# Patient Record
Sex: MALE | Race: ASIAN | Employment: FULL TIME | URBAN - METROPOLITAN AREA
[De-identification: names, ages, dates, MRNs, and addresses within clinical notes are randomized per-mention and may not be internally consistent; named-entity substitution may affect disease eponyms.]

---

## 2021-08-22 ENCOUNTER — APPOINTMENT (OUTPATIENT)
Dept: CT IMAGING | Age: 36
DRG: 064 | End: 2021-08-22
Payer: COMMERCIAL

## 2021-08-22 ENCOUNTER — APPOINTMENT (OUTPATIENT)
Dept: GENERAL RADIOLOGY | Age: 36
DRG: 064 | End: 2021-08-22
Payer: COMMERCIAL

## 2021-08-22 ENCOUNTER — HOSPITAL ENCOUNTER (INPATIENT)
Age: 36
LOS: 4 days | Discharge: HOME | DRG: 064 | End: 2021-08-27
Attending: EMERGENCY MEDICINE | Admitting: INTERNAL MEDICINE
Payer: COMMERCIAL

## 2021-08-22 DIAGNOSIS — R93.0 ABNORMAL HEAD CT: ICD-10-CM

## 2021-08-22 DIAGNOSIS — G45.9 TIA (TRANSIENT ISCHEMIC ATTACK): ICD-10-CM

## 2021-08-22 DIAGNOSIS — I63.429 CEREBROVASCULAR ACCIDENT (CVA) DUE TO EMBOLISM OF ANTERIOR CEREBRAL ARTERY, UNSPECIFIED BLOOD VESSEL LATERALITY (HCC): Primary | ICD-10-CM

## 2021-08-22 DIAGNOSIS — I21.4 NSTEMI (NON-ST ELEVATED MYOCARDIAL INFARCTION) (HCC): ICD-10-CM

## 2021-08-22 PROBLEM — R47.01 APHASIA: Status: ACTIVE | Noted: 2021-08-22

## 2021-08-22 PROBLEM — I63.9 CEREBROVASCULAR ACCIDENT (CVA) DUE TO EMBOLISM (HCC): Status: ACTIVE | Noted: 2021-08-22

## 2021-08-22 LAB
ALBUMIN SERPL-MCNC: 4.5 GM/DL (ref 3.4–5)
ALP BLD-CCNC: 95 IU/L (ref 40–129)
ALT SERPL-CCNC: 30 U/L (ref 10–40)
ANION GAP SERPL CALCULATED.3IONS-SCNC: 10 MMOL/L (ref 4–16)
APTT: 33.7 SECONDS (ref 25.1–37.1)
AST SERPL-CCNC: 30 IU/L (ref 15–37)
BASOPHILS ABSOLUTE: 0.1 K/CU MM
BASOPHILS RELATIVE PERCENT: 0.6 % (ref 0–1)
BILIRUB SERPL-MCNC: 0.4 MG/DL (ref 0–1)
BUN BLDV-MCNC: 12 MG/DL (ref 6–23)
CALCIUM SERPL-MCNC: 9 MG/DL (ref 8.3–10.6)
CHLORIDE BLD-SCNC: 99 MMOL/L (ref 99–110)
CHOLESTEROL: 149 MG/DL
CO2: 25 MMOL/L (ref 21–32)
CREAT SERPL-MCNC: 1.3 MG/DL (ref 0.9–1.3)
DIFFERENTIAL TYPE: ABNORMAL
EOSINOPHILS ABSOLUTE: 0.2 K/CU MM
EOSINOPHILS RELATIVE PERCENT: 1.5 % (ref 0–3)
GFR AFRICAN AMERICAN: >60 ML/MIN/1.73M2
GFR NON-AFRICAN AMERICAN: >60 ML/MIN/1.73M2
GLUCOSE BLD-MCNC: 102 MG/DL (ref 70–99)
GLUCOSE BLD-MCNC: 103 MG/DL (ref 70–99)
HCT VFR BLD CALC: 50.7 % (ref 42–52)
HCT VFR BLD CALC: 52.6 % (ref 42–52)
HDLC SERPL-MCNC: 28 MG/DL
HEMOGLOBIN: 15.9 GM/DL (ref 13.5–18)
HEMOGLOBIN: 16.2 GM/DL (ref 13.5–18)
IMMATURE NEUTROPHIL %: 0.9 % (ref 0–0.43)
INR BLD: 1 INDEX
LDL CHOLESTEROL DIRECT: 94 MG/DL
LYMPHOCYTES ABSOLUTE: 1.8 K/CU MM
LYMPHOCYTES RELATIVE PERCENT: 16.3 % (ref 24–44)
MCH RBC QN AUTO: 26 PG (ref 27–31)
MCH RBC QN AUTO: 26.5 PG (ref 27–31)
MCHC RBC AUTO-ENTMCNC: 30.8 % (ref 32–36)
MCHC RBC AUTO-ENTMCNC: 31.4 % (ref 32–36)
MCV RBC AUTO: 84.4 FL (ref 78–100)
MCV RBC AUTO: 84.6 FL (ref 78–100)
MONOCYTES ABSOLUTE: 0.8 K/CU MM
MONOCYTES RELATIVE PERCENT: 7.5 % (ref 0–4)
NUCLEATED RBC %: 0 %
PDW BLD-RTO: 14.1 % (ref 11.7–14.9)
PDW BLD-RTO: 14.3 % (ref 11.7–14.9)
PLATELET # BLD: 258 K/CU MM (ref 140–440)
PLATELET # BLD: 271 K/CU MM (ref 140–440)
PMV BLD AUTO: 10.3 FL (ref 7.5–11.1)
PMV BLD AUTO: 10.3 FL (ref 7.5–11.1)
POTASSIUM SERPL-SCNC: 4.2 MMOL/L (ref 3.5–5.1)
PROTHROMBIN TIME: 12.9 SECONDS (ref 11.7–14.5)
RBC # BLD: 6.01 M/CU MM (ref 4.6–6.2)
RBC # BLD: 6.22 M/CU MM (ref 4.6–6.2)
SARS-COV-2, NAAT: NOT DETECTED
SEGMENTED NEUTROPHILS ABSOLUTE COUNT: 8.1 K/CU MM
SEGMENTED NEUTROPHILS RELATIVE PERCENT: 73.2 % (ref 36–66)
SODIUM BLD-SCNC: 134 MMOL/L (ref 135–145)
SOURCE: NORMAL
TOTAL IMMATURE NEUTOROPHIL: 0.1 K/CU MM
TOTAL NUCLEATED RBC: 0 K/CU MM
TOTAL PROTEIN: 7 GM/DL (ref 6.4–8.2)
TRIGL SERPL-MCNC: 148 MG/DL
TROPONIN T: 2.3 NG/ML
TROPONIN T: 2.32 NG/ML
WBC # BLD: 11 K/CU MM (ref 4–10.5)
WBC # BLD: 11.1 K/CU MM (ref 4–10.5)

## 2021-08-22 PROCEDURE — 99285 EMERGENCY DEPT VISIT HI MDM: CPT

## 2021-08-22 PROCEDURE — 80053 COMPREHEN METABOLIC PANEL: CPT

## 2021-08-22 PROCEDURE — 85025 COMPLETE CBC W/AUTO DIFF WBC: CPT

## 2021-08-22 PROCEDURE — 80061 LIPID PANEL: CPT

## 2021-08-22 PROCEDURE — 87635 SARS-COV-2 COVID-19 AMP PRB: CPT

## 2021-08-22 PROCEDURE — 6370000000 HC RX 637 (ALT 250 FOR IP): Performed by: EMERGENCY MEDICINE

## 2021-08-22 PROCEDURE — 84484 ASSAY OF TROPONIN QUANT: CPT

## 2021-08-22 PROCEDURE — 93005 ELECTROCARDIOGRAM TRACING: CPT | Performed by: PHYSICIAN ASSISTANT

## 2021-08-22 PROCEDURE — 99254 IP/OBS CNSLTJ NEW/EST MOD 60: CPT | Performed by: INTERNAL MEDICINE

## 2021-08-22 PROCEDURE — 96374 THER/PROPH/DIAG INJ IV PUSH: CPT

## 2021-08-22 PROCEDURE — 83721 ASSAY OF BLOOD LIPOPROTEIN: CPT

## 2021-08-22 PROCEDURE — 6360000004 HC RX CONTRAST MEDICATION: Performed by: PHYSICIAN ASSISTANT

## 2021-08-22 PROCEDURE — 71045 X-RAY EXAM CHEST 1 VIEW: CPT

## 2021-08-22 PROCEDURE — 85730 THROMBOPLASTIN TIME PARTIAL: CPT

## 2021-08-22 PROCEDURE — 82962 GLUCOSE BLOOD TEST: CPT

## 2021-08-22 PROCEDURE — 6360000002 HC RX W HCPCS: Performed by: INTERNAL MEDICINE

## 2021-08-22 PROCEDURE — 85027 COMPLETE CBC AUTOMATED: CPT

## 2021-08-22 PROCEDURE — 70498 CT ANGIOGRAPHY NECK: CPT

## 2021-08-22 PROCEDURE — 70496 CT ANGIOGRAPHY HEAD: CPT

## 2021-08-22 PROCEDURE — 85610 PROTHROMBIN TIME: CPT

## 2021-08-22 PROCEDURE — 93005 ELECTROCARDIOGRAM TRACING: CPT | Performed by: EMERGENCY MEDICINE

## 2021-08-22 PROCEDURE — 70450 CT HEAD/BRAIN W/O DYE: CPT

## 2021-08-22 PROCEDURE — 96376 TX/PRO/DX INJ SAME DRUG ADON: CPT

## 2021-08-22 RX ORDER — ASPIRIN 81 MG/1
324 TABLET, CHEWABLE ORAL ONCE
Status: COMPLETED | OUTPATIENT
Start: 2021-08-22 | End: 2021-08-22

## 2021-08-22 RX ORDER — HEPARIN SODIUM 10000 [USP'U]/100ML
5-30 INJECTION, SOLUTION INTRAVENOUS CONTINUOUS
Status: DISCONTINUED | OUTPATIENT
Start: 2021-08-22 | End: 2021-08-24

## 2021-08-22 RX ORDER — HEPARIN SODIUM 1000 [USP'U]/ML
4000 INJECTION, SOLUTION INTRAVENOUS; SUBCUTANEOUS ONCE
Status: COMPLETED | OUTPATIENT
Start: 2021-08-22 | End: 2021-08-22

## 2021-08-22 RX ORDER — VALACYCLOVIR HYDROCHLORIDE 500 MG/1
500 TABLET, FILM COATED ORAL NIGHTLY
Status: DISCONTINUED | OUTPATIENT
Start: 2021-08-22 | End: 2021-08-27 | Stop reason: HOSPADM

## 2021-08-22 RX ORDER — HEPARIN SODIUM 1000 [USP'U]/ML
60 INJECTION, SOLUTION INTRAVENOUS; SUBCUTANEOUS PRN
Status: DISCONTINUED | OUTPATIENT
Start: 2021-08-22 | End: 2021-08-24

## 2021-08-22 RX ORDER — HEPARIN SODIUM 1000 [USP'U]/ML
30 INJECTION, SOLUTION INTRAVENOUS; SUBCUTANEOUS PRN
Status: DISCONTINUED | OUTPATIENT
Start: 2021-08-22 | End: 2021-08-24

## 2021-08-22 RX ADMIN — HEPARIN SODIUM AND DEXTROSE 11.7 UNITS/KG/HR: 10000; 5 INJECTION INTRAVENOUS at 21:45

## 2021-08-22 RX ADMIN — IOPAMIDOL 80 ML: 755 INJECTION, SOLUTION INTRAVENOUS at 17:41

## 2021-08-22 RX ADMIN — ASPIRIN 324 MG: 81 TABLET, CHEWABLE ORAL at 17:56

## 2021-08-22 RX ADMIN — HEPARIN SODIUM 4000 UNITS: 1000 INJECTION INTRAVENOUS; SUBCUTANEOUS at 21:51

## 2021-08-22 NOTE — ED PROVIDER NOTES
HISTORY OF PRESENT ILLNESS  Kennedy Mary is a 30 y.o. male history of tobacco abuse that presents with a chief complaint of left jaw/throat numbness, difficulty swallowing, expressive aphasia. Pt stated it started at 10 am today. He state he had difficulty finding the appropriate words and making his mouth make the words. He also stated that two days ago he had chest pressure he took an acid reducing medication for with improvement and another episode earlier today. He states he thought he was developing a cold. PHYSICAL EXAM  BP (!) 146/94   Pulse 110   Temp 99.1 °F (37.3 °C) (Oral)   Resp 16   Ht 5' 10\" (1.778 m)   Wt 189 lb (85.7 kg)   SpO2 98%   BMI 27.12 kg/m²     On exam, the patient appears in no acute distress. Speech is clear. Breathing is unlabored. Heart tachycardic and regular. Cranial nerves II through XII intact. Motor strength 5/5 in upper and lower extremities. Patient able to finger-to-nose and heel-to-shin. NIH 0. Stroke alert was called by my HERMAN. CT head demonstrated no acute signs of stroke but chronic calcifications. Patient was assessed by stroke neurologist Dr. Skip Lutz. CTA head/neck is pending. As long as that doesn't show acute abnormalities, he recommended admission for MRI and chest pain work-up. EKG: Sinus tachycardia with flipped T's in the lateral leads. Ventricular rate 103 bpm.  Abnormal EKG    1854: Consult place to interventional. Repeat EKG normal sinus rhythm with inverted T waves in lead lateral leads with new inverted T waves in V2, V3.  1857: Discussed patient with Dr. Brittany Alcantara. He'll see the patient. Patient was evaluated by Dr. Brittany Alcantara. He placed patient on heparin drip and ordered MRI. He states he'll take patient to cath lab tomorrow. Concerned for NSTEMI and possible ACS. Third EKG Normal sinus rhythm with no significant changes from the previous EKG. Total critical care time provided today was 15 minutes.  This excludes seperately billable procedures and family discussion time. Critical care time provided for obtaining history, conducting a physical exam, performing and monitoring interventions, ordering, collecting and interpreting tests, and establishing medical decision-making. There was a potential for life/limb threatening pathology requiring close evaluation and intervention with concern for patient decompensation. All diagnostic, treatment, and disposition decisions were made by myself in conjunction with the advanced practice provider. For all further details of the patient's emergency department visit, please see the advanced practice provider's documentation. Comment: Please note this report has been produced using speech recognition software and may contain errors related to that system including errors in grammar, punctuation, and spelling, as well as words and phrases that may be inappropriate. If there are any questions or concerns please feel free to contact the dictating provider for clarification.         Cordelia Baldwin DO  08/22/21 2119

## 2021-08-22 NOTE — LETTER
Laurie Alvares was seen and treated in our hospital from  8/22/2021 to 8/27/2021 . He will be off work until after his office Visit with Cardiology on 8/31/2021. If you have any questions or concerns, please don't hesitate to call.          Electronically signed by RICARDO Garcia CNP on 8/27/2021 at 10:29 AM no

## 2021-08-22 NOTE — CONSULTS
CARDIOLOGY CONSULT NOTE   Reason for consultation:  NSTEMI         Dear  Dr. Coolidge Nageotte  Thanks for the consult. Chief Complaints :  Chief Complaint   Patient presents with    Numbness     Jaw since 10 am        History of present illness:Shane is a 30 y. o.year old who presents with complaining of inability to express himself jaw tightness lasting few hours today he was having difficulty expressing himself and could not come out with words initially stroke call was called but CT head did not show any acute stroke although was concerning for possible chronic changes and cysticercosis. He says 2 days back he had severe chest pain pressure in the middle of the chest he thought it was heartburn took some honey and water which helped and symptoms resolved he is currently not having any chest pain. He reports jaw tightness or difficulty articulating words. Previous history includes retinal detachment is not on any medication no family history of early coronary artery disease does have history of tobacco use does not drink any alcohol denies any drug use. EKG shows T wave inversion across lateral leads troponin is positive for 2.3 concerning for NSTEMI    Past medical history:    has no past medical history on file. Past surgical history:   has no past surgical history on file. Social History:   reports that he has been smoking. He started smoking today. He has been smoking about 0.50 packs per day. He does not have any smokeless tobacco history on file. He reports that he does not use drugs.   Family history:   no family history of CAD, STROKE of DM at early age    No Known Allergies    heparin (porcine) injection 4,000 Units, Once  heparin (porcine) injection 5,140 Units, PRN  heparin (porcine) injection 2,570 Units, PRN  heparin 25,000 units in dextrose 5% 250 mL (premix) infusion, Continuous      Current Facility-Administered Medications   Medication Dose Route Frequency Provider Last Rate Last Admin    heparin (porcine) injection 4,000 Units  4,000 Units Intravenous Once Karan Michael MD        heparin (porcine) injection 5,140 Units  60 Units/kg Intravenous PRN Karan Michael MD        heparin (porcine) injection 2,570 Units  30 Units/kg Intravenous PRN Karan Michael MD        heparin 25,000 units in dextrose 5% 250 mL (premix) infusion  5-30 Units/kg/hr Intravenous Continuous Karan Michael MD         No current outpatient medications on file. Review of Systems:   · Constitutional: No Fever or Weight Loss   · Eyes: No Decreased Vision  · ENT: No Headaches, Hearing Loss or Vertigo  · Cardiovascular: As per HPI  · Respiratory: As per HPI  · Gastrointestinal: No abdominal pain, appetite loss, blood in stools, constipation, diarrhea or heartburn  · Genitourinary: No dysuria, trouble voiding, or hematuria  · Musculoskeletal:  No gait disturbance, weakness or joint complaints  · Integumentary: No rash or pruritis  · Neurological: No TIA or stroke symptoms  · Psychiatric: No anxiety or depression  · Endocrine: No malaise, fatigue or temperature intolerance  · Hematologic/Lymphatic: No bleeding problems, blood clots or swollen lymph nodes  · Allergic/Immunologic: No nasal congestion or hives  All systems negative except as marked. Physical Examination:    Vitals:    08/22/21 1700 08/22/21 1730 08/22/21 1800 08/22/21 1830   BP: (!) 133/90 129/86 122/88 123/84   Pulse: 108 108 88 85   Resp: 17 17 18 25   Temp:       TempSrc:       SpO2: 99% 100% 100% 100%   Weight:       Height:           General Appearance:  No distress, conversant    Constitutional:  Well developed, Well nourished, No acute distress, Non-toxic appearance.    HENT:  Normocephalic, Atraumatic, Bilateral external ears normal, Oropharynx moist, No oral exudates, Nose normal. Neck- Normal range of motion, No tenderness, Supple, No stridor,no apical-carotid delay  Lymphatics : no palpable lymph nodes  Eyes:  PERRL, EOMI, Conjunctiva normal, No discharge. Respiratory:  Normal breath sounds, No respiratory distress, No wheezing, No chest tenderness. ,no use of accessory muscles, crackles Absent   Cardiovascular: (PMI) apex non displaced,no lifts no thrills, ankle swelling Absent  , 1+, s1 and s2 audible,Murmur. Absent , JVD not noted    Abdomen /GI:  Bowel sounds normal, Soft, No tenderness, No masses, No gross visceromegaly   :  No costovertebral angle tenderness   Musculoskeletal:  No edema, no tenderness, no deformities. Back- no tenderness  Integument:  Well hydrated, no rash   Lymphatic:  No lymphadenopathy noted   Neurologic:  Alert & oriented x 3, CN 2-12 normal, normal motor function, normal sensory function, no focal deficits noted           Medical decision making and Data review:    Lab Review   Recent Labs     08/22/21 1756   WBC 11.1*   HGB 15.9   HCT 50.7         Recent Labs     08/22/21 1756   *   K 4.2   CL 99   CO2 25   BUN 12   CREATININE 1.3     Recent Labs     08/22/21 1756   AST 30   ALT 30   BILITOT 0.4   ALKPHOS 95     Recent Labs     08/22/21 1756   TROPONINT 2.300*       No results for input(s): PROBNP in the last 72 hours. Lab Results   Component Value Date    INR 1.00 08/22/2021    PROTIME 12.9 08/22/2021       EKG: (reviewed by myself)    ECHO:(reviewed by myself)    Chest Xray:(reviewed by myself)  CT HEAD WO CONTRAST    Result Date: 8/22/2021  EXAMINATION: CT OF THE HEAD WITHOUT CONTRAST  8/22/2021 5:19 pm TECHNIQUE: CT of the head was performed without the administration of intravenous contrast. Dose modulation, iterative reconstruction, and/or weight based adjustment of the mA/kV was utilized to reduce the radiation dose to as low as reasonably achievable. COMPARISON: None. HISTORY: ORDERING SYSTEM PROVIDED HISTORY: dysphasia TECHNOLOGIST PROVIDED HISTORY: Has a \"code stroke\" or \"stroke alert\" been called? ->Yes Reason for exam:->dysphasia Decision Support Exception - unselect if not a suspected or confirmed emergency medical condition->Emergency Medical Condition (MA) Reason for Exam: dysphasia Acuity: Acute Type of Exam: Initial FINDINGS: BRAIN/VENTRICLES: There is no acute intracranial hemorrhage, mass effect or midline shift. No abnormal extra-axial fluid collection. The gray-white differentiation is maintained without evidence of an acute infarct. There is no evidence of hydrocephalus. There are subtle areas of coarse intraparenchymal calcification within the left superior frontal gyrus subcortical white matter and right inferior frontal gyrus subcortical white matter ORBITS: The lens within the right globe is not visualized and there is hyperdensity filling the vitreous of the right globe. The visualized portion of the orbits demonstrate no acute abnormality. SINUSES: Mild mucosal thickening of the ethmoidal air cells. The visualized paranasal sinuses and mastoid air cells demonstrate no acute abnormality. SOFT TISSUES/SKULL:  No acute abnormality of the visualized skull or soft tissues. No acute intracranial abnormality. No acute territorial infarction, intracranial hemorrhage or mass lesion. There are subtle areas of coarse intraparenchymal calcification within the left superior frontal gyrus subcortical white matter and right inferior frontal gyrus subcortical white matter. Findings are nonspecific but may be suggestive chronic calcified stage is neurocysticercosis in correct clinical setting. The lens within the right globe is not visualized and there is hyperdensity filling the vitreous of the right globe. The finding is likely related to prior surgical intervention for retinal detachment. Clinical correlation is recommended. Critical results were called by Dr. Denisse Perry MD to Dr. Dana An on 8/22/2021 at 17:31. XR CHEST PORTABLE    Result Date: 8/22/2021  EXAMINATION: ONE XRAY VIEW OF THE CHEST 8/22/2021 6:02 pm COMPARISON: None.  HISTORY: ORDERING SYSTEM PROVIDED HISTORY: dysphasia TECHNOLOGIST PROVIDED HISTORY: Reason for exam:->dysphasia Reason for Exam: stroke protocol, dysphasia, numbness Acuity: Acute Type of Exam: Initial FINDINGS: The lungs are without acute focal process. There is no effusion or pneumothorax. The cardiomediastinal silhouette is without acute process. The osseous structures are without acute process. No acute process. CTA NECK W CONTRAST    Result Date: 8/22/2021  EXAMINATION: CTA OF THE NECK; CTA OF THE HEAD WITH CONTRAST 8/22/2021 5:43 pm; 8/22/2021 5:40 pm: TECHNIQUE: CTA of the neck was performed with the administration of intravenous contrast. Multiplanar reformatted images are provided for review. MIP images are provided for review. Stenosis of the internal carotid arteries measured using NASCET criteria. Dose modulation, iterative reconstruction, and/or weight based adjustment of the mA/kV was utilized to reduce the radiation dose to as low as reasonably achievable.; CTA of the head/brain was performed with the administration of intravenous contrast. Multiplanar reformatted images are provided for review. MIP images are provided for review. Dose modulation, iterative reconstruction, and/or weight based adjustment of the mA/kV was utilized to reduce the radiation dose to as low as reasonably achievable. COMPARISON: None. HISTORY: ORDERING SYSTEM PROVIDED HISTORY: dysphasia TECHNOLOGIST PROVIDED HISTORY: Has a \"code stroke\" or \"stroke alert\" been called? ->Yes Reason for exam:->dysphasia Decision Support Exception - unselect if not a suspected or confirmed emergency medical condition->Emergency Medical Condition (MA) Reason for Exam: dysphasia Acuity: Acute Type of Exam: Initial FINDINGS: CTA NECK: AORTIC ARCH/ARCH VESSELS: No dissection or arterial injury. No significant stenosis of the brachiocephalic or subclavian arteries. CAROTID ARTERIES: No dissection, arterial injury, or hemodynamically significant stenosis by NASCET criteria. VERTEBRAL ARTERIES: No dissection, arterial injury, or significant stenosis. SOFT TISSUES: The lung apices are clear. No cervical or superior mediastinal lymphadenopathy. The larynx and pharynx are unremarkable. No acute abnormality of the salivary and thyroid glands. BONES: No acute osseous abnormality. CTA HEAD: ANTERIOR CIRCULATION: No significant stenosis of the intracranial internal carotid, anterior cerebral, or middle cerebral arteries. No aneurysm. Right SHUN A1 segment is hypoplastic. POSTERIOR CIRCULATION: No significant stenosis of the vertebral, basilar, or posterior cerebral arteries. No aneurysm. OTHER: No dural venous sinus thrombosis on this non-dedicated study. BRAIN: No mass effect or midline shift. No extra-axial fluid collection. The gray-white differentiation is maintained. Unremarkable CTA of the head and neck. CTA HEAD W CONTRAST    Result Date: 8/22/2021  EXAMINATION: CTA OF THE NECK; CTA OF THE HEAD WITH CONTRAST 8/22/2021 5:43 pm; 8/22/2021 5:40 pm: TECHNIQUE: CTA of the neck was performed with the administration of intravenous contrast. Multiplanar reformatted images are provided for review. MIP images are provided for review. Stenosis of the internal carotid arteries measured using NASCET criteria. Dose modulation, iterative reconstruction, and/or weight based adjustment of the mA/kV was utilized to reduce the radiation dose to as low as reasonably achievable.; CTA of the head/brain was performed with the administration of intravenous contrast. Multiplanar reformatted images are provided for review. MIP images are provided for review. Dose modulation, iterative reconstruction, and/or weight based adjustment of the mA/kV was utilized to reduce the radiation dose to as low as reasonably achievable. COMPARISON: None. HISTORY: ORDERING SYSTEM PROVIDED HISTORY: dysphasia TECHNOLOGIST PROVIDED HISTORY: Has a \"code stroke\" or \"stroke alert\" been called? ->Yes Reason for exam:->dysphasia Decision Support Exception - unselect if not a suspected or confirmed emergency medical condition->Emergency Medical Condition (MA) Reason for Exam: dysphasia Acuity: Acute Type of Exam: Initial FINDINGS: CTA NECK: AORTIC ARCH/ARCH VESSELS: No dissection or arterial injury. No significant stenosis of the brachiocephalic or subclavian arteries. CAROTID ARTERIES: No dissection, arterial injury, or hemodynamically significant stenosis by NASCET criteria. VERTEBRAL ARTERIES: No dissection, arterial injury, or significant stenosis. SOFT TISSUES: The lung apices are clear. No cervical or superior mediastinal lymphadenopathy. The larynx and pharynx are unremarkable. No acute abnormality of the salivary and thyroid glands. BONES: No acute osseous abnormality. CTA HEAD: ANTERIOR CIRCULATION: No significant stenosis of the intracranial internal carotid, anterior cerebral, or middle cerebral arteries. No aneurysm. Right SHUN A1 segment is hypoplastic. POSTERIOR CIRCULATION: No significant stenosis of the vertebral, basilar, or posterior cerebral arteries. No aneurysm. OTHER: No dural venous sinus thrombosis on this non-dedicated study. BRAIN: No mass effect or midline shift. No extra-axial fluid collection. The gray-white differentiation is maintained. Unremarkable CTA of the head and neck. All labs, medications and tests reviewed by myself including data  from outside source , patient and available family . Continue all other medications of all above medical condition listed as is. Impression:  Active Problems:    NSTEMI (non-ST elevated myocardial infarction) [facility])    Cerebrovascular accident (CVA) due to embolism [facility])    Aphasia  Resolved Problems:    * No resolved hospital problems. *      Assessment: 30 y. o.year old with PMH of  has no past medical history on file.       Plan and Recommendations:    NSTEMI: Start heparin use nitro if he has any chest pain will plan cardiac catheterization keep n.p.o. after midnight start aspirin. CVA/TIA: Unable to assess whether he had expressive aphasia or had difficulty articulating words because of jaw pain he says he did not have pain? Multiple embolic events get echo get MRI brain CT head and CTA neck reviewed no evidence of acute thrombus  Smoking cessation  Check lipid panel  6. Dyslipidemia: Start statins          Thank you  much for consult and giving us the opportunity in contributing in the care of this patient. Please feel free to call me for any questions.        Lewis Blake MD, 8/22/2021 7:35 PM

## 2021-08-23 ENCOUNTER — APPOINTMENT (OUTPATIENT)
Dept: MRI IMAGING | Age: 36
DRG: 064 | End: 2021-08-23
Payer: COMMERCIAL

## 2021-08-23 LAB
ACTIVATED CLOTTING TIME, LOW RANGE: 167 SEC
ACTIVATED CLOTTING TIME, LOW RANGE: 240 SEC
APTT: 34.1 SECONDS (ref 25.1–37.1)
APTT: 39.1 SECONDS (ref 25.1–37.1)
CHP ED QC CHECK: YES
EKG ATRIAL RATE: 103 BPM
EKG ATRIAL RATE: 74 BPM
EKG ATRIAL RATE: 79 BPM
EKG ATRIAL RATE: 96 BPM
EKG DIAGNOSIS: NORMAL
EKG P AXIS: 44 DEGREES
EKG P AXIS: 49 DEGREES
EKG P AXIS: 52 DEGREES
EKG P AXIS: 71 DEGREES
EKG P-R INTERVAL: 142 MS
EKG P-R INTERVAL: 146 MS
EKG P-R INTERVAL: 150 MS
EKG P-R INTERVAL: 152 MS
EKG Q-T INTERVAL: 320 MS
EKG Q-T INTERVAL: 348 MS
EKG Q-T INTERVAL: 368 MS
EKG Q-T INTERVAL: 394 MS
EKG QRS DURATION: 82 MS
EKG QRS DURATION: 84 MS
EKG QRS DURATION: 84 MS
EKG QRS DURATION: 86 MS
EKG QTC CALCULATION (BAZETT): 419 MS
EKG QTC CALCULATION (BAZETT): 421 MS
EKG QTC CALCULATION (BAZETT): 437 MS
EKG QTC CALCULATION (BAZETT): 439 MS
EKG R AXIS: 20 DEGREES
EKG R AXIS: 21 DEGREES
EKG R AXIS: 27 DEGREES
EKG R AXIS: 47 DEGREES
EKG T AXIS: 144 DEGREES
EKG T AXIS: 156 DEGREES
EKG T AXIS: 158 DEGREES
EKG T AXIS: 161 DEGREES
EKG VENTRICULAR RATE: 103 BPM
EKG VENTRICULAR RATE: 74 BPM
EKG VENTRICULAR RATE: 79 BPM
EKG VENTRICULAR RATE: 96 BPM
GLUCOSE BLD-MCNC: 83 MG/DL
GLUCOSE BLD-MCNC: 83 MG/DL (ref 70–99)
LV EF: 20 %
LVEF MODALITY: NORMAL

## 2021-08-23 PROCEDURE — C1887 CATHETER, GUIDING: HCPCS

## 2021-08-23 PROCEDURE — 4A023N7 MEASUREMENT OF CARDIAC SAMPLING AND PRESSURE, LEFT HEART, PERCUTANEOUS APPROACH: ICD-10-PCS | Performed by: INTERNAL MEDICINE

## 2021-08-23 PROCEDURE — C1769 GUIDE WIRE: HCPCS

## 2021-08-23 PROCEDURE — 6360000004 HC RX CONTRAST MEDICATION

## 2021-08-23 PROCEDURE — 2709999900 HC NON-CHARGEABLE SUPPLY

## 2021-08-23 PROCEDURE — 2500000003 HC RX 250 WO HCPCS

## 2021-08-23 PROCEDURE — 2580000003 HC RX 258

## 2021-08-23 PROCEDURE — 2000000000 HC ICU R&B

## 2021-08-23 PROCEDURE — 93308 TTE F-UP OR LMTD: CPT

## 2021-08-23 PROCEDURE — 85347 COAGULATION TIME ACTIVATED: CPT

## 2021-08-23 PROCEDURE — C1894 INTRO/SHEATH, NON-LASER: HCPCS

## 2021-08-23 PROCEDURE — 85730 THROMBOPLASTIN TIME PARTIAL: CPT

## 2021-08-23 PROCEDURE — 6370000000 HC RX 637 (ALT 250 FOR IP): Performed by: INTERNAL MEDICINE

## 2021-08-23 PROCEDURE — 82962 GLUCOSE BLOOD TEST: CPT

## 2021-08-23 PROCEDURE — 93458 L HRT ARTERY/VENTRICLE ANGIO: CPT | Performed by: INTERNAL MEDICINE

## 2021-08-23 PROCEDURE — 93005 ELECTROCARDIOGRAM TRACING: CPT | Performed by: INTERNAL MEDICINE

## 2021-08-23 PROCEDURE — 93306 TTE W/DOPPLER COMPLETE: CPT

## 2021-08-23 PROCEDURE — 6360000002 HC RX W HCPCS

## 2021-08-23 PROCEDURE — 85610 PROTHROMBIN TIME: CPT

## 2021-08-23 PROCEDURE — 70551 MRI BRAIN STEM W/O DYE: CPT

## 2021-08-23 PROCEDURE — 99233 SBSQ HOSP IP/OBS HIGH 50: CPT | Performed by: INTERNAL MEDICINE

## 2021-08-23 PROCEDURE — 93458 L HRT ARTERY/VENTRICLE ANGIO: CPT

## 2021-08-23 PROCEDURE — 36415 COLL VENOUS BLD VENIPUNCTURE: CPT

## 2021-08-23 PROCEDURE — 93010 ELECTROCARDIOGRAM REPORT: CPT | Performed by: INTERNAL MEDICINE

## 2021-08-23 PROCEDURE — 6360000002 HC RX W HCPCS: Performed by: INTERNAL MEDICINE

## 2021-08-23 RX ORDER — POLYETHYLENE GLYCOL 3350 17 G/17G
17 POWDER, FOR SOLUTION ORAL DAILY PRN
Status: DISCONTINUED | OUTPATIENT
Start: 2021-08-23 | End: 2021-08-27 | Stop reason: HOSPADM

## 2021-08-23 RX ORDER — WARFARIN SODIUM 2.5 MG/1
2.5 TABLET ORAL DAILY
Status: DISCONTINUED | OUTPATIENT
Start: 2021-08-23 | End: 2021-08-24

## 2021-08-23 RX ORDER — ONDANSETRON 2 MG/ML
4 INJECTION INTRAMUSCULAR; INTRAVENOUS EVERY 6 HOURS PRN
Status: DISCONTINUED | OUTPATIENT
Start: 2021-08-23 | End: 2021-08-27 | Stop reason: HOSPADM

## 2021-08-23 RX ORDER — ATORVASTATIN CALCIUM 20 MG/1
80 TABLET, FILM COATED ORAL NIGHTLY
Status: DISCONTINUED | OUTPATIENT
Start: 2021-08-23 | End: 2021-08-27 | Stop reason: HOSPADM

## 2021-08-23 RX ORDER — ASPIRIN 81 MG/1
81 TABLET, CHEWABLE ORAL DAILY
Status: DISCONTINUED | OUTPATIENT
Start: 2021-08-23 | End: 2021-08-27 | Stop reason: HOSPADM

## 2021-08-23 RX ORDER — WARFARIN SODIUM 5 MG/1
5 TABLET ORAL DAILY
Status: DISCONTINUED | OUTPATIENT
Start: 2021-08-23 | End: 2021-08-23

## 2021-08-23 RX ORDER — ACETAMINOPHEN 650 MG/1
650 SUPPOSITORY RECTAL EVERY 6 HOURS PRN
Status: DISCONTINUED | OUTPATIENT
Start: 2021-08-23 | End: 2021-08-27 | Stop reason: HOSPADM

## 2021-08-23 RX ORDER — SODIUM CHLORIDE 0.9 % (FLUSH) 0.9 %
5-40 SYRINGE (ML) INJECTION PRN
Status: DISCONTINUED | OUTPATIENT
Start: 2021-08-23 | End: 2021-08-27 | Stop reason: HOSPADM

## 2021-08-23 RX ORDER — ATROPINE SULFATE 0.1 MG/ML
INJECTION INTRAVENOUS
Status: DISPENSED
Start: 2021-08-23 | End: 2021-08-24

## 2021-08-23 RX ORDER — SODIUM CHLORIDE 0.9 % (FLUSH) 0.9 %
5-40 SYRINGE (ML) INJECTION EVERY 12 HOURS SCHEDULED
Status: DISCONTINUED | OUTPATIENT
Start: 2021-08-23 | End: 2021-08-27 | Stop reason: HOSPADM

## 2021-08-23 RX ORDER — SODIUM CHLORIDE 9 MG/ML
INJECTION, SOLUTION INTRAVENOUS CONTINUOUS
Status: DISCONTINUED | OUTPATIENT
Start: 2021-08-23 | End: 2021-08-26

## 2021-08-23 RX ORDER — SODIUM CHLORIDE 9 MG/ML
25 INJECTION, SOLUTION INTRAVENOUS PRN
Status: DISCONTINUED | OUTPATIENT
Start: 2021-08-23 | End: 2021-08-27 | Stop reason: HOSPADM

## 2021-08-23 RX ORDER — ONDANSETRON 4 MG/1
4 TABLET, ORALLY DISINTEGRATING ORAL EVERY 8 HOURS PRN
Status: DISCONTINUED | OUTPATIENT
Start: 2021-08-23 | End: 2021-08-27 | Stop reason: HOSPADM

## 2021-08-23 RX ORDER — ACETAMINOPHEN 325 MG/1
650 TABLET ORAL EVERY 6 HOURS PRN
Status: DISCONTINUED | OUTPATIENT
Start: 2021-08-23 | End: 2021-08-27 | Stop reason: HOSPADM

## 2021-08-23 RX ORDER — METOPROLOL SUCCINATE 25 MG/1
25 TABLET, EXTENDED RELEASE ORAL DAILY
Status: DISCONTINUED | OUTPATIENT
Start: 2021-08-23 | End: 2021-08-27 | Stop reason: HOSPADM

## 2021-08-23 RX ORDER — LISINOPRIL 5 MG/1
5 TABLET ORAL DAILY
Status: DISCONTINUED | OUTPATIENT
Start: 2021-08-23 | End: 2021-08-27 | Stop reason: HOSPADM

## 2021-08-23 RX ADMIN — HEPARIN SODIUM 5140 UNITS: 1000 INJECTION INTRAVENOUS; SUBCUTANEOUS at 13:38

## 2021-08-23 RX ADMIN — METOPROLOL SUCCINATE 25 MG: 25 TABLET, EXTENDED RELEASE ORAL at 12:15

## 2021-08-23 RX ADMIN — VALACYCLOVIR HYDROCHLORIDE 500 MG: 500 TABLET, FILM COATED ORAL at 00:00

## 2021-08-23 RX ADMIN — VALACYCLOVIR HYDROCHLORIDE 500 MG: 500 TABLET, FILM COATED ORAL at 21:26

## 2021-08-23 RX ADMIN — ATORVASTATIN CALCIUM 80 MG: 20 TABLET, FILM COATED ORAL at 21:26

## 2021-08-23 RX ADMIN — LISINOPRIL 5 MG: 5 TABLET ORAL at 12:15

## 2021-08-23 NOTE — ED NOTES
Patient returns from MRI     Yari Oconnell RN  08/23/21 1021    Heparin infusion re-established.        Yari Oconnell RN  08/23/21 1028

## 2021-08-23 NOTE — CONSULTS
Savanna Varela MD.  Section of General Neurology - Adult  Consult Note        Reason for Consult:    Requesting Physician:  No referring provider defined for this encounter. Thank you for your kind referral.    CHIEF COMPLAINT:  Speech difficulty chest pain         HISTORY OF PRESENT ILLNESS:              The patient is a 30 y.o. male with a history of  male admitted for TIA workup, with couple of hours of aphasia prior to arrival. Initially occurred at noon for about 5 minutes. Patient states that he had broken speech initially which only lasted a brief period, then later it reoccurred which prompted him to come to the emergency room. The second occurrence happened before he came to the hospital, couple hours before, stated that he was having difficulty speaking, he had good thought process and wanted to say certain things but was not coming out right. He called his friend and stated he needed to come to the emergency room. During my examination patient was back to baseline, had no acute complaints, denies any focal motor deficits. Patient states he has never had strokelike symptoms in the past.  Patient states that he is on valacyclovir for prophylaxis given that he had retinal detachment couple years ago. Other than that he does not take any medications.       Of note patient had chest pain 2 days ago, midsternal, lasted half an hour. Stated that resolved on its own, after drinking some water.     Pt states he's strictly vegetarian, and has not been around pork or pigs. Was notified of his diagnoses of possible history of sexual psychosis, however he states he has never been diagnosed with such.     Pt otherwise has no complaints of SOB, dizziness, N/V/C/D, abdominal pain, dysuria, joint pains, rash/boils, cough or fevers. Pt s CT brain was positive for left frontal calcification. Mri brain was positive for left frontal acute infarct. Cardiac cath showed EF of 20 %.   Pt was started on asa 81 gm q d and coumadin. Pt states he gets around 1 headache a month of pressure type sensation around his head of mild degree and is not really a problem for him. Pt denies any Hx of seizures. Pt denies any exposure to feces of any parasitic infection or pork-states he is a vegetarian although state he is not sure if he has been around anyone with exposure to parasitic infection. Pt states he had right eye retinal detachment and has been on valtrex prophylaxis and treatment for possibile Herpes right eye retinal detachment. Past Medical History:    No past medical history on file. Past Surgical History:    No past surgical history on file.   Current Medications:   Current Facility-Administered Medications: sodium chloride flush 0.9 % injection 5-40 mL, 5-40 mL, Intravenous, 2 times per day  sodium chloride flush 0.9 % injection 5-40 mL, 5-40 mL, Intravenous, PRN  0.9 % sodium chloride infusion, 25 mL, Intravenous, PRN  ondansetron (ZOFRAN-ODT) disintegrating tablet 4 mg, 4 mg, Oral, Q8H PRN **OR** ondansetron (ZOFRAN) injection 4 mg, 4 mg, Intravenous, Q6H PRN  acetaminophen (TYLENOL) tablet 650 mg, 650 mg, Oral, Q6H PRN **OR** acetaminophen (TYLENOL) suppository 650 mg, 650 mg, Rectal, Q6H PRN  polyethylene glycol (GLYCOLAX) packet 17 g, 17 g, Oral, Daily PRN  aspirin chewable tablet 81 mg, 81 mg, Oral, Daily  atorvastatin (LIPITOR) tablet 80 mg, 80 mg, Oral, Nightly  0.9 % sodium chloride infusion, , Intravenous, Continuous  perflutren lipid microspheres (DEFINITY) injection, , ,   metoprolol succinate (TOPROL XL) extended release tablet 25 mg, 25 mg, Oral, Daily  lisinopril (PRINIVIL;ZESTRIL) tablet 5 mg, 5 mg, Oral, Daily  warfarin (COUMADIN) tablet 2.5 mg, 2.5 mg, Oral, Daily  heparin (porcine) injection 5,140 Units, 60 Units/kg, Intravenous, PRN  heparin (porcine) injection 2,570 Units, 30 Units/kg, Intravenous, PRN  heparin 25,000 units in dextrose 5% 250 mL (premix) infusion, 5-30 Units/kg/hr, Intravenous, Continuous  valACYclovir (VALTREX) tablet 500 mg, 500 mg, Oral, Nightly  Allergies:  Patient has no known allergies. Social History:  TOBACCO:   reports that he has been smoking. He started smoking yesterday. He has been smoking about 0.50 packs per day. He does not have any smokeless tobacco history on file. ETOH:   has no history on file for alcohol use. DRUGS:   reports no history of drug use. Family History:   No family history on file. REVIEW OF SYSTEMS:  CONSTITUTIONAL:  negative  HEENT:  negative  RESPIRATORY:  negative  CARDIOVASCULAR:  negative  GASTROINTESTINAL:  negative  GENITOURINARY:  negative  MUSCULOSKELETAL:  negative  BEHAVIOR/PSYCH:  Negative    ROS neg    Family hx neg    PHYSICAL EXAM  ------------------------  Vitals:  BP 94/62   Pulse 86   Temp 98.4 °F (36.9 °C) (Oral)   Resp 17   Ht 5' 10\" (1.778 m)   Wt 189 lb (85.7 kg)   SpO2 98%   BMI 27.12 kg/m²      General:  Awake, alert, oriented X 3. Well developed, well nourished, well groomed. No apparent distress. HEENT:  Normocephalic, atraumatic. Pupils equal, round, reactive to light. No scleral icterus. No conjunctival injection. Normal lips, teeth, and gums. No nasal discharge. Neck:  Supple  Heart:  RRR, no murmurs, gallops, rubs  Lungs:  CTA bilaterally, bilat symmetrical expansion, no wheeze, rales, or rhonchi  Abdomen: Bowel sounds present, soft, nontender, no masses, no organomegaly, no peritoneal signs  Extremities:  No clubbing, cyanosis, or edema  Skin:  Warm and dry, no open lesions or rash  Breast: deferred  Rectal: deferred  Genitalia:  deferred    NEUROLOGICAL EXAM  ---------------------------------    Mental Status Exam:             Alert and oriented times three,follows commands,speech and language intact    Cranial [id number]-PU-LQY Intact.         Cranial nerve II           Visual acuity:  normal                 Cranial nerve III           Pupils:  equal, round, reactive to light      Cranial nerves III, IV, VI           Extraocular Movements: intact      Cranial nerve V           Facial sensation:  intact      Cranial nerve VII           Facial strength: intact      Cranial nerve VIII           Hearing:  intact      Cranial nerve IX           Palate:  intact      Cranial nerve XI         Shoulder shrug:  intact      Cranial nerve XII          Tongue movement:  normal    Motor:    Drift:  absent  Motor exam is symmetrical 5 out of 5 all extremities bilaterally  Tone:  normal  Abnormal Movements:  Absent    DTRs-2+ biceps,triceps,brachioradialis,knee jerks and ankle jerks bilaterally symmetrical.  Toes-downgoing bilaterally            Sensory:normal sensation              CBC with Differential:    Lab Results   Component Value Date    WBC 11.0 08/22/2021    RBC 6.22 08/22/2021    HGB 16.2 08/22/2021    HCT 52.6 08/22/2021     08/22/2021    MCV 84.6 08/22/2021    MCH 26.0 08/22/2021    MCHC 30.8 08/22/2021    RDW 14.3 08/22/2021    SEGSPCT 73.2 08/22/2021    LYMPHOPCT 16.3 08/22/2021    MONOPCT 7.5 08/22/2021    BASOPCT 0.6 08/22/2021    MONOSABS 0.8 08/22/2021    LYMPHSABS 1.8 08/22/2021    EOSABS 0.2 08/22/2021    BASOSABS 0.1 08/22/2021    DIFFTYPE AUTOMATED DIFFERENTIAL 08/22/2021     CMP:    Lab Results   Component Value Date     08/22/2021    K 4.2 08/22/2021    CL 99 08/22/2021    CO2 25 08/22/2021    BUN 12 08/22/2021    CREATININE 1.3 08/22/2021    GFRAA >60 08/22/2021    LABGLOM >60 08/22/2021    GLUCOSE 83 08/23/2021    PROT 7.0 08/22/2021    LABALBU 4.5 08/22/2021    CALCIUM 9.0 08/22/2021    BILITOT 0.4 08/22/2021    ALKPHOS 95 08/22/2021    AST 30 08/22/2021    ALT 30 08/22/2021     BMP:    Lab Results   Component Value Date     08/22/2021    K 4.2 08/22/2021    CL 99 08/22/2021    CO2 25 08/22/2021    BUN 12 08/22/2021    LABALBU 4.5 08/22/2021    CREATININE 1.3 08/22/2021    CALCIUM 9.0 08/22/2021    GFRAA >60 08/22/2021    LABGLOM >60 08/22/2021    GLUCOSE 83 08/23/2021 PT/INR:    Lab Results   Component Value Date    PROTIME 12.9 08/22/2021    INR 1.00 08/22/2021     PTT:    Lab Results   Component Value Date    APTT 34.1 08/23/2021   [APTT  U/A:  No results found for: NITRITE, COLORU, PHUR, LABCAST, WBCUA, RBCUA, MUCUS, TRICHOMONAS, YEAST, BACTERIA, CLARITYU, SPECGRAV, LEUKOCYTESUR, UROBILINOGEN, BILIRUBINUR, BLOODU, GLUCOSEU, AMORPHOUS  TSH:  No results found for: TSH  VITAMIN B12: No components found for: B12  FOLATE:  No results found for: FOLATE  RPR:  No results found for: RPR  KACEY:  No results found for: ANATITER, KACEY  Urine Toxicology:  No components found for: IAMMENTA, IBARBIT, IBENZO, ICOCAINE, IMARTHC, IOPIATES, IPHENCYC     IMPRESSION:    Left Frontal acute infarct r/o cardio carotid embolic event- cardioembolic event from low EF 20 % is a possibility-cardiology is suspicious of an apical Ventricular thrombotic etiology and started pt on asa and coumadin. CTA head neck was neg for carotid stenosis. Left Frontal calcific lesion-cysticercosis or other non-specific inflammatory infectious possibilities were entertained on discussion with Dr. Marissa Doll Neuro-radiologist on the phone after looking at the CT and Mri brain-discussed option of treating with anti-parasitic meds and discussed possibile complications of inducing inflammatory reaction in a calcific lesion that more likely has been there for some time and possibly chronic that is highly likely asymptomatic at [facility] time and patient chose not to be treated with anti-parasitic meds at this time. Will do serial CT brain every 6-12  Months to assess left frontal calcific lesion. discussed option of using seizure med and as pt has not had any seizures discussed with pt that there is a possibility of having seizures and pt again chose not to be treated with seizure med at this time and seems reasonable.     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 discussed dx prognosis meds side effects and above with pt and answered all questions. Landen Cotton MD MD  BOARD CERTIFIED-NEUROLOGY.

## 2021-08-23 NOTE — PROGRESS NOTES
Medication History  [facility]    Patient Name: Hema Davenport 1985     Medication history has been completed by: Kaylee Hansen CPhT    Source(s) of information: patient     Primary Care Physician: No primary care provider on file. Pharmacy: [facility]    Allergies as of 08/22/2021    (No Known Allergies)        Prior to Admission medications    Not on File     Comments:  Patient states he takes no current medications.     To my knowledge the above medication history is accurate as of 8/23/2021 11:45 AM.   Kaylee Hansen CPhT   8/23/2021 11:45 AM

## 2021-08-23 NOTE — PROGRESS NOTES
Pt transferred from cath lab. Has a right radial and right femoral incision sites. TR band on right radial, 6F sheath in right fem. Rt Fem Site is soft,  no bleeding bruising or hematoma present. Pedal pulses palpable in BL feet. VSS no c/o CP SOB pt on RA. Family at bedside.

## 2021-08-23 NOTE — ED NOTES
Care assumed at this time.   Report from Justin Chapa. Pierre Espinosa [address], RN  08/23/21 [id number]

## 2021-08-23 NOTE — ED PROVIDER NOTES
Patient Identification  Kari Palmer is a 30 y.o. male    Chief Complaint  Numbness (Jaw since 10 am)      HPI  (History provided by patient)  This is a 30 y.o. male who was brought in by self for chief complaint of left jaw/throat numbness, difficulty swallowing, expressive aphasia. Onset was 10 AM today. Patient reports he began suddenly feeling like his left cheek and jaw were numb, noticed that he was dribbling food and liquids out of the left side of his mouth, and began have difficulty swallowing. He also noticed during this time that he was having difficulty speaking correctly, states that he could tell what he wanted to say but could not make his mouth say the words. By the time he arrived in ED he reports only difficulty speaking, jaw and throat numbness and difficulty swallowing have resolved. He also notes that a few days ago he had some chest discomfort that he took an acid reducing medication for with improvement. REVIEW OF SYSTEMS    Constitutional:  Denies fever, chills  HENT:  Denies sore throat or ear pain   Eyes: Denies vision changes, eye pain  Cardiovascular:  Denies syncope  Respiratory:  Denies shortness of breath, cough   GI:  Denies abdominal pain, nausea, vomiting  :  Denies dysuria, discharge  Musculoskeletal:  Denies back pain, joint pain  Skin:  Denies rash, pruritis  Neurologic:  Denies headache, focal weakness. + sensory changes     See HPI and nursing notes for additional information     I have reviewed the following nursing documentation:  Allergies: No Known Allergies    Past medical history:  has no past medical history on file. Past surgical history:  has no past surgical history on file. Home medications:   Prior to Admission medications    Not on File       Social history:  reports that he has been smoking. He started smoking today. He has been smoking about 0.50 packs per day. He does not have any smokeless tobacco history on file.  He reports that he does not use drugs. Family history:  No family history on file. Exam  /84   Pulse 85   Temp 99.1 °F (37.3 °C) (Oral)   Resp 25   Ht 5' 10\" (1.778 m)   Wt 189 lb (85.7 kg)   SpO2 100%   BMI 27.12 kg/m²   Nursing note and vitals reviewed. Constitutional: Well developed, well nourished. No acute distress. HENT:      Head: Normocephalic and atraumatic. Ears: External ears normal.      Nose: Nose normal.     Mouth: Membrane mucosa moist and pink. No posterior oropharynx erythema or tonsillar edema  Eyes: Anicteric sclera. No discharge, left pupil is round and reactive to light, right pupil is sluggish, cornea clouded, chronic per patient. Neck: Supple. Trachea midline. Cardiovascular: RRR, no murmurs, rubs, or gallops, radial pulses 2+ bilaterally. Pulmonary/Chest: Effort normal. No respiratory distress. CTAB. No stridor. No wheezes. No rales. Abdominal: Soft. Nontender to palpation. No distension. No guarding, rebound tenderness, or evidence of ascites. : No CVA tenderness. Musculoskeletal: Moves all extremities. No gross deformity. NEUROLOGICAL: Awake and alert. GCS 15. Cranial nerves 2-12 grossly intact excluding right sided vision which is extremely poor chronically per patient. Strength 5/5 throughout. Light touch sensation intact throughout. Finger to nose WNL. Skin: Warm and dry. No rash. Psychiatric: Normal mood and affect. Behavior is normal.      EKG   Please see Dr. Syed Vides note for EKG read. Radiographs (if obtained):  [] The following radiograph was interpreted by myself in the absence of a radiologist:   [x] Radiologist's Report Reviewed:  XR CHEST PORTABLE   Final Result   No acute process. CTA NECK W CONTRAST   Final Result   Unremarkable CTA of the head and neck. CTA HEAD W CONTRAST   Final Result   Unremarkable CTA of the head and neck. CT HEAD WO CONTRAST   Final Result   No acute intracranial abnormality.  No acute territorial infarction,   intracranial hemorrhage or mass lesion. There are subtle areas of coarse intraparenchymal calcification within the   left superior frontal gyrus subcortical white matter and right inferior   frontal gyrus subcortical white matter. Findings are nonspecific but may be   suggestive chronic calcified stage is neurocysticercosis in correct clinical   setting. The lens within the right globe is not visualized and there is hyperdensity   filling the vitreous of the right globe. The finding is likely related to   prior surgical intervention for retinal detachment. Clinical correlation is   recommended. Critical results were called by Dr. Evaristo Garner MD to Dr. Benjamin Jaimes on   8/22/2021 at 17:31.          MRI BRAIN WO CONTRAST    (Results Pending)          Labs  Results for orders placed or performed during the hospital encounter of 08/22/21   COVID-19, Rapid    Specimen: Nasopharyngeal   Result Value Ref Range    Source THROAT     SARS-CoV-2, NAAT NOT DETECTED NOT DETECTED   CBC Auto Differential   Result Value Ref Range    WBC 11.1 (H) 4.0 - 10.5 K/CU MM    RBC 6.01 4.6 - 6.2 M/CU MM    Hemoglobin 15.9 13.5 - 18.0 GM/DL    Hematocrit 50.7 42 - 52 %    MCV 84.4 78 - 100 FL    MCH 26.5 (L) 27 - 31 PG    MCHC 31.4 (L) 32.0 - 36.0 %    RDW 14.1 11.7 - 14.9 %    Platelets 750 123 - 658 K/CU MM    MPV 10.3 7.5 - 11.1 FL    Differential Type AUTOMATED DIFFERENTIAL     Segs Relative 73.2 (H) 36 - 66 %    Lymphocytes % 16.3 (L) 24 - 44 %    Monocytes % 7.5 (H) 0 - 4 %    Eosinophils % 1.5 0 - 3 %    Basophils % 0.6 0 - 1 %    Segs Absolute 8.1 K/CU MM    Lymphocytes Absolute 1.8 K/CU MM    Monocytes Absolute 0.8 K/CU MM    Eosinophils Absolute 0.2 K/CU MM    Basophils Absolute 0.1 K/CU MM    Nucleated RBC % 0.0 %    Total Nucleated RBC 0.0 K/CU MM    Total Immature Neutrophil 0.10 K/CU MM    Immature Neutrophil % 0.9 (H) 0 - 0.43 %   Comprehensive Metabolic Panel w/ Reflex to MG Result Value Ref Range    Sodium 134 (L) 135 - 145 MMOL/L    Potassium 4.2 3.5 - 5.1 MMOL/L    Chloride 99 99 - 110 mMol/L    CO2 25 21 - 32 MMOL/L    BUN 12 6 - 23 MG/DL    CREATININE 1.3 0.9 - 1.3 MG/DL    Glucose 103 (H) 70 - 99 MG/DL    Calcium 9.0 8.3 - 10.6 MG/DL    Albumin 4.5 3.4 - 5.0 GM/DL    Total Protein 7.0 6.4 - 8.2 GM/DL    Total Bilirubin 0.4 0.0 - 1.0 MG/DL    ALT 30 10 - 40 U/L    AST 30 15 - 37 IU/L    Alkaline Phosphatase 95 40 - 129 IU/L    GFR Non-African American >60 >60 mL/min/1.73m2    GFR African American >60 >60 mL/min/1.73m2    Anion Gap 10 4 - 16   Troponin   Result Value Ref Range    Troponin T 2.300 (HH) <0.01 NG/ML   Protime-INR   Result Value Ref Range    Protime 12.9 11.7 - 14.5 SECONDS    INR 1.00 INDEX   CBC   Result Value Ref Range    WBC 11.0 (H) 4.0 - 10.5 K/CU MM    RBC 6.22 (H) 4.6 - 6.2 M/CU MM    Hemoglobin 16.2 13.5 - 18.0 GM/DL    Hematocrit 52.6 (H) 42 - 52 %    MCV 84.6 78 - 100 FL    MCH 26.0 (L) 27 - 31 PG    MCHC 30.8 (L) 32.0 - 36.0 %    RDW 14.3 11.7 - 14.9 %    Platelets 219 697 - 200 K/CU MM    MPV 10.3 7.5 - 11.1 FL   APTT   Result Value Ref Range    aPTT 33.7 25.1 - 37.1 SECONDS   Troponin   Result Value Ref Range    Troponin T 2.320 (HH) <0.01 NG/ML   Lipid panel   Result Value Ref Range    Triglycerides 148 <150 MG/DL    Cholesterol 149 <200 MG/DL    HDL 28 (L) >40 MG/DL    LDL Direct 94 <100 MG/DL   POCT Glucose   Result Value Ref Range    POC Glucose 102 (H) 70 - 99 MG/DL         MDM  Patient presents for strange neurologic symptoms. While speaking with stroke neurologist his difficulty speaking also resolved and he is now asymptomatic. His work-up here reveals CT head with possible chronic calcified neurocysticercosis as well as hyperdensity filling the vitreous of the right globe likely from his previous retinal detachment. There is no acute findings. CTA head and neck are negative.   Laboratory work-up is concerning for significantly elevated troponin. Patient did have abnormal EKG in ED with lateral T wave inversions, these progressed on second EKG. Patient seen in ED by cardiologist Dr. Radha Lawrence who recommended heparin drip and aspirin. Will cath tomorrow. Plan is to admit. Consult placed to Dr. Eladia Gan with hospitalist service who agreed to admit. Total critical care time today provided was 32 minutes. This excludes seperately billable procedure. Critical care time provided for TIA, NSTEMI, heparin drip, telemetry monitoring, cardiology consultation, hospitalist consultation that required close evaluation and/or intervention with concern for patient decompensation. This patient was also seen and evaluated by Dr. Anderson Storm    Final Impression  1. TIA (transient ischemic attack)    2. NSTEMI (non-ST elevated myocardial infarction) ([facility] [address]    3. Abnormal head CT        Blood pressure 123/84, pulse 85, temperature 99.1 °F (37.3 °C), temperature source Oral, resp. rate 25, height 5' 10\" (1.778 m), weight 189 lb (85.7 kg), SpO2 100 %. Disposition:  Admit to telemetry in stable condition. Patient was given scripts for the following medications. I counseled patient how to take these medications. New Prescriptions    No medications on file       This chart was generated using the [address] [place] [address] [employer] system. I created this record but it may contain dictation errors given the limitations of this technology.         Deon Sahu PA-C  08/22/21 0417

## 2021-08-23 NOTE — H&P
Units/kg Intravenous PRN Julisa Delgado MD        heparin 25,000 units in dextrose 5% 250 mL (premix) infusion  5-30 Units/kg/hr Intravenous Continuous Julisa Delgado MD        valACYclovir (VALTREX) tablet 500 mg  500 mg Oral Nightly Frankie Jaime Valdivia MD         No current outpatient medications on file. Allergies  No Known Allergies    REVIEW OF SYSTEMS   Within above limitations. 14 point review of systems reviewed. Pertinent positive or negative as per HPI or otherwise negative per 14 point systems review. PHYSICAL EXAM     Wt Readings from Last 3 Encounters:   08/22/21 189 lb (85.7 kg)       Blood pressure 123/84, pulse 85, temperature 99.1 °F (37.3 °C), temperature source Oral, resp. rate 25, height 5' 10\" (1.778 m), weight 189 lb (85.7 kg), SpO2 100 %. General - AAO x 3  Psych - Appropriate affect/speech. No agitation, no dysarthria, able to speak in full sentences  Eyes - Eye lids intact. No scleral icterus  ENT - Lips wnl. External ear clear/dry/intact. No thyromegaly on inspection  Neuro - No gross peripheral or central neuro deficits on inspection; 5 out of 5 in strength in flexion and extension of bilateral extremities-upper and lower, no facial asymmetry, no dysarthria  Heart - Sinus. RRR. S1 and S2 present. No added HS/murmurs appreciated. No elevated JVD appreciated  Lung - Adequate air entry b/l, No crackles/wheezes appreciated  GI - Soft. No guarding/rigidity. No hepatosplenomegaly/ascites. BS+   - No CVA/suprapubic tenderness or palpable bladder distension  Skin - Intact. No rash/petechiae/ecchymosis. Warm extremities  MSK - Joints with normal ROM.  No joint swellings    Lines/Drains/Airways/Wounds:  [unfilled]    LABS AND IMAGING   CBC  [unfilled]    Last 3 Hemoglobin  Lab Results   Component Value Date    HGB 16.2 08/22/2021    HGB 15.9 08/22/2021     Last 3 WBC/ANC  Lab Results   Component Value Date    WBC 11.0 08/22/2021    WBC 11.1 08/22/2021     No components found for: GRNLOCTYABS  Last 3 Platelets  No results found for: PLATELET  Chemistry  [unfilled]  [unfilled]  No results found for: LDH  Coagulation Studies  Lab Results   Component Value Date    INR 1.00 08/22/2021     Liver Function Studies  Lab Results   Component Value Date    ALT 30 08/22/2021    AST 30 08/22/2021    ALKPHOS 95 08/22/2021       Recent Imaging        Relevant labs and imaging reviewed    ASSESSMENT AND PLAN   Mariah Shrot is a 30 y.o. male p/w    Aphasia resolved possibly d/t  TIA  Admit under observation on telemetry  NPO until swallow evaluation  MRI of the brain carotid Doppler and CTA noted  Neuro consult  Neuro check every 4 hours  Permissive hypertension during the first 24 to 48 hours to keep blood pressure systolic 300-534 mmHg  Continue on aspirin statin  No TPA due to low NIH score   No focal neurological deficit on examination    CT with possible reading of history of neurocysticercosis, given calcification pattern, possible prior history of such  -No active symptoms, denies any seizure history,  -ID consult  -MRI  -Neuro consult    Chest pain, consider ACS, likely NSTEMI, given positive troponins   Admit to Med/Surg    Telemetry monitoring    Serial troponin level and repeat EKG in AM   Cardiology consult. Antiplatelet/BB/Statin  Possible cath, keep n.p.o., IV fluids   Continue heparin drip    History of retinal detachment  Continue valacyclovir    Case d/w ED provider    DVT ppx: Heparin drip  Code status: Full code    [facility], Internal Medicine  8/22/2021 at 9:11 PM     Due to the immediate potential for life-threatening deterioration due to NSTEMI, I spent of 45 minutes providing critical care. This time is excluding time spent performing procedures.

## 2021-08-23 NOTE — PROGRESS NOTES
PHARMACY ANTICOAGULATION MONITORING SERVICE    Leonel Haque is a 30 y.o. male on warfarin therapy for acute CVA. Pharmacy consulted by Dr. Katelin Casas for monitoring and adjustment of treatment. Indication for anticoagulation: s/p acute CVA  INR goal: 2-3  Warfarin dose prior to admission: N/A - new start    Pertinent Laboratory Values Recent Labs     08/22/21  1756 08/22/21  1756 08/22/21 2053   INR 1.00  --   --    HGB 15.9   < > 16.2   HCT 50.7   < > 52.6*     --  271    < > = values in this interval not displayed.        Assessment/Plan:   Possible DDI:   o Aspirin 81 mg daily  o Heparin bridge until INR is therapeutic   INR is pending   Initiate warfarin 2.5 mg daily with further adjustments based on INR trends   Pharmacy will continue to monitor and adjust warfarin therapy as indicated    Thank you for the consult,  Carmen BIRCH [facility], PharmD  8/23/2021 4:43 PM

## 2021-08-23 NOTE — PROGRESS NOTES
Cardiology Progress Note     Admit Date:  8/22/2021    Consult reason/ Seen today for :   NSTEMI  CVA    Subjective and  Overnight Events :  Echo shows reduced EF of 25-30 % . Troponin rising he has no chest pain overnight on heparin drip. MRI confirms acute frontal stroke      Chief complain on admission : 30 y. o.year old who is admitted for  Chief Complaint   Patient presents with    Numbness     Jaw since 10 am      Assessment / Plan:  ASCVD: NSTEMI: Cardiac cath today to define coronary anatomy will need to be on long-term anticoagulation I think he developed LV thrombus due to cardiomyopathy causing an acute stroke we will need to rule out ischemic versus nonischemic cardiomyopathy with cath today plan discussed with patient and Team in detail  Cardiomyopathy: EF of 20% start Toprol-XL lisinopril  Acute CVA: Possible embolic stroke from LV due to severe cardiomyopathy, although Definity did not show thrombus, start Coumadin if okay later today due to small infarct(low risk for hemorrhagic conversion)  Neurocysticercosis get infectious disease evaluation  HTN: stable, continue To titrate up medication as needed  DVT Prophylaxis if no contraindication    Past medical history:    has no past medical history on file. Past surgical history:   has no past surgical history on file. Social History:   reports that he has been smoking. He started smoking yesterday. He has been smoking about 0.50 packs per day. He does not have any smokeless tobacco history on file. He reports that he does not use drugs. Family history:  family history is not on file.     No Known Allergies    Review of Systems:    All 14 systems were reviewed and are negative  Except for the positive findings  which as documented     /84   Pulse 83   Temp 99.1 °F (37.3 °C) (Oral)   Resp 17   Ht 5' 10\" (1.778 m)   Wt 189 lb (85.7 kg)   SpO2 98%   BMI 27.12 kg/m²   No intake or output data in the 24 hours ending 08/23/21 1039  Physical Exam:  Constitutional:  Well developed, Well nourished, No acute distress, Non-toxic appearance. HENT:  Normocephalic, Atraumatic, Bilateral external ears normal, Oropharynx moist, No oral exudates, Nose normal. Neck- Normal range of motion, No tenderness, Supple, No stridor. Eyes:  PERRL, EOMI, Conjunctiva normal, No discharge. Respiratory:  Normal breath sounds, No respiratory distress, No wheezing, No chest tenderness. Cardiovascular:  Normal heart rate, Normal rhythm, No murmurs, No rubs, No gallops, JVP not elevated  Abdomen/GI:  Bowel sounds normal, Soft, No tenderness, No masses, No pulsatile masses. Musculoskeletal:  Intact distal pulses, No edema, No tenderness, No cyanosis, No clubbing. Good range of motion in all major joints. No tenderness to palpation or major deformities noted. Back- No tenderness. Integument:  Warm, Dry, No erythema, No rash. Lymphatic:  No lymphadenopathy noted. Neurologic:  Alert & oriented x 3, Normal motor function, Normal sensory function, No focal deficits noted.    Psychiatric:  Affect  and  Mood :no change    Medications:    sodium chloride flush  5-40 mL Intravenous 2 times per day    aspirin  81 mg Oral Daily    atorvastatin  80 mg Oral Nightly    perflutren lipid microspheres        metoprolol succinate  25 mg Oral Daily    lisinopril  5 mg Oral Daily    valACYclovir  500 mg Oral Nightly      sodium chloride      sodium chloride Stopped (08/23/21 0410)    heparin (PORCINE) Infusion 14.7 Units/kg/hr (08/23/21 4597)     sodium chloride flush, sodium chloride, ondansetron **OR** ondansetron, acetaminophen **OR** acetaminophen, polyethylene glycol, heparin (porcine), heparin (porcine)    Lab Data:  CBC:   Recent Labs     08/22/21 1756 08/22/21 2053   WBC 11.1* 11.0*   HGB 15.9 16.2   HCT 50.7 52.6*   MCV 84.4 84.6    271     BMP:   Recent Labs     08/22/21  1466 *   K 4.2   CL 99   CO2 25   BUN 12   CREATININE 1.3     PT/INR:   Recent Labs     08/22/21 1756   PROTIME 12.9   INR 1.00     BNP:  No results for input(s): PROBNP in the last 72 hours. TROPONIN:   Recent Labs     08/22/21 1756 08/22/21 2053   TROPONINT 2.300* 2.320*        ECHO :   echocardiogram    Assessment:  30 y. o.year old who is admitted for  Chief Complaint   Patient presents with    Numbness     Jaw since 10 am    , active issues as noted below:  Impression:  Active Problems:    NSTEMI (non-ST elevated myocardial infarction) [facility])    Cerebrovascular accident (CVA) due to embolism ([facility] [address]    Aphasia  Resolved Problems:    * No resolved hospital problems. *            All labs, medications and tests reviewed by myself , continue all other medications of all above medical condition listed as is except for changes mentioned above. Thank you very much for consult , please call with questions.     Julisa Delgado MD, MD 8/23/2021 10:39 AM

## 2021-08-24 ENCOUNTER — APPOINTMENT (OUTPATIENT)
Dept: NUCLEAR MEDICINE | Age: 36
DRG: 064 | End: 2021-08-24
Payer: COMMERCIAL

## 2021-08-24 LAB
APTT: 35 SECONDS (ref 25.1–37.1)
APTT: 37.8 SECONDS (ref 25.1–37.1)
FOLATE: 5.5 NG/ML (ref 3.1–17.5)
HCT VFR BLD CALC: 48.6 % (ref 42–52)
HEMOGLOBIN: 15.3 GM/DL (ref 13.5–18)
HOMOCYSTEINE: 19.7 UMOL/L (ref 0–10)
INR BLD: 1.02 INDEX
INR BLD: 1.03 INDEX
MCH RBC QN AUTO: 26.7 PG (ref 27–31)
MCHC RBC AUTO-ENTMCNC: 31.5 % (ref 32–36)
MCV RBC AUTO: 85 FL (ref 78–100)
PDW BLD-RTO: 14.5 % (ref 11.7–14.9)
PLATELET # BLD: 230 K/CU MM (ref 140–440)
PMV BLD AUTO: 10.2 FL (ref 7.5–11.1)
PROTHROMBIN TIME: 13.2 SECONDS (ref 11.7–14.5)
PROTHROMBIN TIME: 13.3 SECONDS (ref 11.7–14.5)
RBC # BLD: 5.72 M/CU MM (ref 4.6–6.2)
TSH HIGH SENSITIVITY: 1.31 UIU/ML (ref 0.27–4.2)
VITAMIN B-12: 150 PG/ML (ref 211–911)
WBC # BLD: 10.1 K/CU MM (ref 4–10.5)

## 2021-08-24 PROCEDURE — A9505 TL201 THALLIUM: HCPCS | Performed by: INTERNAL MEDICINE

## 2021-08-24 PROCEDURE — 6370000000 HC RX 637 (ALT 250 FOR IP): Performed by: INTERNAL MEDICINE

## 2021-08-24 PROCEDURE — 86592 SYPHILIS TEST NON-TREP QUAL: CPT

## 2021-08-24 PROCEDURE — 99233 SBSQ HOSP IP/OBS HIGH 50: CPT | Performed by: INTERNAL MEDICINE

## 2021-08-24 PROCEDURE — 82746 ASSAY OF FOLIC ACID SERUM: CPT

## 2021-08-24 PROCEDURE — 36415 COLL VENOUS BLD VENIPUNCTURE: CPT

## 2021-08-24 PROCEDURE — C1887 CATHETER, GUIDING: HCPCS

## 2021-08-24 PROCEDURE — 6370000000 HC RX 637 (ALT 250 FOR IP): Performed by: PSYCHIATRY & NEUROLOGY

## 2021-08-24 PROCEDURE — 2060000000 HC ICU INTERMEDIATE R&B

## 2021-08-24 PROCEDURE — 83090 ASSAY OF HOMOCYSTEINE: CPT

## 2021-08-24 PROCEDURE — 3430000000 HC RX DIAGNOSTIC RADIOPHARMACEUTICAL: Performed by: INTERNAL MEDICINE

## 2021-08-24 PROCEDURE — 99255 IP/OBS CONSLTJ NEW/EST HI 80: CPT | Performed by: INTERNAL MEDICINE

## 2021-08-24 PROCEDURE — 84443 ASSAY THYROID STIM HORMONE: CPT

## 2021-08-24 PROCEDURE — 2580000003 HC RX 258: Performed by: INTERNAL MEDICINE

## 2021-08-24 PROCEDURE — 85027 COMPLETE CBC AUTOMATED: CPT

## 2021-08-24 PROCEDURE — 94761 N-INVAS EAR/PLS OXIMETRY MLT: CPT

## 2021-08-24 PROCEDURE — 82607 VITAMIN B-12: CPT

## 2021-08-24 PROCEDURE — 2000000000 HC ICU R&B

## 2021-08-24 PROCEDURE — 85730 THROMBOPLASTIN TIME PARTIAL: CPT

## 2021-08-24 PROCEDURE — 78469 HEART INFARCT IMAGE (3D): CPT

## 2021-08-24 PROCEDURE — 85610 PROTHROMBIN TIME: CPT

## 2021-08-24 RX ORDER — B12/LEVOMEFOLATE CALCIUM/B-6 2-1.13-25
1 TABLET ORAL DAILY
Status: DISCONTINUED | OUTPATIENT
Start: 2021-08-24 | End: 2021-08-27 | Stop reason: HOSPADM

## 2021-08-24 RX ORDER — THALLOUS CHLORIDE TL-201 1 MCI/ML
1 INJECTION, POWDER, LYOPHILIZED, FOR SOLUTION INTRAVENOUS
Status: COMPLETED | OUTPATIENT
Start: 2021-08-24 | End: 2021-08-24

## 2021-08-24 RX ORDER — SPIRONOLACTONE 25 MG/1
25 TABLET ORAL DAILY
Status: DISCONTINUED | OUTPATIENT
Start: 2021-08-24 | End: 2021-08-27 | Stop reason: HOSPADM

## 2021-08-24 RX ORDER — THALLOUS CHLORIDE TL-201 1 MCI/ML
3 INJECTION, POWDER, LYOPHILIZED, FOR SOLUTION INTRAVENOUS
Status: COMPLETED | OUTPATIENT
Start: 2021-08-24 | End: 2021-08-24

## 2021-08-24 RX ORDER — WARFARIN SODIUM 2 MG/1
4 TABLET ORAL
Status: COMPLETED | OUTPATIENT
Start: 2021-08-24 | End: 2021-08-24

## 2021-08-24 RX ORDER — WARFARIN SODIUM 2.5 MG/1
2.5 TABLET ORAL DAILY
Status: DISCONTINUED | OUTPATIENT
Start: 2021-08-25 | End: 2021-08-26

## 2021-08-24 RX ADMIN — ASPIRIN 81 MG: 81 TABLET, CHEWABLE ORAL at 08:19

## 2021-08-24 RX ADMIN — THALLOUS CHLORIDE TL-201 1 MILLICURIE: 1 INJECTION, POWDER, LYOPHILIZED, FOR SOLUTION INTRAVENOUS at 14:45

## 2021-08-24 RX ADMIN — VALACYCLOVIR HYDROCHLORIDE 500 MG: 500 TABLET, FILM COATED ORAL at 08:19

## 2021-08-24 RX ADMIN — ATORVASTATIN CALCIUM 80 MG: 20 TABLET, FILM COATED ORAL at 21:43

## 2021-08-24 RX ADMIN — SODIUM CHLORIDE, PRESERVATIVE FREE 10 ML: 5 INJECTION INTRAVENOUS at 08:29

## 2021-08-24 RX ADMIN — Medication 1 TABLET: at 17:29

## 2021-08-24 RX ADMIN — LISINOPRIL 5 MG: 5 TABLET ORAL at 08:28

## 2021-08-24 RX ADMIN — ACETAMINOPHEN 650 MG: 325 TABLET ORAL at 03:05

## 2021-08-24 RX ADMIN — SODIUM CHLORIDE, PRESERVATIVE FREE 10 ML: 5 INJECTION INTRAVENOUS at 21:43

## 2021-08-24 RX ADMIN — VALACYCLOVIR HYDROCHLORIDE 500 MG: 500 TABLET, FILM COATED ORAL at 21:42

## 2021-08-24 RX ADMIN — SPIRONOLACTONE 25 MG: 25 TABLET ORAL at 10:32

## 2021-08-24 RX ADMIN — WARFARIN SODIUM 2.5 MG: 2.5 TABLET ORAL at 03:04

## 2021-08-24 RX ADMIN — THALLOUS CHLORIDE TL-201 3 MILLICURIE: 1 INJECTION, POWDER, LYOPHILIZED, FOR SOLUTION INTRAVENOUS at 11:50

## 2021-08-24 RX ADMIN — METOPROLOL SUCCINATE 25 MG: 25 TABLET, EXTENDED RELEASE ORAL at 08:28

## 2021-08-24 RX ADMIN — WARFARIN SODIUM 4 MG: 2 TABLET ORAL at 17:29

## 2021-08-24 ASSESSMENT — PAIN SCALES - GENERAL
PAINLEVEL_OUTOF10: 0
PAINLEVEL_OUTOF10: 0
PAINLEVEL_OUTOF10: 3

## 2021-08-24 NOTE — PROGRESS NOTES
Hospitalist Progress Note      Name:  Mic Mcleod /Age/Sex: 1985  (30 y.o. male)   MRN & CSN:  [id number] & [id number] Admission Date/Time: 2021  4:25 PM   Location:  -A PCP: No primary care provider on file. Hospital Day: 3    Assessment and Plan:   Mic Mcleod is a 30 y.o.  male  who presents with chest pain, expressive aphasia    1) NSTEMI  -S/P LHC which showed 100% occluded prox LAD with collaterals from the Rt filling the LAD  -TTE with EF <20% with apical, septal and anterior wall segments akinesis. No LV thrombus seen.  -Cardiology on board; for viability study  -Continue Metoprolol succinate, ASA, high intensity statin and ACEi    2) Acute Punctate Lt frontal lobe CVA  -Likely embolic from above  -MRI Brain: Small acute punctate infarct within the left frontal lobe involving the insula cortex  -CTA head and neck: Unremarkable  -Pharmacy on board for Coumadin dosing  -Neurology on board    3) Acute systolic HF  -Due to ischemic cardiomyopathy  -EF as above  -Continue anti-failure regimen  -Cardiology following    4) Possible Neurocysticercosis  -CT head: Subtle areas of coarse intraparenchymal calcification within the left superior frontal gyrus subcortical white matter and right inferior frontal gyrus subcortical white matter concerning for neurocysticercosis  -No history of seizures reported  -At this point, no indication for treatment or AED due to chronicity and calcification  -Will need ID to follow    Other Chronic Medical conditions; medication resumed unless contraindicated    -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      Diet ADULT DIET;  Regular   DVT Prophylaxis [] Lovenox, []  Heparin, [] SCDs, [] Ambulation   GI Prophylaxis [] PPI,  [] H2 Blocker,  [] Carafate,  [] Diet/Tube Feeds   Code Status Full Code   Disposition Home   MDM      History of Present Illness:     Patient was seen and examined  Denied any worsening chest pain  No headache or seizure disorders  No acute event overnight    Ten point ROS reviewed negative, unless as noted above    Objective: Intake/Output Summary (Last 24 hours) at 8/24/2021 0951  Last data filed at 8/24/2021 0829  Gross per 24 hour   Intake 10 ml   Output 700 ml   Net -690 ml      Vitals:   Vitals:    08/24/21 0900   BP: 110/76   Pulse: 77   Resp: 20   Temp:    SpO2:      Physical Exam:   GEN Awake male, sitting upright in bed in no apparent distress. Appears given age. EYES Pupils are equally round. No scleral erythema, discharge, or conjunctivitis. HENT Mucous membranes are moist. Oral pharynx without exudates, no evidence of thrush. NECK Supple, no apparent thyromegaly or masses. RESP Clear to auscultation, no wheezes, rales or rhonchi. Symmetric chest movement while on room air. CARDIO/VASC S1/S2 auscultated. Regular rate without appreciable murmurs, rubs, or gallops. No JVD or carotid bruits. Peripheral pulses equal bilaterally and palpable. No peripheral edema. GI Abdomen is soft without significant tenderness, masses, or guarding. Bowel sounds are normoactive. Rectal exam deferred.  No costovertebral angle tenderness. Callejas catheter is not present. HEME/LYMPH No palpable cervical lymphadenopathy and no hepatosplenomegaly. No petechiae or ecchymoses. MSK No gross joint deformities. SKIN Normal coloration, warm, dry. NEURO Cranial nerves appear grossly intact, normal speech, no lateralizing weakness. PSYCH Awake, alert, oriented x 4. Affect appropriate.     Medications:   Medications:    sodium chloride flush  5-40 mL Intravenous 2 times per day    aspirin  81 mg Oral Daily    atorvastatin  80 mg Oral Nightly    metoprolol succinate  25 mg Oral Daily    lisinopril  5 mg Oral Daily    warfarin  2.5 mg Oral Daily    valACYclovir  500 mg Oral Nightly      Infusions:    sodium chloride      sodium

## 2021-08-24 NOTE — CARE COORDINATION
CM into see pt  and pt is not in room. Per notes pt does not have a PCP. Pt does have insurance.  [facility]

## 2021-08-24 NOTE — PROGRESS NOTES
Sent down INR at 2300 after drawn from lab tech. Lab not resulted until 130am. Called lab at 1 am to see why not resulted, they stated that labs are not crossing over.

## 2021-08-24 NOTE — CONSULTS
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 Low probability for Neurocysticercosis.  Left side CVA   Elevated troponin   Multi-morbidity: per PMHx  Plan:   Therapeutic: on Valtrex, follow up with ophthalmology.  Diagnostic: MRI of the brain with contrast as an outpatient   F/u test:     Thank you for allowing me to consult in the care of this patient.  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 [facility]) male with a history of tobacco cigarette smoking, who was admitted 2021 for further evaluation and management of aphasia for 5 minutes. Had a second occurrence in the hospital and had difficulty speaking. Currently receiving valacyclovir for prophylaxis of retinal detachment some years ago. As a part of the stroke work-up he had a CT of the head revealed \"subtle areas of coarse intraparenchymal calcification within the left superior frontal gyrus subcortical white matter and right inferior frontal gyrus subcortical white matter. Findings are nonspecific but may be suggestive of chronic calcified stage neurocysticercosis \"a subsequent MRI of the brain on 2021 showed small acute punctate infarct within the left frontal lobe involving the insular cortex. High signal intensity within the right globe could be related to vitreous hemorrhage versus surgical intervention. \" He reports that he moved to the [address] [place] [address] in 2015. Diagnosed with ARN of right eye retina in 2018. Treated in [facility]. 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 He was born in [facility], and is a Vegan. Denies any known hx of parasitic infection, or meat ingestion   ? Infectious diseases service was consulted to evaluate the pt, and recommend further investigative and therapeutic measures.   Review and summary of old records:  ROS: Other systems reviewed Including eyes, ENT, respiratory, cardiovascular, GI, , dermatologic, neurologic, psych, hem/lymphatic, musculoskeletal and endocrine were negative other than what is mentioned above. Patient Active Problem List    Diagnosis Date Noted    TIA (transient ischemic attack)     [facility] 08/22/2021    Cerebrovascular accident (CVA) due to embolism ([facility] 08/22/2021    Aphasia 08/22/2021     No past medical history on file. No past surgical history on file. No family history on file. Infectious disease related family history - not contibutory. SOCIAL HISTORY  Social History     Tobacco Use    Smoking status: Current Some Day Smoker     Packs/day: 0.50     Start date: 8/22/2021   Substance Use Topics    Alcohol use: Not on file       Born:  Jessica Lemus Occupation:   No recent travel of significance.  No recent unusual exposures.  NO pets  ? ALLERGIES  No Known Allergies   MEDICATIONS  Reviewed and are per the chart/EMR. IMMUNIZATION HISTORY    There is no immunization history on file for this patient. ? Antibiotics:   Valtrex?  -------------------------------------------------------------------------------------------------------------------    Vital Signs:  Vitals:    08/24/21 1000   BP: 103/73   Pulse: 70   Resp: 19   Temp:    SpO2: 97%         Exam:    VS: noted; wt 85.7 kg  Gen: alert and oriented X3, no distress  Skin: no stigmata of endocarditis  Wounds: C/D/I  HEMT: AT/NC Oropharynx pink, moist, and without lesions or exudates; dentition in good state of repair  Eyes: right eye cataract PERRLA, EOMI, conjunctiva pink, sclera anicteric. Neck: Supple. Trachea midline. No LAD. Chest: no distress and CTA. Good air movement. Heart: RRR and no MRG. Abd: soft, non-distended, no tenderness, no hepatomegaly. Normoactive bowel sounds.   Ext: no clubbing, cyanosis, or edema  Catheter Site: without erythema or tenderness  LDA:   Neuro: Mental status intact. CN 2-12 intac    ? Diagnostic Studies: reviewed  ? ? I have examined this patient and available medical records on this date and have made the above observations, conclusions and recommendations.   Electronically signed by: Electronically signed by Azalea Pearson MD on 8/24/2021 at 11:38 AM

## 2021-08-24 NOTE — PROGRESS NOTES
PHARMACY ANTICOAGULATION MONITORING SERVICE    Mariya Salazar is a [address] y.o. male on warfarin therapy for acute CVA. Pharmacy consulted by Dr. Rylie Blakely for monitoring and adjustment of treatment. Indication for anticoagulation: s/p acute CVA, likely embolic from NSTEMI  - cardiology notes small infarct with low risk hemorrhagic conversion  INR goal: 2-3  Warfarin dose prior to admission: N/A - new start    Pertinent Laboratory Values   Recent Labs     08/22/21  1756 08/22/21  1756 08/22/21  2053 08/23/21  2220 08/24/21  0756   INR 1.00  --   --    < > 1.02   HGB 15.9   < > 16.2  --  15.3   HCT 50.7   < > 52.6*  --  48.6     --  271  --  230    < > = values in this interval not displayed.        Assessment/Plan:   Possible DDI:   o Aspirin 81 mg daily  o Heparin bridge now stopped per Dr. Olman Kohli INR = 1.02, sub-therapeutic   Plan to give warfarin 4 mg x1 followed by warfarin 2.5 mg daily with further adjustments based on INR trends  o Cautious dose titration with recent CVA   Pharmacy will continue to monitor and adjust warfarin therapy as indicated    Thank you for the consult,  Melodie BIRCH [facility], PharmD  8/24/2021 4:39 PM

## 2021-08-24 NOTE — FLOWSHEET NOTE
Right femoral sheath pulled and pressure held for 15 minutes. PT and DP pulses palpable. Area cleansed and tegaderm applied over site. Noted a pea size firm area over insertion site. Ping Cedeno RN informed of assessment. Patient instructed on not moving the right leg for 6 hours but may bend the left leg. Urinal given. Instructed to let the nurse know if he feels warm drainage coming from his groin.  Emotional support given

## 2021-08-24 NOTE — PROGRESS NOTES
Physical Exam:  Constitutional:  Well developed, Well nourished, No acute distress, Non-toxic appearance. HENT:  Normocephalic, Atraumatic, Bilateral external ears normal, Oropharynx moist, No oral exudates, Nose normal. Neck- Normal range of motion, No tenderness, Supple, No stridor. Eyes:  PERRL, EOMI, Conjunctiva normal, No discharge. Respiratory:  Normal breath sounds, No respiratory distress, No wheezing, No chest tenderness. Cardiovascular:  Normal heart rate, Normal rhythm, No murmurs, No rubs, No gallops, JVP not elevated  Abdomen/GI:  Bowel sounds normal, Soft, No tenderness, No masses, No pulsatile masses. Musculoskeletal:  Intact distal pulses, No edema, No tenderness, No cyanosis, No clubbing. Good range of motion in all major joints. No tenderness to palpation or major deformities noted. Back- No tenderness. Integument:  Warm, Dry, No erythema, No rash. Lymphatic:  No lymphadenopathy noted. Neurologic:  Alert & oriented x 3, Normal motor function, Normal sensory function, No focal deficits noted.    Psychiatric:  Affect  and  Mood :no change    Medications:    spironolactone  25 mg Oral Daily    sodium chloride flush  5-40 mL IntraVENous 2 times per day    aspirin  81 mg Oral Daily    atorvastatin  80 mg Oral Nightly    metoprolol succinate  25 mg Oral Daily    lisinopril  5 mg Oral Daily    warfarin  2.5 mg Oral Daily    valACYclovir  500 mg Oral Nightly      sodium chloride      sodium chloride Stopped (08/23/21 0410)    heparin (PORCINE) Infusion 16.6 mL/hr (08/23/21 1340)     sodium chloride flush, sodium chloride, ondansetron **OR** ondansetron, acetaminophen **OR** acetaminophen, polyethylene glycol, heparin (porcine), heparin (porcine)    Lab Data:  CBC:   Recent Labs     08/22/21  1756 08/22/21  2053 08/24/21  0756   WBC 11.1* 11.0* 10.1   HGB 15.9 16.2 15.3   HCT 50.7 52.6* 48.6   MCV 84.4 84.6 85.0    271 230     BMP:   Recent Labs     08/22/21  1756   NA 134*   K 4.2   CL 99   CO2 25   BUN 12   CREATININE 1.3     PT/INR:   Recent Labs     08/22/21  1756 08/23/21  2220 08/24/21  0756   PROTIME 12.9 13.3 13.2   INR 1.00 1.03 1.02     BNP:  No results for input(s): PROBNP in the last 72 hours. TROPONIN:   Recent Labs     08/22/21  1756 08/22/21  2053   TROPONINT 2.300* 2.320*        ECHO :   echocardiogram    Assessment:  30 y. o.year old who is admitted for  Chief Complaint   Patient presents with    Numbness     Jaw since 10 am    , active issues as noted below:  Impression:  Active Problems:    NSTEMI (non-ST elevated myocardial infarction) ([facility] [address]    Cerebrovascular accident (CVA) due to embolism ([facility] [address]    Aphasia    TIA (transient ischemic attack)  Resolved Problems:    * No resolved hospital problems. *            All labs, medications and tests reviewed by myself , continue all other medications of all above medical condition listed as is except for changes mentioned above. Thank you very much for consult , please call with questions.     Jacqueline Mo MD, MD 8/24/2021 3:53 PM

## 2021-08-25 ENCOUNTER — APPOINTMENT (OUTPATIENT)
Dept: MRI IMAGING | Age: 36
DRG: 064 | End: 2021-08-25
Payer: COMMERCIAL

## 2021-08-25 LAB
APTT: 36.5 SECONDS (ref 25.1–37.1)
INR BLD: 1 INDEX
PROTHROMBIN TIME: 12.9 SECONDS (ref 11.7–14.5)
VITAMIN B-12: 293 PG/ML (ref 211–911)

## 2021-08-25 PROCEDURE — 99233 SBSQ HOSP IP/OBS HIGH 50: CPT | Performed by: INTERNAL MEDICINE

## 2021-08-25 PROCEDURE — 94761 N-INVAS EAR/PLS OXIMETRY MLT: CPT

## 2021-08-25 PROCEDURE — 6370000000 HC RX 637 (ALT 250 FOR IP): Performed by: INTERNAL MEDICINE

## 2021-08-25 PROCEDURE — 85610 PROTHROMBIN TIME: CPT

## 2021-08-25 PROCEDURE — 2580000003 HC RX 258: Performed by: INTERNAL MEDICINE

## 2021-08-25 PROCEDURE — 2060000000 HC ICU INTERMEDIATE R&B

## 2021-08-25 PROCEDURE — 36415 COLL VENOUS BLD VENIPUNCTURE: CPT

## 2021-08-25 PROCEDURE — 82607 VITAMIN B-12: CPT

## 2021-08-25 PROCEDURE — 6370000000 HC RX 637 (ALT 250 FOR IP): Performed by: PSYCHIATRY & NEUROLOGY

## 2021-08-25 PROCEDURE — 85730 THROMBOPLASTIN TIME PARTIAL: CPT

## 2021-08-25 PROCEDURE — 6360000004 HC RX CONTRAST MEDICATION: Performed by: PSYCHIATRY & NEUROLOGY

## 2021-08-25 PROCEDURE — A9579 GAD-BASE MR CONTRAST NOS,1ML: HCPCS | Performed by: PSYCHIATRY & NEUROLOGY

## 2021-08-25 PROCEDURE — 70552 MRI BRAIN STEM W/DYE: CPT

## 2021-08-25 RX ADMIN — METOPROLOL SUCCINATE 25 MG: 25 TABLET, EXTENDED RELEASE ORAL at 10:21

## 2021-08-25 RX ADMIN — SPIRONOLACTONE 25 MG: 25 TABLET ORAL at 10:21

## 2021-08-25 RX ADMIN — ASPIRIN 81 MG: 81 TABLET, CHEWABLE ORAL at 10:21

## 2021-08-25 RX ADMIN — SODIUM CHLORIDE, PRESERVATIVE FREE 10 ML: 5 INJECTION INTRAVENOUS at 10:21

## 2021-08-25 RX ADMIN — LISINOPRIL 5 MG: 5 TABLET ORAL at 10:21

## 2021-08-25 RX ADMIN — WARFARIN SODIUM 2.5 MG: 2.5 TABLET ORAL at 18:18

## 2021-08-25 RX ADMIN — GADOTERIDOL 17 ML: 279.3 INJECTION, SOLUTION INTRAVENOUS at 18:00

## 2021-08-25 RX ADMIN — SODIUM CHLORIDE, PRESERVATIVE FREE 10 ML: 5 INJECTION INTRAVENOUS at 20:22

## 2021-08-25 RX ADMIN — Medication 1 TABLET: at 10:21

## 2021-08-25 RX ADMIN — VALACYCLOVIR HYDROCHLORIDE 500 MG: 500 TABLET, FILM COATED ORAL at 20:22

## 2021-08-25 RX ADMIN — ATORVASTATIN CALCIUM 80 MG: 20 TABLET, FILM COATED ORAL at 20:22

## 2021-08-25 ASSESSMENT — PAIN SCALES - GENERAL
PAINLEVEL_OUTOF10: 0

## 2021-08-25 NOTE — FLOWSHEET NOTE
Patients HS meds given an asked to heat up the meal friends brought from home. Given is rice meal as asked.  Taking well

## 2021-08-25 NOTE — PROGRESS NOTES
NEUROLOGY NOTE  DR. Tam David MD.  -------------------------------------------------  Subjective:    Doing better. Denies any new symptoms. Denies headache nausea vomiting dizziness    Denies numbness weakness extremities    Denies blurring of vision double vision    Objective:    BP 86/72   Pulse 99   Temp 98.6 °F (37 °C) (Oral)   Resp 18   Ht 5' 10\" (1.778 m)   Wt 189 lb (85.7 kg)   SpO2 99%   BMI 27.12 kg/m²   HEENT nl      Neuro exam    Alert Oriented  X 3  Follow simple commands  EOMI Pupils 3 mm maura  5/5 all 4 extremities      RADIOLOGY  -----------------    ECHO Complete 2D W Doppler W Color    Result Date: 8/23/2021  Transthoracic Echocardiography Report (TTE)  Demographics   Patient Name       Derick Ramírez Date of Study       08/23/2021   Date of Birth      1985         Gender              Male   Age                59 [facility] year(s)         Race                Other   Patient Number     [id number]         Room Number         YG71   Visit Number       [id number]   Corporate ID       [id number]   Accession Number   [id number]         Paty Masters RDMS   Ordering Physician Fernanda Tripathi MD                 Physician           MD  Procedure Type of Study   TTE procedure:ECHOCARDIOGRAM COMPLETE 2D W DOPPLER W COLOR. Procedure Date Date: 08/23/2021 Start: 07:46 AM Study Location: Portable Technical Quality: Good visualization Indications:NSTEMI. Patient Status: Routine Height: 70 inches Weight: 189 pounds BSA: 2.04 m2 BMI: 27.12 kg/m2 HR: 84 bpm BP: 106/76 mmHg  Conclusions   Summary  Left ventricular systolic function is abnormal.  Ejection fraction is visually estimated at <20%. Apical, septal, and anterior wall segments are akinetic. Apical ballooning noted. Severly dilated left ventricle measuring 7.0cm in diastole.   No evidence of any pericardial effusion. Repeat study with Definity to rule out LV thrombus   Signature   ------------------------------------------------------------------  Electronically signed by Michelle Thao MD (Interpreting  physician) on 08/23/2021 at 09:37 AM  ------------------------------------------------------------------   Findings   Left Ventricle  Left ventricular systolic function is abnormal.  Ejection fraction is visually estimated at <20%. Apical, septal, and anterior wall segments are akinetic. Apical ballooning noted. Severly dilated left ventricle measuring 7.0cm in diastole. Indeterminate diastolic function; E/A flow reversal is noted. Left Atrium  Essentially normal left atrium. Right Atrium  Essentially normal right atrium. Right Ventricle  Essentially normal right ventricle. Aortic Valve  Structurally normal aortic valve. Mitral Valve  Mild mitral regurgitation. Tricuspid Valve  Mild tricuspid regurgitation; RVSP: 30 mmHg. Pulmonic Valve  The pulmonic valve was not well visualized. Pericardial Effusion  No evidence of any pericardial effusion. Pleural Effusion  No evidence of pleural effusion. Miscellaneous  The aorta is within normal limits.   M-Mode/2D Measurements & Calculations   LV Diastolic Dimension:  LV Systolic Dimension:  LA Dimension: 3.9 cmAO Root  6.98 cm                  6.25 cm                 Dimension: 2.9 cmLA Area:  LV FS:10.5 %             LV Volume Diastolic:    08.5 cm2  LV PW Diastolic: 5.17 cm 273 ml  LV PW Systolic: 2.42 cm  LV Volume Systolic: 90  Septum Diastolic: 4.73   ml  cm                       LV EDV/LV EDV Index:    RV Diastolic Dimension:  Septum Systolic: 8.51 cm 126 YM/06 m2LV ESV/LV   2.52 cm  CO: 4.25 l/min           ESV Index: 90 ml/44 m2  CI: 2.08 l/m*m2          EF Calculated (A4C):    LA/Aorta: 1.34                           37.9 %  LV Area Diastolic: 38.6  EF Calculated (2D): 22  LA volume/Index: 38 ml  cm2                      % /12Z0  LV Area Systolic: 29 cm2                           LV Length: 7.74 cm                            LVOT: 2.1 cm  Doppler Measurements & Calculations   MV Peak E-Wave: 60.2 AV Peak Velocity: 91.9 cm/s   LVOT Peak Velocity: 72.2  cm/s                 AV Peak Gradient: 3.38 mmHg   cm/s  MV Peak A-Wave: 80.5 AV Mean Velocity: 72.2 cm/s   LVOT Mean Velocity: 46.8  cm/s                 AV Mean Gradient: 2 mmHg      cm/s  MV E/A Ratio: 0.75   AV VTI: 20.8 cm               LVOT Peak Gradient: 2  MV Peak Gradient:    AV Area (Continuity):2.43 cm2 mmHgLVOT Mean Gradient: 1  1.45 mmHg                                          mmHg                       LVOT VTI: 14.6 cm             Estimated RVSP: 30 mmHg  MV P1/2t: 28 msec                                  Estimated RAP:3 mmHg  MVA by PHT:7.86 cm2  Estimated PASP: 14.42 mmHg   MV E' Septal                                       TR Velocity:169 cm/s  Velocity: 6.14 cm/s                                TR Gradient:11.42 mmHg  MV E' Lateral  Velocity: 4.61 cm/s  MV E/E' septal: 9.8  MV E/E' lateral:  13.06      Echocardiogram limited    Result Date: 8/23/2021  Transthoracic Echocardiography Report (TTE)  Demographics   Patient Name       Kinsey Romero Date of Study       08/23/2021   Date of Birth      1985         Gender              Male   Age                30 year(s)         Race                Other   Patient Number     [id number]         Room Number         UE02   Visit Number       [id number]   Corporate ID       [id number]   Accession Number   [id number]         Palmira Christy RVT   Ordering Physician Marianna Ferguson MD                 Physician           MD  Procedure Type of Study   TTE procedure:ECHOCARDIOGRAM LIMITED.   Procedure Date Date: 08/23/2021 Start: 10:37 AM Study Location: Portable Technical Quality: Fair visualization Indications:Low Ejection Fraction. Patient Status: Routine Contrast Medium: Definity. Amount - 2 ml Height: 70 inches Weight: 189 pounds BSA: 2.04 m2 BMI: 27.12 kg/m2  Conclusions   Summary  Left ventricular systolic function is abnormal.  Ejection fraction is visually estimated at <20%. Apical, septal, and anterior wall segments are akinetic. Apical ballooning noted. No evidence of thrombus; however, slow flow noted near the apical inferior  wall segment. No evidence of any pericardial effusion. Signature   ------------------------------------------------------------------  Electronically signed by Tommy Albarran MD (Interpreting  physician) on 08/23/2021 at 11:52 AM  ------------------------------------------------------------------   Findings   Left Ventricle  Left ventricular systolic function is abnormal.  Ejection fraction is visually estimated at <20%. Apical, septal, and anterior wall segments are akinetic. Apical ballooning noted. No evidence of thrombus. Slow flow noted near the apical inferior wall  segment. Pericardial Effusion  No evidence of any pericardial effusion. Pleural Effusion  No evidence of pleural effusion. CT HEAD WO CONTRAST    Result Date: 8/22/2021  EXAMINATION: CT OF THE HEAD WITHOUT CONTRAST  8/22/2021 5:19 pm TECHNIQUE: CT of the head was performed without the administration of intravenous contrast. Dose modulation, iterative reconstruction, and/or weight based adjustment of the mA/kV was utilized to reduce the radiation dose to as low as reasonably achievable. COMPARISON: None. HISTORY: ORDERING SYSTEM PROVIDED HISTORY: dysphasia TECHNOLOGIST PROVIDED HISTORY: Has a \"code stroke\" or \"stroke alert\" been called? ->Yes Reason for exam:->dysphasia Decision Support Exception - unselect if not a suspected or confirmed emergency medical condition->Emergency Medical Condition (MA) Reason for Exam: dysphasia Acuity: Acute Type of Exam: Initial FINDINGS: BRAIN/VENTRICLES: There is no acute intracranial hemorrhage, mass effect or midline shift. No abnormal extra-axial fluid collection. The gray-white differentiation is maintained without evidence of an acute infarct. There is no evidence of hydrocephalus. There are subtle areas of coarse intraparenchymal calcification within the left superior frontal gyrus subcortical white matter and right inferior frontal gyrus subcortical white matter ORBITS: The lens within the right globe is not visualized and there is hyperdensity filling the vitreous of the right globe. The visualized portion of the orbits demonstrate no acute abnormality. SINUSES: Mild mucosal thickening of the ethmoidal air cells. The visualized paranasal sinuses and mastoid air cells demonstrate no acute abnormality. SOFT TISSUES/SKULL:  No acute abnormality of the visualized skull or soft tissues. No acute intracranial abnormality. No acute territorial infarction, intracranial hemorrhage or mass lesion. There are subtle areas of coarse intraparenchymal calcification within the left superior frontal gyrus subcortical white matter and right inferior frontal gyrus subcortical white matter. Findings are nonspecific but may be suggestive chronic calcified stage is neurocysticercosis in correct clinical setting. The lens within the right globe is not visualized and there is hyperdensity filling the vitreous of the right globe. The finding is likely related to prior surgical intervention for retinal detachment. Clinical correlation is recommended. Critical results were called by Dr. Pranav Mcconnell MD to Dr. Breezy Rothman on 8/22/2021 at 17:31. XR CHEST PORTABLE    Result Date: 8/22/2021  EXAMINATION: ONE XRAY VIEW OF THE CHEST 8/22/2021 6:02 pm COMPARISON: None.  HISTORY: ORDERING SYSTEM PROVIDED HISTORY: dysphasia TECHNOLOGIST PROVIDED HISTORY: Reason for exam:->dysphasia Reason for Exam: stroke protocol, dysphasia, numbness Acuity: Acute Type of Exam: Initial FINDINGS: The lungs are without acute focal process. There is no effusion or pneumothorax. The cardiomediastinal silhouette is without acute process. The osseous structures are without acute process. No acute process. CTA NECK W CONTRAST    Result Date: 8/22/2021  EXAMINATION: CTA OF THE NECK; CTA OF THE HEAD WITH CONTRAST 8/22/2021 5:43 pm; 8/22/2021 5:40 pm: TECHNIQUE: CTA of the neck was performed with the administration of intravenous contrast. Multiplanar reformatted images are provided for review. MIP images are provided for review. Stenosis of the internal carotid arteries measured using NASCET criteria. Dose modulation, iterative reconstruction, and/or weight based adjustment of the mA/kV was utilized to reduce the radiation dose to as low as reasonably achievable.; CTA of the head/brain was performed with the administration of intravenous contrast. Multiplanar reformatted images are provided for review. MIP images are provided for review. Dose modulation, iterative reconstruction, and/or weight based adjustment of the mA/kV was utilized to reduce the radiation dose to as low as reasonably achievable. COMPARISON: None. HISTORY: ORDERING SYSTEM PROVIDED HISTORY: dysphasia TECHNOLOGIST PROVIDED HISTORY: Has a \"code stroke\" or \"stroke alert\" been called? ->Yes Reason for exam:->dysphasia Decision Support Exception - unselect if not a suspected or confirmed emergency medical condition->Emergency Medical Condition (MA) Reason for Exam: dysphasia Acuity: Acute Type of Exam: Initial FINDINGS: CTA NECK: AORTIC ARCH/ARCH VESSELS: No dissection or arterial injury. No significant stenosis of the brachiocephalic or subclavian arteries. CAROTID ARTERIES: No dissection, arterial injury, or hemodynamically significant stenosis by NASCET criteria. VERTEBRAL ARTERIES: No dissection, arterial injury, or significant stenosis. SOFT TISSUES: The lung apices are clear.   No cervical or superior mediastinal lymphadenopathy. The larynx and pharynx are unremarkable. No acute abnormality of the salivary and thyroid glands. BONES: No acute osseous abnormality. CTA HEAD: ANTERIOR CIRCULATION: No significant stenosis of the intracranial internal carotid, anterior cerebral, or middle cerebral arteries. No aneurysm. Right SHUN A1 segment is hypoplastic. POSTERIOR CIRCULATION: No significant stenosis of the vertebral, basilar, or posterior cerebral arteries. No aneurysm. OTHER: No dural venous sinus thrombosis on this non-dedicated study. BRAIN: No mass effect or midline shift. No extra-axial fluid collection. The gray-white differentiation is maintained. Unremarkable CTA of the head and neck. CTA HEAD W CONTRAST    Result Date: 8/22/2021  EXAMINATION: CTA OF THE NECK; CTA OF THE HEAD WITH CONTRAST 8/22/2021 5:43 pm; 8/22/2021 5:40 pm: TECHNIQUE: CTA of the neck was performed with the administration of intravenous contrast. Multiplanar reformatted images are provided for review. MIP images are provided for review. Stenosis of the internal carotid arteries measured using NASCET criteria. Dose modulation, iterative reconstruction, and/or weight based adjustment of the mA/kV was utilized to reduce the radiation dose to as low as reasonably achievable.; CTA of the head/brain was performed with the administration of intravenous contrast. Multiplanar reformatted images are provided for review. MIP images are provided for review. Dose modulation, iterative reconstruction, and/or weight based adjustment of the mA/kV was utilized to reduce the radiation dose to as low as reasonably achievable. COMPARISON: None. HISTORY: ORDERING SYSTEM PROVIDED HISTORY: dysphasia TECHNOLOGIST PROVIDED HISTORY: Has a \"code stroke\" or \"stroke alert\" been called? ->Yes Reason for exam:->dysphasia Decision Support Exception - unselect if not a suspected or confirmed emergency medical condition->Emergency Medical Condition (MA) Reason for Exam: dysphasia Acuity: Acute Type of Exam: Initial FINDINGS: CTA NECK: AORTIC ARCH/ARCH VESSELS: No dissection or arterial injury. No significant stenosis of the brachiocephalic or subclavian arteries. CAROTID ARTERIES: No dissection, arterial injury, or hemodynamically significant stenosis by NASCET criteria. VERTEBRAL ARTERIES: No dissection, arterial injury, or significant stenosis. SOFT TISSUES: The lung apices are clear. No cervical or superior mediastinal lymphadenopathy. The larynx and pharynx are unremarkable. No acute abnormality of the salivary and thyroid glands. BONES: No acute osseous abnormality. CTA HEAD: ANTERIOR CIRCULATION: No significant stenosis of the intracranial internal carotid, anterior cerebral, or middle cerebral arteries. No aneurysm. Right SHUN A1 segment is hypoplastic. POSTERIOR CIRCULATION: No significant stenosis of the vertebral, basilar, or posterior cerebral arteries. No aneurysm. OTHER: No dural venous sinus thrombosis on this non-dedicated study. BRAIN: No mass effect or midline shift. No extra-axial fluid collection. The gray-white differentiation is maintained. Unremarkable CTA of the head and neck. MRI BRAIN WO CONTRAST    Result Date: 8/23/2021  EXAMINATION: MRI OF THE BRAIN WITHOUT CONTRAST  8/23/2021 9:55 am TECHNIQUE: Multiplanar multisequence MRI of the brain was performed without the administration of intravenous contrast. COMPARISON: CT head 08/22/2021 HISTORY: ORDERING SYSTEM PROVIDED HISTORY: Acute CVA,  chronic calcified stage is neurocysticercosis? TECHNOLOGIST PROVIDED HISTORY: Reason for exam:->Acute CVA,  chronic calcified stage is neurocysticercosis? Reason for Exam: evaluate for CVA, extremity numbness Acuity: Acute Type of Exam: Initial FINDINGS: INTRACRANIAL STRUCTURES/VENTRICLES: Small acute punctate infarct is identified within the left insular cortex within the frontal lobe. no mass effect or midline shift.  No evidence of an acute intracranial hemorrhage. The ventricles and sulci are normal in size and configuration. The sellar/suprasellar regions appear unremarkable. The normal signal voids within the major intracranial vessels appear maintained. ORBITS: Hypertense signal identified within the right globe which could be related to prior orbital hemorrhage silicone injection. SINUSES: Mild mucosal thickening is identified within the paranasal sinuses. BONES/SOFT TISSUES: The bone marrow signal intensity appears normal. The soft tissues demonstrate no acute abnormality. Small acute punctate infarct within the left frontal lobe involving the insular cortex. High signal intensity within the right globe could be related to vitreous hemorrhage versus surgical intervention.      NM MYOCARD INFARCT SPECT SCAN    Result Date: 8/24/2021  Cardiac Perfusion Imaging   Demographics   Patient Name      Urvashi Ramirez Date of study        08/24/2021   Date of Birth     1985         Gender               Male   Age               30 year(s)         Race                 Other   Patient Number    [id number]         Room Number          2128   Visit Number      [id number]          Height               70 inches   Corporate ID      [id number]           Weight               189 pounds   Accession Number  [id number]                                        NM Technologist      Flaquita Urbina, [address] [place] [address], Sayed Imani Briseno  Physician         MD                 Cardiologist         MD   Conclusions   Summary  Apical infarct with no perfusion in delayed images  Anterior , septal , inferior and lateral walls have perfusion in delayed  images   Signatures   ------------------------------------------------------------------  Electronically signed by Piedad Womack MD (Interpreting  cardiologist) on 08/24/2021 at 18:09  ------------------------------------------------------------------  Procedure Admit Source:Emergency department. Procedure Type:   Viability study:RESTING THALL VIABIL W/REINJ MED  Indications: To evaluate viability. Risk Factors   The patient risk factors include:cerebrovascular disease, Current - Every  day tobacco use, treated hypertension, last creatinine: 1.3 mg/dl and  creatinine clearance: 97.09 ml/min. Viability Imaging Protocols   Isotope: Thallium 201       Scan times  Isotope dose:3 mCi          1st: Rest  Administration route: I.V.  2nd: Rest  Date: 08/24/2021 11:30  Isotope Reinjection  Isotope dose:1.1 mCi  Administration route: I.V. Date: 08/24/2021 14:45   Technique:      Rest reinjection                  SPECT  Medical History   Accession#:  [id number]  Admission Data Admission date: 08/22/2021 Admission Time: 16:25 Insurance payors: Private health insurance. Hospital Status: Inpatient.       LAB RESULTS  --------------------    Recent Results (from the past 24 hour(s))   CBC    Collection Time: 08/24/21  7:56 AM   Result Value Ref Range    WBC 10.1 4.0 - 10.5 K/CU MM    RBC 5.72 4.6 - 6.2 M/CU MM    Hemoglobin 15.3 13.5 - 18.0 GM/DL    Hematocrit 48.6 42 - 52 %    MCV 85.0 78 - 100 FL    MCH 26.7 (L) 27 - 31 PG    MCHC 31.5 (L) 32.0 - 36.0 %    RDW 14.5 11.7 - 14.9 %    Platelets 144 175 - 558 K/CU MM    MPV 10.2 7.5 - 11.1 FL   Protime-INR    Collection Time: 08/24/21  7:56 AM   Result Value Ref Range    Protime 13.2 11.7 - 14.5 SECONDS    INR 1.02 INDEX   Homocysteine, Serum    Collection Time: 08/24/21  7:56 AM   Result Value Ref Range    Homocysteine 19.7 (H) 0 - 10 umol/L   Vitamin B12 & Folate    Collection Time: 08/24/21  7:56 AM   Result Value Ref Range    Vitamin B-12 150.0 (L) 211 - 911 pg/ml    Folate 5.5 3.1 - 17.5 NG/ML   TSH without Reflex    Collection Time: 08/24/21  7:56 AM   Result Value Ref Range    TSH, High Sensitivity 1.310 0.270 - 4.20 uIu/ml   APTT    Collection Time: 08/24/21  7:56 AM   Result Value Ref Range    aPTT 37.8 (H) 25.1 - 37.1 SECONDS Medical problems    Patient Active Problem List:     NSTEMI (non-ST elevated myocardial infarction) ([facility] [address]     Cerebrovascular accident (CVA) due to embolism ([facility] [address]     Aphasia     TIA (transient ischemic attack)      ASSESSMENT:  ---------------------    Left Frontal acute infarct r/o cardio carotid embolic event- cardioembolic event from low EF 20 % is a possibility-cardiology is suspicious of an apical Ventricular thrombotic etiology and started pt on asa and coumadin. CTA head neck was neg for carotid stenosis.     Left Frontal calcific lesion-cysticercosis or other non-specific inflammatory infectious possibilities were entertained on discussion with Dr. Arsen Kelley Neuro-radiologist on the phone after looking at the CT and Mri brain-discussed option of treating with anti-parasitic meds and discussed possibile complications of inducing inflammatory reaction in a calcific lesion that more likely has been there for some time and possibly chronic that is highly likely asymptomatic at [facility] time and patient chose not to be treated with anti-parasitic meds at this time. Will do serial CT brain every 6-12  Months to assess left frontal calcific lesion. discussed option of using seizure med and as pt has not had any seizures discussed with pt that there is a possibility of having seizures and pt again chose not to be treated with seizure med at this time and seems reasonable.     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     discussed dx prognosis meds side effects and above with pt and answered all questions.         Electronically signed by Derik Whittaker MD on 8/24/2021 at 10:52 PM

## 2021-08-25 NOTE — CARE COORDINATION
Spoke with patient at bedside . Patient is from home and is normally independent. Patient has insurance to assist with RX coverage but does not have a PCP. PCP list provided for patient. Patient plans to return home on discharge and stated that he has friends that can assist him at home if needed. No other needs identified at this time.

## 2021-08-25 NOTE — PROGRESS NOTES
RN reviewed echo results and recommended [facility] Vest to Una Holden NP. See new orders for [address] contacted and paperwork faxed at this time.

## 2021-08-25 NOTE — PROGRESS NOTES
Cardiology Progress Note     Admit Date:  8/22/2021    Consult reason/ Seen today for :   NSTEMI  CVA    Subjective and  Overnight Events :  Echo shows reduced EF of 25-30 % . Cardiac cath showed LAD thrombus with 100% occluded LAD MRI confirms acute frontal stroke      Chief complain on admission : 30 y. o.year old who is admitted for  Chief Complaint   Patient presents with    Numbness     Jaw since 10 am      Assessment / Plan:  ASCVD: NSTEMI: Ischemic cardiomyopathy with anterior wall infarct septal Q waves. Apical infarct , ant wall is viable will follow up as outpatient , ok to discharge  Cardiomyopathy: EF of 20% start Toprol-XL lisinopril  Acute CVA: Possible embolic stroke from LV due to severe cardiomyopathy, although Definity did not show thrombus, started Coumadin if okay later today due to small infarct(low risk for hemorrhagic conversion)  Neurocysticercosis as per  infectious disease evaluation  HTN: stable, continue To titrate up medication as needed  DVT Prophylaxis if no contraindication    Past medical history:    has no past medical history on file. Past surgical history:   has no past surgical history on file. Social History:   reports that he has been smoking. He started smoking 3 days ago. He has been smoking about 0.50 packs per day. He does not have any smokeless tobacco history on file. He reports that he does not use drugs. Family history:  family history is not on file.     No Known Allergies    Review of Systems:    All 14 systems were reviewed and are negative  Except for the positive findings  which as documented     BP 94/63   Pulse 79   Temp 98.4 °F (36.9 °C) (Oral)   Resp 19   Ht 5' 10\" (1.778 m)   Wt 189 lb (85.7 kg)   SpO2 99%   BMI 27.12 kg/m²       Intake/Output Summary (Last 24 hours) at 8/25/2021 0756  Last data filed at 8/24/2021 0829  Gross per 24 hour   Intake 10 ml   Output --   Net 10 ml     Physical Exam:  Constitutional:  Well developed, Well nourished, No acute distress, Non-toxic appearance. HENT:  Normocephalic, Atraumatic, Bilateral external ears normal, Oropharynx moist, No oral exudates, Nose normal. Neck- Normal range of motion, No tenderness, Supple, No stridor. Eyes:  PERRL, EOMI, Conjunctiva normal, No discharge. Respiratory:  Normal breath sounds, No respiratory distress, No wheezing, No chest tenderness. Cardiovascular:  Normal heart rate, Normal rhythm, No murmurs, No rubs, No gallops, JVP not elevated  Abdomen/GI:  Bowel sounds normal, Soft, No tenderness, No masses, No pulsatile masses. Musculoskeletal:  Intact distal pulses, No edema, No tenderness, No cyanosis, No clubbing. Good range of motion in all major joints. No tenderness to palpation or major deformities noted. Back- No tenderness. Integument:  Warm, Dry, No erythema, No rash. Lymphatic:  No lymphadenopathy noted. Neurologic:  Alert & oriented x 3, Normal motor function, Normal sensory function, No focal deficits noted.    Psychiatric:  Affect  and  Mood :no change    Medications:    spironolactone  25 mg Oral Daily    folic acid-pyridoxine-cyancobalamin  1 tablet Oral Daily    warfarin  2.5 mg Oral Daily    sodium chloride flush  5-40 mL IntraVENous 2 times per day    aspirin  81 mg Oral Daily    atorvastatin  80 mg Oral Nightly    metoprolol succinate  25 mg Oral Daily    lisinopril  5 mg Oral Daily    valACYclovir  500 mg Oral Nightly      sodium chloride      sodium chloride Stopped (08/23/21 0410)     sodium chloride flush, sodium chloride, ondansetron **OR** ondansetron, acetaminophen **OR** acetaminophen, polyethylene glycol    Lab Data:  CBC:   Recent Labs     08/22/21  1756 08/22/21  2053 08/24/21  0756   WBC 11.1* 11.0* 10.1   HGB 15.9 16.2 15.3   HCT 50.7 52.6* 48.6   MCV 84.4 84.6 85.0    271 230     BMP:   Recent Labs     08/22/21  1756   *   K 4.2   CL 99   CO2

## 2021-08-25 NOTE — PLAN OF CARE
Problem: Pain:  Description: Pain management should include both nonpharmacologic and pharmacologic interventions.   Goal: Pain level will decrease  Description: Pain level will decrease  Outcome: Ongoing  Goal: Control of acute pain  Description: Control of acute pain  Outcome: Ongoing  Goal: Control of chronic pain  Description: Control of chronic pain  Outcome: Ongoing  Goal: Patient's pain/discomfort is manageable  Description: Patient's pain/discomfort is manageable  Outcome: Ongoing     Problem: Falls - Risk of:  Goal: Will remain free from falls  Description: Will remain free from falls  Outcome: Ongoing  Goal: Absence of physical injury  Description: Absence of physical injury  Outcome: Ongoing     Problem: Infection:  Goal: Will remain free from infection  Description: Will remain free from infection  Outcome: Ongoing     Problem: Safety:  Goal: Free from accidental physical injury  Description: Free from accidental physical injury  Outcome: Ongoing  Goal: Free from intentional harm  Description: Free from intentional harm  Outcome: Ongoing     Problem: Daily Care:  Goal: Daily care needs are met  Description: Daily care needs are met  Outcome: Ongoing     Problem: Skin Integrity:  Goal: Skin integrity will stabilize  Description: Skin integrity will stabilize  Outcome: Ongoing     Problem: Discharge Planning:  Goal: Patients continuum of care needs are met  Description: Patients continuum of care needs are met  Outcome: Ongoing

## 2021-08-25 NOTE — PROGRESS NOTES
PHARMACY ANTICOAGULATION MONITORING SERVICE    Kari Palmer is a 30 y.o. male on warfarin therapy for acute CVA. Pharmacy consulted by Dr. Aretha Robb for monitoring and adjustment of treatment. Indication for anticoagulation: s/p acute CVA, likely embolic from NSTEMI  - cardiology notes small infarct with low risk hemorrhagic conversion  INR goal: 2-3  Warfarin dose prior to admission: N/A - new start    Pertinent Laboratory Values   Recent Labs     08/22/21  1756 08/22/21  1756 08/22/21  2053 08/23/21  2220 08/24/21  0756 08/24/21  0756 08/25/21  0111   INR 1.00  --   --    < > 1.02   < > 1.00   HGB 15.9   < > 16.2  --  15.3  --   --    HCT 50.7   < > 52.6*  --  48.6  --   --      --  271  --  230  --   --     < > = values in this interval not displayed. Assessment/Plan:   Possible DDI:   o Aspirin 81 mg daily  o Heparin bridge now stopped per Dr. Keyla Guevara [stopped mid-day on 8/24].  INR = 1.00, sub-therapeutic   Plan to give warfarin 4 mg x1 followed by warfarin 2.5 mg daily with further adjustments based on INR trends [ 2 different times, 2 different doses yesterday = total of 6.5mg po on 8/24.    CONTINUE SAME DOSE AND FREQUENCY = warfarin 2.5mg po qday.  o Cautious dose titration with recent CVA   Pharmacy will continue to monitor and adjust warfarin therapy as indicated    Thank you for the consult,  Vernon Brown, [address] [facility]  8/25/2021 1:22 PM

## 2021-08-25 NOTE — PROGRESS NOTES
Progress Note  Date:2021       [facility], later [facility]:[id number]  Patient Name:Shane Gloria     YOB: 1985     Age:35 y.o. Has no headache  Subjective    Subjective:  Symptoms:  Stable. Pain:  He reports no pain. Review of Systems  Objective         Vitals Last 24 Hours:  TEMPERATURE:  Temp  Av.5 °F (36.9 °C)  Min: 98.4 °F (36.9 °C)  Max: 98.6 °F (37 °C)  RESPIRATIONS RANGE: Resp  Av.2  Min: 15  Max: 25  PULSE OXIMETRY RANGE: SpO2  Av.4 %  Min: 97 %  Max: 99 %  PULSE RANGE: Pulse  Av.6  Min: 70  Max: 99  BLOOD PRESSURE RANGE: Systolic (58ZYQ), UWX:93 , Min:73 , RBD:375   ; Diastolic (31HYE), DHV:69, Min:44, Max:83    I/O (24Hr): No intake or output data in the 24 hours ending 21 0948  Objective:  General Appearance:  Comfortable. Vital signs: (most recent): Blood pressure 105/62, pulse 81, temperature 97.9 °F (36.6 °C), temperature source Oral, resp. rate 21, height 5' 10\" (1.778 m), weight 189 lb (85.7 kg), SpO2 99 %. Vital signs are normal.    HEENT: Normal HEENT exam.    Lungs:  Normal effort. Heart: Normal rate. Abdomen: Abdomen is soft. Extremities: Decreased range of motion. Neurological: Patient is alert. Pupils:  Pupils are equal, round, and reactive to light. Skin:  Warm.       Labs/Imaging/Diagnostics    Labs:  CBC:Recent Labs     213 21  0756   WBC 11.1* 11.0* 10.1   RBC 6.01 6.22* 5.72   HGB 15.9 16.2 15.3   HCT 50.7 52.6* 48.6   MCV 84.4 84.6 85.0   RDW 14.1 14.3 14.5    271 230     CHEMISTRIES:  Recent Labs     21  0259   *  --    K 4.2  --    CL 99  --    CO2 25  --    BUN 12  --    CREATININE 1.3  --    GLUCOSE 103* 83     PT/INR:  Recent Labs     21  0756 21  0111   PROTIME 13.3 13.2 12.9   INR 1.03 1.02 1.00     APTT:  Recent Labs     21  0756 21  0111   APTT 35.0 37.8* 36.5     LIVER PROFILE:  Recent Labs 08/22/21  1756   AST 30   ALT 30   BILITOT 0.4   ALKPHOS 95       Imaging Last 24 Hours:  Echocardiogram limited    Result Date: 8/23/2021  Transthoracic Echocardiography Report (TTE)  Demographics   Patient Name       Saritha Jones Date of Study       08/23/2021   Date of Birth      1985         Gender              Male   Age                30 year(s)         Race                Other   Patient Number     [id number]         Room Number         FB95   Visit Number       [id number]   Corporate ID       [id number]   Accession Number   [id number]         Guicho Sy T   Ordering Physician Vilma Ferguson MD                 Physician           MD  Procedure Type of Study   TTE procedure:ECHOCARDIOGRAM LIMITED. Procedure Date Date: 08/23/2021 Start: 10:37 AM Study Location: Portable Technical Quality: Fair visualization Indications:Low Ejection Fraction. Patient Status: Routine Contrast Medium: Definity. Amount - 2 ml Height: 70 inches Weight: 189 pounds BSA: 2.04 m2 BMI: 27.12 kg/m2  Conclusions   Summary  Left ventricular systolic function is abnormal.  Ejection fraction is visually estimated at <20%. Apical, septal, and anterior wall segments are akinetic. Apical ballooning noted. No evidence of thrombus; however, slow flow noted near the apical inferior  wall segment. No evidence of any pericardial effusion. Signature   ------------------------------------------------------------------  Electronically signed by Joshua Kumar MD (Interpreting  physician) on 08/23/2021 at 11:52 AM  ------------------------------------------------------------------   Findings   Left Ventricle  Left ventricular systolic function is abnormal.  Ejection fraction is visually estimated at <20%. Apical, septal, and anterior wall segments are akinetic.   Apical ballooning noted. No evidence of thrombus. Slow flow noted near the apical inferior wall  segment. Pericardial Effusion  No evidence of any pericardial effusion. Pleural Effusion  No evidence of pleural effusion. MRI BRAIN WO CONTRAST    Result Date: 8/23/2021  EXAMINATION: MRI OF THE BRAIN WITHOUT CONTRAST  8/23/2021 9:55 am TECHNIQUE: Multiplanar multisequence MRI of the brain was performed without the administration of intravenous contrast. COMPARISON: CT head 08/22/2021 HISTORY: ORDERING SYSTEM PROVIDED HISTORY: Acute CVA,  chronic calcified stage is neurocysticercosis? TECHNOLOGIST PROVIDED HISTORY: Reason for exam:->Acute CVA,  chronic calcified stage is neurocysticercosis? Reason for Exam: evaluate for CVA, extremity numbness Acuity: Acute Type of Exam: Initial FINDINGS: INTRACRANIAL STRUCTURES/VENTRICLES: Small acute punctate infarct is identified within the left insular cortex within the frontal lobe. no mass effect or midline shift. No evidence of an acute intracranial hemorrhage. The ventricles and sulci are normal in size and configuration. The sellar/suprasellar regions appear unremarkable. The normal signal voids within the major intracranial vessels appear maintained. ORBITS: Hypertense signal identified within the right globe which could be related to prior orbital hemorrhage silicone injection. SINUSES: Mild mucosal thickening is identified within the paranasal sinuses. BONES/SOFT TISSUES: The bone marrow signal intensity appears normal. The soft tissues demonstrate no acute abnormality. Small acute punctate infarct within the left frontal lobe involving the insular cortex. High signal intensity within the right globe could be related to vitreous hemorrhage versus surgical intervention.      NM MYOCARD INFARCT SPECT SCAN    Result Date: 8/24/2021  Cardiac Perfusion Imaging   Demographics   Patient Name      Thomas Solomon Date of study        08/24/2021   Date of Birth 1985         Gender               Male   Age               30 year(s)         Race                 Other   Patient Number    [id number]         Room Number          2128   Visit Number      [id number]          Height               70 inches   Corporate ID      [id number]           Weight               189 pounds   Accession Number  [id number]                                        NM Technologist      Adelita Courtney, [address] [place] [address], Sayed Max Mcclure  Physician         MD                 Cardiologist         MD   Conclusions   Summary  Apical infarct with no perfusion in delayed images  Anterior , septal , inferior and lateral walls have perfusion in delayed  images   Signatures   ------------------------------------------------------------------  Electronically signed by Ana Maria yAoub MD (Interpreting  cardiologist) on 08/24/2021 at 18:09  ------------------------------------------------------------------  Procedure Admit Source:Emergency department. Procedure Type:   Viability study:RESTING THALL VIABIL W/REINJ MED  Indications: To evaluate viability. Risk Factors   The patient risk factors include:cerebrovascular disease, Current - Every  day tobacco use, treated hypertension, last creatinine: 1.3 mg/dl and  creatinine clearance: 97.09 ml/min. Viability Imaging Protocols   Isotope: Thallium 201       Scan times  Isotope dose:3 mCi          1st: Rest  Administration route: I.V.  2nd: Rest  Date: 08/24/2021 11:30  Isotope Reinjection  Isotope dose:1.1 mCi  Administration route: I.V. Date: 08/24/2021 14:45   Technique:      Rest reinjection                  SPECT  Medical History   Accession#:  [id number]  Admission Data Admission date: 08/22/2021 Admission Time: 16:25 Insurance payors: Private health insurance. Hospital Status: Inpatient.     Assessment//Plan           Hospital Problems         Last Modified POA    NSTEMI (non-ST elevated myocardial infarction) ([facility] [address] 8/22/2021 Yes    Cerebrovascular accident (CVA) due to embolism ([facility] [address] 8/22/2021 Yes    Aphasia 8/22/2021 Yes    TIA (transient ischemic attack) 8/23/2021 Yes        Assessment & Plan  NSTEMI with cute systolic CHF  -S/p LHC  prox LAD occlusion with collateral right filling LAD  -EF 20% with no thormbus  -viability  Study today  -BB, ASa, ACE, aldactone  ACute puntate left frontal CVA  -ASa, statin, coumadin  CTA head and neg neg  neurocystocosis  -ID  -was going to start on albendazole upon discussion with pt and may need praziquantal for more then 2-3 lesion l but looked at recommendations and recommended to get ID consultation prior to starting treatment so will hold antiparasitic for now until ID  -no steroids and as no seizures, encephaloapthy    Repeat MRI brain pending.  As per recommendations will wait for ID recs on starting albendazole or albendazole/praziquantal for neurocysticosis      Electronically signed by Leonard Munoz MD on 8/25/21 at 9:48 AM EDT

## 2021-08-26 LAB
APTT: 34.7 SECONDS (ref 25.1–37.1)
APTT: 34.9 SECONDS (ref 25.1–37.1)
APTT: 35.2 SECONDS (ref 25.1–37.1)
HCT VFR BLD CALC: 51.2 % (ref 42–52)
HEMOGLOBIN: 16.2 GM/DL (ref 13.5–18)
INR BLD: 1.07 INDEX
MCH RBC QN AUTO: 26.6 PG (ref 27–31)
MCHC RBC AUTO-ENTMCNC: 31.6 % (ref 32–36)
MCV RBC AUTO: 83.9 FL (ref 78–100)
PDW BLD-RTO: 14.1 % (ref 11.7–14.9)
PLATELET # BLD: 279 K/CU MM (ref 140–440)
PMV BLD AUTO: 10.1 FL (ref 7.5–11.1)
PROTHROMBIN TIME: 13.8 SECONDS (ref 11.7–14.5)
RBC # BLD: 6.1 M/CU MM (ref 4.6–6.2)
RPR: NON REACTIVE
WBC # BLD: 9.9 K/CU MM (ref 4–10.5)

## 2021-08-26 PROCEDURE — 2580000003 HC RX 258: Performed by: INTERNAL MEDICINE

## 2021-08-26 PROCEDURE — 6370000000 HC RX 637 (ALT 250 FOR IP): Performed by: INTERNAL MEDICINE

## 2021-08-26 PROCEDURE — 85730 THROMBOPLASTIN TIME PARTIAL: CPT

## 2021-08-26 PROCEDURE — 94761 N-INVAS EAR/PLS OXIMETRY MLT: CPT

## 2021-08-26 PROCEDURE — 99232 SBSQ HOSP IP/OBS MODERATE 35: CPT | Performed by: INTERNAL MEDICINE

## 2021-08-26 PROCEDURE — 2140000000 HC CCU INTERMEDIATE R&B

## 2021-08-26 PROCEDURE — 6370000000 HC RX 637 (ALT 250 FOR IP): Performed by: PSYCHIATRY & NEUROLOGY

## 2021-08-26 PROCEDURE — 85027 COMPLETE CBC AUTOMATED: CPT

## 2021-08-26 PROCEDURE — 36415 COLL VENOUS BLD VENIPUNCTURE: CPT

## 2021-08-26 PROCEDURE — 85610 PROTHROMBIN TIME: CPT

## 2021-08-26 RX ORDER — LISINOPRIL 5 MG/1
5 TABLET ORAL DAILY
Qty: 30 TABLET | Refills: 0 | Status: SHIPPED | OUTPATIENT
Start: 2021-08-27 | End: 2021-08-31

## 2021-08-26 RX ORDER — ATORVASTATIN CALCIUM 80 MG/1
80 TABLET, FILM COATED ORAL NIGHTLY
Qty: 30 TABLET | Refills: 0 | Status: SHIPPED | OUTPATIENT
Start: 2021-08-26 | End: 2021-09-21 | Stop reason: SDUPTHER

## 2021-08-26 RX ORDER — VALACYCLOVIR HYDROCHLORIDE 500 MG/1
500 TABLET, FILM COATED ORAL NIGHTLY
Qty: 30 TABLET | Refills: 0 | Status: SHIPPED | OUTPATIENT
Start: 2021-08-26 | End: 2021-09-21 | Stop reason: SDUPTHER

## 2021-08-26 RX ORDER — WARFARIN SODIUM 2 MG/1
4 TABLET ORAL DAILY
Status: DISCONTINUED | OUTPATIENT
Start: 2021-08-26 | End: 2021-08-27 | Stop reason: HOSPADM

## 2021-08-26 RX ORDER — METOPROLOL SUCCINATE 25 MG/1
25 TABLET, EXTENDED RELEASE ORAL DAILY
Qty: 30 TABLET | Refills: 0 | Status: SHIPPED | OUTPATIENT
Start: 2021-08-27 | End: 2021-08-31 | Stop reason: SDUPTHER

## 2021-08-26 RX ORDER — ASPIRIN 81 MG/1
81 TABLET, CHEWABLE ORAL DAILY
Qty: 30 TABLET | Refills: 0 | Status: SHIPPED | OUTPATIENT
Start: 2021-08-27 | End: 2021-09-21 | Stop reason: SDUPTHER

## 2021-08-26 RX ORDER — SPIRONOLACTONE 25 MG/1
25 TABLET ORAL DAILY
Qty: 30 TABLET | Refills: 0 | Status: SHIPPED | OUTPATIENT
Start: 2021-08-27 | End: 2021-09-21 | Stop reason: SDUPTHER

## 2021-08-26 RX ORDER — B12/LEVOMEFOLATE CALCIUM/B-6 2-1.13-25
1 TABLET ORAL DAILY
Qty: 30 TABLET | Refills: 0 | Status: SHIPPED | OUTPATIENT
Start: 2021-08-27 | End: 2021-09-21 | Stop reason: SDUPTHER

## 2021-08-26 RX ADMIN — SODIUM CHLORIDE, PRESERVATIVE FREE 10 ML: 5 INJECTION INTRAVENOUS at 11:10

## 2021-08-26 RX ADMIN — SODIUM CHLORIDE, PRESERVATIVE FREE 10 ML: 5 INJECTION INTRAVENOUS at 21:32

## 2021-08-26 RX ADMIN — METOPROLOL SUCCINATE 25 MG: 25 TABLET, EXTENDED RELEASE ORAL at 11:10

## 2021-08-26 RX ADMIN — ATORVASTATIN CALCIUM 80 MG: 20 TABLET, FILM COATED ORAL at 21:29

## 2021-08-26 RX ADMIN — Medication 1 TABLET: at 11:11

## 2021-08-26 RX ADMIN — ASPIRIN 81 MG: 81 TABLET, CHEWABLE ORAL at 11:10

## 2021-08-26 RX ADMIN — VALACYCLOVIR HYDROCHLORIDE 500 MG: 500 TABLET, FILM COATED ORAL at 11:10

## 2021-08-26 RX ADMIN — LISINOPRIL 5 MG: 5 TABLET ORAL at 11:10

## 2021-08-26 RX ADMIN — SPIRONOLACTONE 25 MG: 25 TABLET ORAL at 11:11

## 2021-08-26 RX ADMIN — WARFARIN SODIUM 4 MG: 2 TABLET ORAL at 18:20

## 2021-08-26 ASSESSMENT — PAIN SCALES - GENERAL
PAINLEVEL_OUTOF10: 0

## 2021-08-26 NOTE — PROGRESS NOTES
Progress Note  Date:2021       TYES:[id number]  Patient Name:Shane White     YOB: 1985     Age:35 y.o. Has no headache and feels better  Subjective    Subjective:  Symptoms:  Stable. Diet:  Adequate intake. Pain:  He reports no pain. Review of Systems  Objective         Vitals Last 24 Hours:  TEMPERATURE:  Temp  Av.9 °F (36.6 °C)  Min: 97.6 °F (36.4 °C)  Max: 98.4 °F (36.9 °C)  RESPIRATIONS RANGE: Resp  Av.7  Min: 16  Max: 22  PULSE OXIMETRY RANGE: SpO2  Av.1 %  Min: 96 %  Max: 100 %  PULSE RANGE: Pulse  Av.6  Min: 66  Max: 95  BLOOD PRESSURE RANGE: Systolic (29LOI), NEK:191 , Min:95 , DKW:676   ; Diastolic (73WPT), DIT:57, Min:61, Max:78    I/O (24Hr): Intake/Output Summary (Last 24 hours) at 2021 0806  Last data filed at 2021 1029  Gross per 24 hour   Intake --   Output 400 ml   Net -400 ml     Objective:  General Appearance:  Comfortable. Vital signs: (most recent): Blood pressure 95/63, pulse 71, temperature 98.2 °F (36.8 °C), temperature source Oral, resp. rate 18, height 5' 10\" (1.778 m), weight 189 lb (85.7 kg), SpO2 100 %. Vital signs are normal.    HEENT: Normal HEENT exam.    Lungs:  Normal effort. Heart: Normal rate. Abdomen: Abdomen is soft. Extremities: Decreased range of motion. Neurological: Patient is alert. Pupils:  Pupils are equal, round, and reactive to light. Skin:  Warm. Labs/Imaging/Diagnostics    Labs:  CBC:  Recent Labs     21  0756 21  0216   WBC 10.1 9.9   RBC 5.72 6.10   HGB 15.3 16.2   HCT 48.6 51.2   MCV 85.0 83.9   RDW 14.5 14.1    279     CHEMISTRIES:  No results for input(s): NA, K, CL, CO2, BUN, CREATININE, GLUCOSE, PHOS, MG in the last 72 hours.     Invalid input(s): CA  PT/INR:  Recent Labs     21  0756 21  0111 21  0216   PROTIME 13.2 12.9 13.8   INR 1.02 1.00 1.07     APTT:  Recent Labs     21  0756 21  0111 21  0216   APTT 37.8* 36.5 34.7     LIVER PROFILE:  No results for input(s): AST, ALT, BILIDIR, BILITOT, ALKPHOS in the last 72 hours. Imaging Last 24 Hours:  Echocardiogram limited    Result Date: 8/23/2021  Transthoracic Echocardiography Report (TTE)  Demographics   Patient Name       Derick Ramírez Date of Study       08/23/2021   Date of Birth      1985         Gender              Male   Age                30 year(s)         Race                Other   Patient Number     [id number]         Room Number         IV42   Visit Number       [id number]   Corporate ID       [id number]   Accession Number   [id number]         Cinda Gomez RVT   Ordering Physician Parul Ferguson MD                 Physician           MD  Procedure Type of Study   TTE procedure:ECHOCARDIOGRAM LIMITED. Procedure Date Date: 08/23/2021 Start: 10:37 AM Study Location: Portable Technical Quality: Fair visualization Indications:Low Ejection Fraction. Patient Status: Routine Contrast Medium: Definity. Amount - 2 ml Height: 70 inches Weight: 189 pounds BSA: 2.04 m2 BMI: 27.12 kg/m2  Conclusions   Summary  Left ventricular systolic function is abnormal.  Ejection fraction is visually estimated at <20%. Apical, septal, and anterior wall segments are akinetic. Apical ballooning noted. No evidence of thrombus; however, slow flow noted near the apical inferior  wall segment. No evidence of any pericardial effusion. Signature   ------------------------------------------------------------------  Electronically signed by Natacha Howard MD (Interpreting  physician) on 08/23/2021 at 11:52 AM  ------------------------------------------------------------------   Findings   Left Ventricle  Left ventricular systolic function is abnormal.  Ejection fraction is visually estimated at <20%.   Apical, septal, and anterior wall segments are akinetic. Apical ballooning noted. No evidence of thrombus. Slow flow noted near the apical inferior wall  segment. Pericardial Effusion  No evidence of any pericardial effusion. Pleural Effusion  No evidence of pleural effusion. MRI BRAIN WO CONTRAST    Result Date: 8/23/2021  EXAMINATION: MRI OF THE BRAIN WITHOUT CONTRAST  8/23/2021 9:55 am TECHNIQUE: Multiplanar multisequence MRI of the brain was performed without the administration of intravenous contrast. COMPARISON: CT head 08/22/2021 HISTORY: ORDERING SYSTEM PROVIDED HISTORY: Acute CVA,  chronic calcified stage is neurocysticercosis? TECHNOLOGIST PROVIDED HISTORY: Reason for exam:->Acute CVA,  chronic calcified stage is neurocysticercosis? Reason for Exam: evaluate for CVA, extremity numbness Acuity: Acute Type of Exam: Initial FINDINGS: INTRACRANIAL STRUCTURES/VENTRICLES: Small acute punctate infarct is identified within the left insular cortex within the frontal lobe. no mass effect or midline shift. No evidence of an acute intracranial hemorrhage. The ventricles and sulci are normal in size and configuration. The sellar/suprasellar regions appear unremarkable. The normal signal voids within the major intracranial vessels appear maintained. ORBITS: Hypertense signal identified within the right globe which could be related to prior orbital hemorrhage silicone injection. SINUSES: Mild mucosal thickening is identified within the paranasal sinuses. BONES/SOFT TISSUES: The bone marrow signal intensity appears normal. The soft tissues demonstrate no acute abnormality. Small acute punctate infarct within the left frontal lobe involving the insular cortex. High signal intensity within the right globe could be related to vitreous hemorrhage versus surgical intervention.      NM MYOCARD INFARCT SPECT SCAN    Result Date: 8/24/2021  Cardiac Perfusion Imaging   Demographics   Patient Name      Miko Chiann Date of study        08/24/2021   Date of Birth     1985         Gender               Male   Age               30 year(s)         Race                 Other   Patient Number    [id number]         Room Number          2128   Visit Number      [id number]          Height               70 inches   Corporate ID      [id number]           Weight               189 pounds   Accession Number  [id number]                                        NM Technologist      Sid Anders, [address] [facility], Sayed Radha Hoyos  Physician         MD                 Cardiologist         MD   Conclusions   Summary  Apical infarct with no perfusion in delayed images  Anterior , septal , inferior and lateral walls have perfusion in delayed  images   Signatures   ------------------------------------------------------------------  Electronically signed by Maria Ines Pruitt MD (Interpreting  cardiologist) on 08/24/2021 at 18:09  ------------------------------------------------------------------  Procedure Admit Source:Emergency department. Procedure Type:   Viability study:RESTING THALL VIABIL W/REINJ MED  Indications: To evaluate viability. Risk Factors   The patient risk factors include:cerebrovascular disease, Current - Every  day tobacco use, treated hypertension, last creatinine: 1.3 mg/dl and  creatinine clearance: 97.09 ml/min. Viability Imaging Protocols   Isotope: Thallium 201       Scan times  Isotope dose:3 mCi          1st: Rest  Administration route: I.V.  2nd: Rest  Date: 08/24/2021 11:30  Isotope Reinjection  Isotope dose:1.1 mCi  Administration route: I.V. Date: 08/24/2021 14:45   Technique:      Rest reinjection                  SPECT  Medical History   Accession#:  [id number]  Admission Data Admission date: 08/22/2021 Admission Time: 16:25 Insurance payors: Private health insurance. Hospital Status: Inpatient.     Assessment//Plan           Hospital Problems         Last Modified POA NSTEMI (non-ST elevated myocardial infarction) ([facility] [address] 8/22/2021 Yes    Cerebrovascular accident (CVA) due to embolism ([facility] [address] 8/22/2021 Yes    Aphasia 8/22/2021 Yes    TIA (transient ischemic attack) 8/23/2021 Yes        Assessment & Plan  NSTEMI with cute systolic CHF  -S/p LHC  prox LAD occlusion with collateral right filling LAD  -EF 20% with no thormbus  -viability  Study with no perfusion apical but ant wall is viable and outpt f/u and will need life vest   -BB, ASa, ACE, aldactone  ACute puntate left frontal CVA  -ASa, statin, coumadin  CTA head and neg neg  neurocystocosis? -ID  -repeat MRI brain neg with contrast and discussed with radiologist and no active lesions suggestive of this so will hold treatment   Await ID recs      Hopefully dc 24hrs and will discuss with neuro and ID.  Also cardiac outpt f/u and then will need life vest. INR is still low but would be able to expect discharge if INR trends up and then can follow up outpt      Electronically signed by Deirdre Tabor MD on 8/25/21 at 9:48 AM EDT

## 2021-08-26 NOTE — PROGRESS NOTES
PHARMACY ANTICOAGULATION MONITORING SERVICE    Jennifer Salas is a 30 y.o. male on warfarin therapy for acute CVA. Pharmacy consulted by Dr. Maria Singh for monitoring and adjustment of treatment. Indication for anticoagulation: s/p acute CVA, likely embolic from NSTEMI/severe cardiomyopathy  - cardiology notes small infarct with low risk hemorrhagic conversion  INR goal: 2-3  Warfarin dose prior to admission: N/A - new start    Pertinent Laboratory Values   Recent Labs     08/24/21  0756 08/25/21  0111 08/26/21  0216   INR 1.02   < > 1.07   HGB 15.3  --  16.2   HCT 48.6  --  51.2     --  279    < > = values in this interval not displayed.        Assessment/Plan:   Possible DDI:   o Aspirin 81 mg daily  o Heparin bridge now stopped per Dr. Piper Just INR = 1.07, remains sub-therapeutic following 3 days of warfarin   Adjust (increase) warfarin dose to 4 mg daily  o Continue cautious dose titration with recent CVA   Pharmacy will continue to monitor and adjust warfarin therapy as indicated    Thank you for the consult,  Josh Reilly [id number] [facility], PharmD  8/26/2021 2:13 PM

## 2021-08-26 NOTE — PROGRESS NOTES
NEUROLOGY NOTE  DR. Constance Bess MD.  -------------------------------------------------  Subjective:    Doing better. Denies any new symptoms. Denies headache nausea vomiting dizziness    Denies numbness weakness extremities    Denies blurring of vision double vision    Objective:    /78   Pulse 90   Temp 97.6 °F (36.4 °C) (Oral)   Resp 20   Ht 5' 10\" (1.778 m)   Wt 189 lb (85.7 kg)   SpO2 99%   BMI 27.12 kg/m²   HEENT nl      Neuro exam    Alert Oriented  X 3  Follow simple commands  EOMI Pupils 3 mm maura  5/5 all 4 extremities      RADIOLOGY  -----------------    ECHO Complete 2D W Doppler W Color    Result Date: 8/23/2021  Transthoracic Echocardiography Report (TTE)  Demographics   Patient Name       Chi Soriano Date of Study       08/23/2021   Date of Birth      1985         Gender              Male   Age                30 year(s)         Race                Other   Patient Number     [id number]         Room Number         OH44   Visit Number       [id number]   Corporate ID       [id number]   Accession Number   [id number]         Barrington Cox RDMS   Ordering Physician Teresa Traylor MD                 Physician           MD  Procedure Type of Study   TTE procedure:ECHOCARDIOGRAM COMPLETE 2D W DOPPLER W COLOR. Procedure Date Date: 08/23/2021 Start: 07:46 AM Study Location: Portable Technical Quality: Good visualization Indications:NSTEMI. Patient Status: Routine Height: 70 inches Weight: 189 pounds BSA: 2.04 m2 BMI: 27.12 kg/m2 HR: 84 bpm BP: 106/76 mmHg  Conclusions   Summary  Left ventricular systolic function is abnormal.  Ejection fraction is visually estimated at <20%. Apical, septal, and anterior wall segments are akinetic. Apical ballooning noted. Severly dilated left ventricle measuring 7.0cm in diastole.   No evidence of any pericardial effusion. Repeat study with Definity to rule out LV thrombus   Signature   ------------------------------------------------------------------  Electronically signed by Rivera Uriarte MD (Interpreting  physician) on 08/23/2021 at 09:37 AM  ------------------------------------------------------------------   Findings   Left Ventricle  Left ventricular systolic function is abnormal.  Ejection fraction is visually estimated at <20%. Apical, septal, and anterior wall segments are akinetic. Apical ballooning noted. Severly dilated left ventricle measuring 7.0cm in diastole. Indeterminate diastolic function; E/A flow reversal is noted. Left Atrium  Essentially normal left atrium. Right Atrium  Essentially normal right atrium. Right Ventricle  Essentially normal right ventricle. Aortic Valve  Structurally normal aortic valve. Mitral Valve  Mild mitral regurgitation. Tricuspid Valve  Mild tricuspid regurgitation; RVSP: 30 mmHg. Pulmonic Valve  The pulmonic valve was not well visualized. Pericardial Effusion  No evidence of any pericardial effusion. Pleural Effusion  No evidence of pleural effusion. Miscellaneous  The aorta is within normal limits.   M-Mode/2D Measurements & Calculations   LV Diastolic Dimension:  LV Systolic Dimension:  LA Dimension: 3.9 cmAO Root  6.98 cm                  6.25 cm                 Dimension: 2.9 cmLA Area:  LV FS:10.5 %             LV Volume Diastolic:    63.9 cm2  LV PW Diastolic: 8.51 cm 050 ml  LV PW Systolic: 1.90 cm  LV Volume Systolic: 90  Septum Diastolic: 9.21   ml  cm                       LV EDV/LV EDV Index:    RV Diastolic Dimension:  Septum Systolic: 5.81 cm 037 JOANNA/83 m2LV ESV/LV   2.52 cm  CO: 4.25 l/min           ESV Index: 90 ml/44 m2  CI: 2.08 l/m*m2          EF Calculated (A4C):    LA/Aorta: 1.34                           37.9 %  LV Area Diastolic: 63.9  EF Calculated (2D): 22  LA volume/Index: 38 ml  cm2                      % /26H3  LV Area Systolic: 29 cm2                           LV Length: 7.74 cm                            LVOT: 2.1 cm  Doppler Measurements & Calculations   MV Peak E-Wave: 60.2 AV Peak Velocity: 91.9 cm/s   LVOT Peak Velocity: 72.2  cm/s                 AV Peak Gradient: 3.38 mmHg   cm/s  MV Peak A-Wave: 80.5 AV Mean Velocity: 72.2 cm/s   LVOT Mean Velocity: 46.8  cm/s                 AV Mean Gradient: 2 mmHg      cm/s  MV E/A Ratio: 0.75   AV VTI: 20.8 cm               LVOT Peak Gradient: 2  MV Peak Gradient:    AV Area (Continuity):2.43 cm2 mmHgLVOT Mean Gradient: 1  1.45 mmHg                                          mmHg                       LVOT VTI: 14.6 cm             Estimated RVSP: 30 mmHg  MV P1/2t: 28 msec                                  Estimated RAP:3 mmHg  MVA by PHT:7.86 cm2  Estimated PASP: 14.42 mmHg   MV E' Septal                                       TR Velocity:169 cm/s  Velocity: 6.14 cm/s                                TR Gradient:11.42 mmHg  MV E' Lateral  Velocity: 4.61 cm/s  MV E/E' septal: 9.8  MV E/E' lateral:  13.06      Echocardiogram limited    Result Date: 8/23/2021  Transthoracic Echocardiography Report (TTE)  Demographics   Patient Name       Qamar Lovelace Date of Study       08/23/2021   Date of Birth      1985         Gender              Male   Age                30 year(s)         Race                Other   Patient Number     [id number]         Room Number         SB00   Visit Number       [id number]   Corporate ID       [id number]   Accession Number   [id number]         Mirian Bourne RVT   Ordering Physician Romana Ferguson MD                 Physician           MD  Procedure Type of Study   TTE procedure:ECHOCARDIOGRAM LIMITED.   Procedure Date Date: 08/23/2021 Start: 10:37 AM Study Location: Portable Technical Quality: Fair visualization Indications:Low Ejection Fraction. Patient Status: Routine Contrast Medium: Definity. Amount - 2 ml Height: 70 inches Weight: 189 pounds BSA: 2.04 m2 BMI: 27.12 kg/m2  Conclusions   Summary  Left ventricular systolic function is abnormal.  Ejection fraction is visually estimated at <20%. Apical, septal, and anterior wall segments are akinetic. Apical ballooning noted. No evidence of thrombus; however, slow flow noted near the apical inferior  wall segment. No evidence of any pericardial effusion. Signature   ------------------------------------------------------------------  Electronically signed by Jaswinder Moe MD (Interpreting  physician) on 08/23/2021 at 11:52 AM  ------------------------------------------------------------------   Findings   Left Ventricle  Left ventricular systolic function is abnormal.  Ejection fraction is visually estimated at <20%. Apical, septal, and anterior wall segments are akinetic. Apical ballooning noted. No evidence of thrombus. Slow flow noted near the apical inferior wall  segment. Pericardial Effusion  No evidence of any pericardial effusion. Pleural Effusion  No evidence of pleural effusion. CT HEAD WO CONTRAST    Result Date: 8/22/2021  EXAMINATION: CT OF THE HEAD WITHOUT CONTRAST  8/22/2021 5:19 pm TECHNIQUE: CT of the head was performed without the administration of intravenous contrast. Dose modulation, iterative reconstruction, and/or weight based adjustment of the mA/kV was utilized to reduce the radiation dose to as low as reasonably achievable. COMPARISON: None. HISTORY: ORDERING SYSTEM PROVIDED HISTORY: dysphasia TECHNOLOGIST PROVIDED HISTORY: Has a \"code stroke\" or \"stroke alert\" been called? ->Yes Reason for exam:->dysphasia Decision Support Exception - unselect if not a suspected or confirmed emergency medical condition->Emergency Medical Condition (MA) Reason for Exam: dysphasia Acuity: Acute Type of Exam: Initial FINDINGS: BRAIN/VENTRICLES: There is no acute intracranial hemorrhage, mass effect or midline shift. No abnormal extra-axial fluid collection. The gray-white differentiation is maintained without evidence of an acute infarct. There is no evidence of hydrocephalus. There are subtle areas of coarse intraparenchymal calcification within the left superior frontal gyrus subcortical white matter and right inferior frontal gyrus subcortical white matter ORBITS: The lens within the right globe is not visualized and there is hyperdensity filling the vitreous of the right globe. The visualized portion of the orbits demonstrate no acute abnormality. SINUSES: Mild mucosal thickening of the ethmoidal air cells. The visualized paranasal sinuses and mastoid air cells demonstrate no acute abnormality. SOFT TISSUES/SKULL:  No acute abnormality of the visualized skull or soft tissues. No acute intracranial abnormality. No acute territorial infarction, intracranial hemorrhage or mass lesion. There are subtle areas of coarse intraparenchymal calcification within the left superior frontal gyrus subcortical white matter and right inferior frontal gyrus subcortical white matter. Findings are nonspecific but may be suggestive chronic calcified stage is neurocysticercosis in correct clinical setting. The lens within the right globe is not visualized and there is hyperdensity filling the vitreous of the right globe. The finding is likely related to prior surgical intervention for retinal detachment. Clinical correlation is recommended. Critical results were called by Dr. Bob Kline MD to Dr. Natasha Patiño on 8/22/2021 at 17:31. XR CHEST PORTABLE    Result Date: 8/22/2021  EXAMINATION: ONE XRAY VIEW OF THE CHEST 8/22/2021 6:02 pm COMPARISON: None.  HISTORY: ORDERING SYSTEM PROVIDED HISTORY: dysphasia TECHNOLOGIST PROVIDED HISTORY: Reason for exam:->dysphasia Reason for Exam: stroke protocol, dysphasia, numbness Acuity: Acute Type of Exam: Initial FINDINGS: The lungs are without acute focal process. There is no effusion or pneumothorax. The cardiomediastinal silhouette is without acute process. The osseous structures are without acute process. No acute process. CTA NECK W CONTRAST    Result Date: 8/22/2021  EXAMINATION: CTA OF THE NECK; CTA OF THE HEAD WITH CONTRAST 8/22/2021 5:43 pm; 8/22/2021 5:40 pm: TECHNIQUE: CTA of the neck was performed with the administration of intravenous contrast. Multiplanar reformatted images are provided for review. MIP images are provided for review. Stenosis of the internal carotid arteries measured using NASCET criteria. Dose modulation, iterative reconstruction, and/or weight based adjustment of the mA/kV was utilized to reduce the radiation dose to as low as reasonably achievable.; CTA of the head/brain was performed with the administration of intravenous contrast. Multiplanar reformatted images are provided for review. MIP images are provided for review. Dose modulation, iterative reconstruction, and/or weight based adjustment of the mA/kV was utilized to reduce the radiation dose to as low as reasonably achievable. COMPARISON: None. HISTORY: ORDERING SYSTEM PROVIDED HISTORY: dysphasia TECHNOLOGIST PROVIDED HISTORY: Has a \"code stroke\" or \"stroke alert\" been called? ->Yes Reason for exam:->dysphasia Decision Support Exception - unselect if not a suspected or confirmed emergency medical condition->Emergency Medical Condition (MA) Reason for Exam: dysphasia Acuity: Acute Type of Exam: Initial FINDINGS: CTA NECK: AORTIC ARCH/ARCH VESSELS: No dissection or arterial injury. No significant stenosis of the brachiocephalic or subclavian arteries. CAROTID ARTERIES: No dissection, arterial injury, or hemodynamically significant stenosis by NASCET criteria. VERTEBRAL ARTERIES: No dissection, arterial injury, or significant stenosis. SOFT TISSUES: The lung apices are clear.   No cervical or superior mediastinal lymphadenopathy. The larynx and pharynx are unremarkable. No acute abnormality of the salivary and thyroid glands. BONES: No acute osseous abnormality. CTA HEAD: ANTERIOR CIRCULATION: No significant stenosis of the intracranial internal carotid, anterior cerebral, or middle cerebral arteries. No aneurysm. Right SHUN A1 segment is hypoplastic. POSTERIOR CIRCULATION: No significant stenosis of the vertebral, basilar, or posterior cerebral arteries. No aneurysm. OTHER: No dural venous sinus thrombosis on this non-dedicated study. BRAIN: No mass effect or midline shift. No extra-axial fluid collection. The gray-white differentiation is maintained. Unremarkable CTA of the head and neck. CTA HEAD W CONTRAST    Result Date: 8/22/2021  EXAMINATION: CTA OF THE NECK; CTA OF THE HEAD WITH CONTRAST 8/22/2021 5:43 pm; 8/22/2021 5:40 pm: TECHNIQUE: CTA of the neck was performed with the administration of intravenous contrast. Multiplanar reformatted images are provided for review. MIP images are provided for review. Stenosis of the internal carotid arteries measured using NASCET criteria. Dose modulation, iterative reconstruction, and/or weight based adjustment of the mA/kV was utilized to reduce the radiation dose to as low as reasonably achievable.; CTA of the head/brain was performed with the administration of intravenous contrast. Multiplanar reformatted images are provided for review. MIP images are provided for review. Dose modulation, iterative reconstruction, and/or weight based adjustment of the mA/kV was utilized to reduce the radiation dose to as low as reasonably achievable. COMPARISON: None. HISTORY: ORDERING SYSTEM PROVIDED HISTORY: dysphasia TECHNOLOGIST PROVIDED HISTORY: Has a \"code stroke\" or \"stroke alert\" been called? ->Yes Reason for exam:->dysphasia Decision Support Exception - unselect if not a suspected or confirmed emergency medical condition->Emergency Medical Condition (MA) Reason for Exam: dysphasia Acuity: Acute Type of Exam: Initial FINDINGS: CTA NECK: AORTIC ARCH/ARCH VESSELS: No dissection or arterial injury. No significant stenosis of the brachiocephalic or subclavian arteries. CAROTID ARTERIES: No dissection, arterial injury, or hemodynamically significant stenosis by NASCET criteria. VERTEBRAL ARTERIES: No dissection, arterial injury, or significant stenosis. SOFT TISSUES: The lung apices are clear. No cervical or superior mediastinal lymphadenopathy. The larynx and pharynx are unremarkable. No acute abnormality of the salivary and thyroid glands. BONES: No acute osseous abnormality. CTA HEAD: ANTERIOR CIRCULATION: No significant stenosis of the intracranial internal carotid, anterior cerebral, or middle cerebral arteries. No aneurysm. Right SHUN A1 segment is hypoplastic. POSTERIOR CIRCULATION: No significant stenosis of the vertebral, basilar, or posterior cerebral arteries. No aneurysm. OTHER: No dural venous sinus thrombosis on this non-dedicated study. BRAIN: No mass effect or midline shift. No extra-axial fluid collection. The gray-white differentiation is maintained. Unremarkable CTA of the head and neck. MRI BRAIN WO CONTRAST    Result Date: 8/23/2021  EXAMINATION: MRI OF THE BRAIN WITHOUT CONTRAST  8/23/2021 9:55 am TECHNIQUE: Multiplanar multisequence MRI of the brain was performed without the administration of intravenous contrast. COMPARISON: CT head 08/22/2021 HISTORY: ORDERING SYSTEM PROVIDED HISTORY: Acute CVA,  chronic calcified stage is neurocysticercosis? TECHNOLOGIST PROVIDED HISTORY: Reason for exam:->Acute CVA,  chronic calcified stage is neurocysticercosis? Reason for Exam: evaluate for CVA, extremity numbness Acuity: Acute Type of Exam: Initial FINDINGS: INTRACRANIAL STRUCTURES/VENTRICLES: Small acute punctate infarct is identified within the left insular cortex within the frontal lobe. no mass effect or midline shift.  No evidence of an acute intracranial hemorrhage. The ventricles and sulci are normal in size and configuration. The sellar/suprasellar regions appear unremarkable. The normal signal voids within the major intracranial vessels appear maintained. ORBITS: Hypertense signal identified within the right globe which could be related to prior orbital hemorrhage silicone injection. SINUSES: Mild mucosal thickening is identified within the paranasal sinuses. BONES/SOFT TISSUES: The bone marrow signal intensity appears normal. The soft tissues demonstrate no acute abnormality. Small acute punctate infarct within the left frontal lobe involving the insular cortex. High signal intensity within the right globe could be related to vitreous hemorrhage versus surgical intervention.      NM MYOCARD INFARCT SPECT SCAN    Result Date: 8/24/2021  Cardiac Perfusion Imaging   Demographics   Patient Name      Diya Renteria Date of study        08/24/2021   Date of Birth     1985         Gender               Male   Age               30 year(s)         Race                 Other   Patient Number    [id number]         Room Number          2128   Visit Number      [id number]          Height               70 inches   Corporate ID      [id number]           Weight               189 pounds   Accession Number  [id number]                                        NM Technologist      Zara Carolina, [address] [place] [address], Sayed Cyndi Rosario MD                 Cardiologist         MD   Conclusions   Summary  Apical infarct with no perfusion in delayed images  Anterior , septal , inferior and lateral walls have perfusion in delayed  images   Signatures   ------------------------------------------------------------------  Electronically signed by Cecilia Partida MD (Interpreting  cardiologist) on 08/24/2021 at 18:09  ------------------------------------------------------------------  Procedure Admit Mercedes Euceda Neuro-radiologist on the phone after looking at the CT and Mri brain-discussed option of treating with anti-parasitic meds and discussed possibile complications of inducing inflammatory reaction in a calcific lesion that more likely has been there for some time and possibly chronic that is highly likely asymptomatic at [facility] time and patient chose not to be treated with anti-parasitic meds at this time. Will do serial CT brain every 6-12  Months to assess left frontal calcific lesion. 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     discussed dx prognosis meds side effects and above with pt and answered all questions.         Electronically signed by Mario Lindsey MD on 8/25/2021 at 8:07 PM

## 2021-08-26 NOTE — PROGRESS NOTES
Cardiology Progress Note     Admit Date:  8/22/2021    Consult reason/ Seen today for :   NSTEMI  CVA    Subjective and  Overnight Events : awaiting life vest  Echo shows reduced EF of 25-30 % . Cardiac cath showed LAD thrombus with 100% occluded LAD MRI confirms acute frontal stroke      Chief complain on admission : [address] y. o.year old who is admitted for  Chief Complaint   Patient presents with    Numbness     Jaw since 10 am      Assessment / Plan:  ASCVD: NSTEMI: Ischemic cardiomyopathy with anterior wall infarct septal Q waves. Apical infarct , ant wall is viable will follow up as outpatient , ok to discharge will get life vest   Cardiomyopathy: EF of 20% start Toprol-XL lisinopril  Acute CVA: Possible embolic stroke from LV due to severe cardiomyopathy, although Definity did not show thrombus, started Coumadin if okay later today due to small infarct(low risk for hemorrhagic conversion)  Neurocysticercosis as per  infectious disease evaluation  HTN: stable, continue To titrate up medication as needed  DVT Prophylaxis if no contraindication    Past medical history:    has no past medical history on file. Past surgical history:   has no past surgical history on file. Social History:   reports that he has been smoking. He started smoking 4 days ago. He has been smoking about 0.50 packs per day. He does not have any smokeless tobacco history on file. He reports that he does not use drugs. Family history:  family history is not on file.     No Known Allergies    Review of Systems:    All 14 systems were reviewed and are negative  Except for the positive findings  which as documented     /67   Pulse 96   Temp 98.3 °F (36.8 °C) (Oral)   Resp 26   Ht 5' 10\" (1.778 m)   Wt 189 lb (85.7 kg)   SpO2 100%   BMI 27.12 kg/m²     No intake or output data in the 24 hours ending 08/26/21 1444  Physical Exam:  Constitutional:  Well developed, Well nourished, No acute distress, Non-toxic appearance. HENT:  Normocephalic, Atraumatic, Bilateral external ears normal, Oropharynx moist, No oral exudates, Nose normal. Neck- Normal range of motion, No tenderness, Supple, No stridor. Eyes:  PERRL, EOMI, Conjunctiva normal, No discharge. Respiratory:  Normal breath sounds, No respiratory distress, No wheezing, No chest tenderness. Cardiovascular:  Normal heart rate, Normal rhythm, No murmurs, No rubs, No gallops, JVP not elevated  Abdomen/GI:  Bowel sounds normal, Soft, No tenderness, No masses, No pulsatile masses. Musculoskeletal:  Intact distal pulses, No edema, No tenderness, No cyanosis, No clubbing. Good range of motion in all major joints. No tenderness to palpation or major deformities noted. Back- No tenderness. Integument:  Warm, Dry, No erythema, No rash. Lymphatic:  No lymphadenopathy noted. Neurologic:  Alert & oriented x 3, Normal motor function, Normal sensory function, No focal deficits noted. Psychiatric:  Affect  and  Mood :no change    Medications:    warfarin  4 mg Oral Daily    spironolactone  25 mg Oral Daily    folic acid-pyridoxine-cyancobalamin  1 tablet Oral Daily    sodium chloride flush  5-40 mL IntraVENous 2 times per day    aspirin  81 mg Oral Daily    atorvastatin  80 mg Oral Nightly    metoprolol succinate  25 mg Oral Daily    lisinopril  5 mg Oral Daily    valACYclovir  500 mg Oral Nightly      sodium chloride      sodium chloride Stopped (08/23/21 0410)     sodium chloride flush, sodium chloride, ondansetron **OR** ondansetron, acetaminophen **OR** acetaminophen, polyethylene glycol    Lab Data:  CBC:   Recent Labs     08/24/21  0756 08/26/21  0216   WBC 10.1 9.9   HGB 15.3 16.2   HCT 48.6 51.2   MCV 85.0 83.9    279     BMP:   No results for input(s): NA, K, CL, CO2, PHOS, BUN, CREATININE in the last 72 hours.     Invalid input(s): CA  PT/INR:   Recent Labs     08/24/21  0756 08/25/21  0111 08/26/21  0216   PROTIME 13.2 12.9 13.8   INR 1.02 1.00 1.07     BNP:  No results for input(s): PROBNP in the last 72 hours. TROPONIN:   No results for input(s): TROPONINT in the last 72 hours. ECHO :   echocardiogram    Assessment:  30 y. o.year old who is admitted for  Chief Complaint   Patient presents with    Numbness     Jaw since 10 am    , active issues as noted below:  Impression:  Active Problems:    NSTEMI (non-ST elevated myocardial infarction) ([facility] [address]    Cerebrovascular accident (CVA) due to embolism ([facility] [address]    Aphasia    TIA (transient ischemic attack)  Resolved Problems:    * No resolved hospital problems. *            All labs, medications and tests reviewed by myself , continue all other medications of all above medical condition listed as is except for changes mentioned above. Thank you very much for consult , please call with questions.     Faby Harris MD, MD 8/26/2021 2:44 PM

## 2021-08-27 ENCOUNTER — TELEPHONE (OUTPATIENT)
Dept: CARDIOLOGY CLINIC | Age: 36
End: 2021-08-27

## 2021-08-27 ENCOUNTER — TELEPHONE (OUTPATIENT)
Dept: PHARMACY | Age: 36
End: 2021-08-27

## 2021-08-27 VITALS
BODY MASS INDEX: 27.06 KG/M2 | WEIGHT: 189 LBS | TEMPERATURE: 98.5 F | RESPIRATION RATE: 21 BRPM | SYSTOLIC BLOOD PRESSURE: 111 MMHG | OXYGEN SATURATION: 99 % | DIASTOLIC BLOOD PRESSURE: 80 MMHG | HEART RATE: 86 BPM | HEIGHT: 70 IN

## 2021-08-27 DIAGNOSIS — G45.9 TIA (TRANSIENT ISCHEMIC ATTACK): Primary | ICD-10-CM

## 2021-08-27 LAB
INR BLD: 1.12 INDEX
PROTHROMBIN TIME: 14.4 SECONDS (ref 11.7–14.5)

## 2021-08-27 PROCEDURE — 6370000000 HC RX 637 (ALT 250 FOR IP): Performed by: PSYCHIATRY & NEUROLOGY

## 2021-08-27 PROCEDURE — 85730 THROMBOPLASTIN TIME PARTIAL: CPT

## 2021-08-27 PROCEDURE — 6370000000 HC RX 637 (ALT 250 FOR IP): Performed by: INTERNAL MEDICINE

## 2021-08-27 PROCEDURE — 99232 SBSQ HOSP IP/OBS MODERATE 35: CPT | Performed by: INTERNAL MEDICINE

## 2021-08-27 PROCEDURE — 36415 COLL VENOUS BLD VENIPUNCTURE: CPT

## 2021-08-27 PROCEDURE — 85610 PROTHROMBIN TIME: CPT

## 2021-08-27 PROCEDURE — 2580000003 HC RX 258: Performed by: INTERNAL MEDICINE

## 2021-08-27 PROCEDURE — APPSS60 APP SPLIT SHARED TIME 46-60 MINUTES: Performed by: NURSE PRACTITIONER

## 2021-08-27 RX ORDER — WARFARIN SODIUM 2 MG/1
TABLET ORAL
Qty: 30 TABLET | Refills: 3 | Status: SHIPPED | OUTPATIENT
Start: 2021-08-27 | End: 2021-09-09 | Stop reason: SDUPTHER

## 2021-08-27 RX ADMIN — SODIUM CHLORIDE, PRESERVATIVE FREE 10 ML: 5 INJECTION INTRAVENOUS at 09:21

## 2021-08-27 RX ADMIN — METOPROLOL SUCCINATE 25 MG: 25 TABLET, EXTENDED RELEASE ORAL at 09:18

## 2021-08-27 RX ADMIN — LISINOPRIL 5 MG: 5 TABLET ORAL at 09:18

## 2021-08-27 RX ADMIN — SPIRONOLACTONE 25 MG: 25 TABLET ORAL at 09:18

## 2021-08-27 RX ADMIN — Medication 1 TABLET: at 09:18

## 2021-08-27 RX ADMIN — ASPIRIN 81 MG: 81 TABLET, CHEWABLE ORAL at 09:19

## 2021-08-27 ASSESSMENT — PAIN SCALES - GENERAL: PAINLEVEL_OUTOF10: 0

## 2021-08-27 NOTE — DISCHARGE SUMMARY
Printed on:08/27/21 5767   Medication Information                      aspirin 81 MG chewable tablet  Take 1 tablet by mouth daily             atorvastatin (LIPITOR) 80 MG tablet  Take 1 tablet by mouth nightly             folic acid-pyridoxine-cyancobalamin (FOLTX) 1.13-25-2 MG TABS  Take 1 tablet by mouth daily             lisinopril (PRINIVIL;ZESTRIL) 5 MG tablet  Take 1 tablet by mouth daily HOLD FOR SBP LESS THAN 90 AND HR LESS THAN 60             metoprolol succinate (TOPROL XL) 25 MG extended release tablet  Take 1 tablet by mouth daily Do not crush or chew. HOLD FOR SBP LESS THAN 90 AND HR LESS THAN 60             spironolactone (ALDACTONE) 25 MG tablet  Take 1 tablet by mouth daily             valACYclovir (VALTREX) 500 MG tablet  Take 1 tablet by mouth nightly             warfarin (COUMADIN) 2 MG tablet  Pharmacy to dose                 Objective Findings at Discharge:   BP 95/65   Pulse 66   Temp 98.3 °F (36.8 °C) (Oral)   Resp 14   Ht 5' 10\" (1.778 m)   Wt 189 lb (85.7 kg)   SpO2 99%   BMI 27.12 kg/m²            PHYSICAL EXAM   GEN Awake male, sitting upright in bed in no apparent distress. Appears given age. EYES Pupils are equally round. No scleral erythema, discharge, or conjunctivitis. HENT Mucous membranes are moist. Oral pharynx without exudates, no evidence of thrush. NECK Supple, no apparent thyromegaly or masses. RESP Clear to auscultation, no wheezes, rales or rhonchi. Symmetric chest movement while on room air. CARDIO/VASC S1/S2 auscultated. Regular rate without appreciable murmurs, rubs, or gallops. No JVD or carotid bruits. Peripheral pulses equal bilaterally and palpable. No peripheral edema. GI Abdomen is soft without significant tenderness, masses, or guarding. Bowel sounds are normoactive. Rectal exam deferred.  No costovertebral angle tenderness. Normal appearing external genitalia. Callejas catheter is not present.   HEME/LYMPH No palpable cervical lymphadenopathy and no hepatosplenomegaly. No petechiae or ecchymoses. MSK No gross joint deformities. SKIN Normal coloration, warm, dry. NEURO Cranial nerves appear grossly intact, normal speech, no lateralizing weakness. PSYCH Awake, alert, oriented x 4. Affect appropriate.     BMP/CBC  Recent Labs     08/26/21  0216   WBC 9.9   HCT 51.2          IMAGING:  See chart    Discharge Time of 42 minutes    Electronically signed by Eduar Rosado MD on 8/27/2021 at 8:59 AM

## 2021-08-27 NOTE — PROGRESS NOTES
7.0cm in diastole. No evidence of any pericardial effusion. Repeat study with Definity to rule out LV thrombus   Signature   ------------------------------------------------------------------  Electronically signed by Joshua Kumar MD (Interpreting  physician) on 08/23/2021 at 09:37 AM  ------------------------------------------------------------------   Findings   Left Ventricle  Left ventricular systolic function is abnormal.  Ejection fraction is visually estimated at <20%. Apical, septal, and anterior wall segments are akinetic. Apical ballooning noted. Severly dilated left ventricle measuring 7.0cm in diastole. Indeterminate diastolic function; E/A flow reversal is noted. Left Atrium  Essentially normal left atrium. Right Atrium  Essentially normal right atrium. Right Ventricle  Essentially normal right ventricle. Aortic Valve  Structurally normal aortic valve. Mitral Valve  Mild mitral regurgitation. Tricuspid Valve  Mild tricuspid regurgitation; RVSP: 30 mmHg. Pulmonic Valve  The pulmonic valve was not well visualized. Pericardial Effusion  No evidence of any pericardial effusion. Pleural Effusion  No evidence of pleural effusion. Miscellaneous  The aorta is within normal limits.   M-Mode/2D Measurements & Calculations   LV Diastolic Dimension:  LV Systolic Dimension:  LA Dimension: 3.9 cmAO Root  6.98 cm                  6.25 cm                 Dimension: 2.9 cmLA Area:  LV FS:10.5 %             LV Volume Diastolic:    26.2 cm2  LV PW Diastolic: 7.97 cm 819 ml  LV PW Systolic: 3.67 cm  LV Volume Systolic: 90  Septum Diastolic: 9.49   ml  cm                       LV EDV/LV EDV Index:    RV Diastolic Dimension:  Septum Systolic: 6.72 cm 898 DT/02 m2LV ESV/LV   2.52 cm  CO: 4.25 l/min           ESV Index: 90 ml/44 m2  CI: 2.08 l/m*m2          EF Calculated (A4C):    LA/Aorta: 1.34                           37.9 %  LV Area Diastolic: 00.8  EF Calculated (2D): 22  LA volume/Index: 38 ml  cm2                      %                       /19V9  LV Area Systolic: 29 cm2                           LV Length: 7.74 cm                            LVOT: 2.1 cm  Doppler Measurements & Calculations   MV Peak E-Wave: 60.2 AV Peak Velocity: 91.9 cm/s   LVOT Peak Velocity: 72.2  cm/s                 AV Peak Gradient: 3.38 mmHg   cm/s  MV Peak A-Wave: 80.5 AV Mean Velocity: 72.2 cm/s   LVOT Mean Velocity: 46.8  cm/s                 AV Mean Gradient: 2 mmHg      cm/s  MV E/A Ratio: 0.75   AV VTI: 20.8 cm               LVOT Peak Gradient: 2  MV Peak Gradient:    AV Area (Continuity):2.43 cm2 mmHgLVOT Mean Gradient: 1  1.45 mmHg                                          mmHg                       LVOT VTI: 14.6 cm             Estimated RVSP: 30 mmHg  MV P1/2t: 28 msec                                  Estimated RAP:3 mmHg  MVA by PHT:7.86 cm2  Estimated PASP: 14.42 mmHg   MV E' Septal                                       TR Velocity:169 cm/s  Velocity: 6.14 cm/s                                TR Gradient:11.42 mmHg  MV E' Lateral  Velocity: 4.61 cm/s  MV E/E' septal: 9.8  MV E/E' lateral:  13.06      Echocardiogram limited    Result Date: 8/23/2021  Transthoracic Echocardiography Report (TTE)  Demographics   Patient Name       Bonita Muñiz Date of Study       08/23/2021   Date of Birth      1985         Gender              Male   Age                30 year(s)         Race                Other   Patient Number     [id number]         Room Number         HX57   Visit Number       [id number]   Corporate ID       [id number]   Accession Number   [id number]         Sydnee Franco RVT   Ordering Physician Brigido Ferguson MD                 Physician           MD  Procedure Type of Study   TTE procedure:ECHOCARDIOGRAM LIMITED.   Procedure Date Date: 08/23/2021 Start: 10:37 AM Study Location: Portable Technical Quality: Fair visualization Indications:Low Ejection Fraction. Patient Status: Routine Contrast Medium: Definity. Amount - 2 ml Height: 70 inches Weight: 189 pounds BSA: 2.04 m2 BMI: 27.12 kg/m2  Conclusions   Summary  Left ventricular systolic function is abnormal.  Ejection fraction is visually estimated at <20%. Apical, septal, and anterior wall segments are akinetic. Apical ballooning noted. No evidence of thrombus; however, slow flow noted near the apical inferior  wall segment. No evidence of any pericardial effusion. Signature   ------------------------------------------------------------------  Electronically signed by Laure Holman MD (Interpreting  physician) on 08/23/2021 at 11:52 AM  ------------------------------------------------------------------   Findings   Left Ventricle  Left ventricular systolic function is abnormal.  Ejection fraction is visually estimated at <20%. Apical, septal, and anterior wall segments are akinetic. Apical ballooning noted. No evidence of thrombus. Slow flow noted near the apical inferior wall  segment. Pericardial Effusion  No evidence of any pericardial effusion. Pleural Effusion  No evidence of pleural effusion. CT HEAD WO CONTRAST    Result Date: 8/22/2021  EXAMINATION: CT OF THE HEAD WITHOUT CONTRAST  8/22/2021 5:19 pm TECHNIQUE: CT of the head was performed without the administration of intravenous contrast. Dose modulation, iterative reconstruction, and/or weight based adjustment of the mA/kV was utilized to reduce the radiation dose to as low as reasonably achievable. COMPARISON: None. HISTORY: ORDERING SYSTEM PROVIDED HISTORY: dysphasia TECHNOLOGIST PROVIDED HISTORY: Has a \"code stroke\" or \"stroke alert\" been called? ->Yes Reason for exam:->dysphasia Decision Support Exception - unselect if not a suspected or confirmed emergency medical condition->Emergency Medical Condition (MA) Reason for Exam: dysphasia Acuity: Acute Type of Exam: Initial FINDINGS: BRAIN/VENTRICLES: There is no acute intracranial hemorrhage, mass effect or midline shift. No abnormal extra-axial fluid collection. The gray-white differentiation is maintained without evidence of an acute infarct. There is no evidence of hydrocephalus. There are subtle areas of coarse intraparenchymal calcification within the left superior frontal gyrus subcortical white matter and right inferior frontal gyrus subcortical white matter ORBITS: The lens within the right globe is not visualized and there is hyperdensity filling the vitreous of the right globe. The visualized portion of the orbits demonstrate no acute abnormality. SINUSES: Mild mucosal thickening of the ethmoidal air cells. The visualized paranasal sinuses and mastoid air cells demonstrate no acute abnormality. SOFT TISSUES/SKULL:  No acute abnormality of the visualized skull or soft tissues. No acute intracranial abnormality. No acute territorial infarction, intracranial hemorrhage or mass lesion. There are subtle areas of coarse intraparenchymal calcification within the left superior frontal gyrus subcortical white matter and right inferior frontal gyrus subcortical white matter. Findings are nonspecific but may be suggestive chronic calcified stage is neurocysticercosis in correct clinical setting. The lens within the right globe is not visualized and there is hyperdensity filling the vitreous of the right globe. The finding is likely related to prior surgical intervention for retinal detachment. Clinical correlation is recommended. Critical results were called by Dr. Shea Holt MD to Dr. Mary Spann on 8/22/2021 at 17:31. XR CHEST PORTABLE    Result Date: 8/22/2021  EXAMINATION: ONE XRAY VIEW OF THE CHEST 8/22/2021 6:02 pm COMPARISON: None.  HISTORY: ORDERING SYSTEM PROVIDED HISTORY: dysphasia TECHNOLOGIST PROVIDED HISTORY: Reason for exam:->dysphasia Reason for TISSUES: The lung apices are clear. No cervical or superior mediastinal lymphadenopathy. The larynx and pharynx are unremarkable. No acute abnormality of the salivary and thyroid glands. BONES: No acute osseous abnormality. CTA HEAD: ANTERIOR CIRCULATION: No significant stenosis of the intracranial internal carotid, anterior cerebral, or middle cerebral arteries. No aneurysm. Right SHUN A1 segment is hypoplastic. POSTERIOR CIRCULATION: No significant stenosis of the vertebral, basilar, or posterior cerebral arteries. No aneurysm. OTHER: No dural venous sinus thrombosis on this non-dedicated study. BRAIN: No mass effect or midline shift. No extra-axial fluid collection. The gray-white differentiation is maintained. Unremarkable CTA of the head and neck. CTA HEAD W CONTRAST    Result Date: 8/22/2021  EXAMINATION: CTA OF THE NECK; CTA OF THE HEAD WITH CONTRAST 8/22/2021 5:43 pm; 8/22/2021 5:40 pm: TECHNIQUE: CTA of the neck was performed with the administration of intravenous contrast. Multiplanar reformatted images are provided for review. MIP images are provided for review. Stenosis of the internal carotid arteries measured using NASCET criteria. Dose modulation, iterative reconstruction, and/or weight based adjustment of the mA/kV was utilized to reduce the radiation dose to as low as reasonably achievable.; CTA of the head/brain was performed with the administration of intravenous contrast. Multiplanar reformatted images are provided for review. MIP images are provided for review. Dose modulation, iterative reconstruction, and/or weight based adjustment of the mA/kV was utilized to reduce the radiation dose to as low as reasonably achievable. COMPARISON: None. HISTORY: ORDERING SYSTEM PROVIDED HISTORY: dysphasia TECHNOLOGIST PROVIDED HISTORY: Has a \"code stroke\" or \"stroke alert\" been called? ->Yes Reason for exam:->dysphasia Decision Support Exception - unselect if not a suspected or confirmed emergency medical condition->Emergency Medical Condition (MA) Reason for Exam: dysphasia Acuity: Acute Type of Exam: Initial FINDINGS: CTA NECK: AORTIC ARCH/ARCH VESSELS: No dissection or arterial injury. No significant stenosis of the brachiocephalic or subclavian arteries. CAROTID ARTERIES: No dissection, arterial injury, or hemodynamically significant stenosis by NASCET criteria. VERTEBRAL ARTERIES: No dissection, arterial injury, or significant stenosis. SOFT TISSUES: The lung apices are clear. No cervical or superior mediastinal lymphadenopathy. The larynx and pharynx are unremarkable. No acute abnormality of the salivary and thyroid glands. BONES: No acute osseous abnormality. CTA HEAD: ANTERIOR CIRCULATION: No significant stenosis of the intracranial internal carotid, anterior cerebral, or middle cerebral arteries. No aneurysm. Right SHUN A1 segment is hypoplastic. POSTERIOR CIRCULATION: No significant stenosis of the vertebral, basilar, or posterior cerebral arteries. No aneurysm. OTHER: No dural venous sinus thrombosis on this non-dedicated study. BRAIN: No mass effect or midline shift. No extra-axial fluid collection. The gray-white differentiation is maintained. Unremarkable CTA of the head and neck. MRI BRAIN WO CONTRAST    Result Date: 8/23/2021  EXAMINATION: MRI OF THE BRAIN WITHOUT CONTRAST  8/23/2021 9:55 am TECHNIQUE: Multiplanar multisequence MRI of the brain was performed without the administration of intravenous contrast. COMPARISON: CT head 08/22/2021 HISTORY: ORDERING SYSTEM PROVIDED HISTORY: Acute CVA,  chronic calcified stage is neurocysticercosis? TECHNOLOGIST PROVIDED HISTORY: Reason for exam:->Acute CVA,  chronic calcified stage is neurocysticercosis?  Reason for Exam: evaluate for CVA, extremity numbness Acuity: Acute Type of Exam: Initial FINDINGS: INTRACRANIAL STRUCTURES/VENTRICLES: Small acute punctate infarct is identified within the left insular cortex within the frontal lobe. no mass effect or midline shift. No evidence of an acute intracranial hemorrhage. The ventricles and sulci are normal in size and configuration. The sellar/suprasellar regions appear unremarkable. The normal signal voids within the major intracranial vessels appear maintained. ORBITS: Hypertense signal identified within the right globe which could be related to prior orbital hemorrhage silicone injection. SINUSES: Mild mucosal thickening is identified within the paranasal sinuses. BONES/SOFT TISSUES: The bone marrow signal intensity appears normal. The soft tissues demonstrate no acute abnormality. Small acute punctate infarct within the left frontal lobe involving the insular cortex. High signal intensity within the right globe could be related to vitreous hemorrhage versus surgical intervention.      NM MYOCARD INFARCT SPECT SCAN    Result Date: 8/24/2021  Cardiac Perfusion Imaging   Demographics   Patient Name      Shaista Taylor Date of study        08/24/2021   Date of Birth     1985         Gender               Male   Age               30 year(s)         Race                 Other   Patient Number    [id number]         Room Number          2128   Visit Number      [id number]          Height               70 inches   Corporate ID      [id number]           Weight               189 pounds   Accession Number  [id number]                                        NM Technologist      Glenroy Zamudio, [address] [place] [address], [place] Janie Avila  Physician         MD                 Cardiologist         MD   Conclusions   Summary  Apical infarct with no perfusion in delayed images  Anterior , septal , inferior and lateral walls have perfusion in delayed  images   Signatures   ------------------------------------------------------------------  Electronically signed by Rekha Gamez MD (Interpreting  cardiologist) on 08/24/2021 at 18:09 ------------------------------------------------------------------  Procedure Admit Source:Emergency department. Procedure Type:   Viability study:RESTING THALL VIABIL W/REINJ MED  Indications: To evaluate viability. Risk Factors   The patient risk factors include:cerebrovascular disease, Current - Every  day tobacco use, treated hypertension, last creatinine: 1.3 mg/dl and  creatinine clearance: 97.09 ml/min. Viability Imaging Protocols   Isotope: Thallium 201       Scan times  Isotope dose:3 mCi          1st: Rest  Administration route: I.V.  2nd: Rest  Date: 08/24/2021 11:30  Isotope Reinjection  Isotope dose:1.1 mCi  Administration route: I.V. Date: 08/24/2021 14:45   Technique:      Rest reinjection                  SPECT  Medical History   Accession#:  [id number]  Admission Data Admission date: 08/22/2021 Admission Time: 16:25 Insurance payors: Private health insurance. Hospital Status: Inpatient.       LAB RESULTS  --------------------    Recent Results (from the past 24 hour(s))   APTT    Collection Time: 08/26/21  2:16 AM   Result Value Ref Range    aPTT 34.7 25.1 - 37.1 SECONDS   CBC    Collection Time: 08/26/21  2:16 AM   Result Value Ref Range    WBC 9.9 4.0 - 10.5 K/CU MM    RBC 6.10 4.6 - 6.2 M/CU MM    Hemoglobin 16.2 13.5 - 18.0 GM/DL    Hematocrit 51.2 42 - 52 %    MCV 83.9 78 - 100 FL    MCH 26.6 (L) 27 - 31 PG    MCHC 31.6 (L) 32.0 - 36.0 %    RDW 14.1 11.7 - 14.9 %    Platelets 500 378 - 811 K/CU MM    MPV 10.1 7.5 - 11.1 FL   Protime-INR    Collection Time: 08/26/21  2:16 AM   Result Value Ref Range    Protime 13.8 11.7 - 14.5 SECONDS    INR 1.07 INDEX   APTT    Collection Time: 08/26/21  8:09 AM   Result Value Ref Range    aPTT 35.2 25.1 - 37.1 SECONDS   APTT    Collection Time: 08/26/21 12:21 PM   Result Value Ref Range    aPTT 34.9 25.1 - 37.1 SECONDS         Medical problems    Patient Active Problem List:     NSTEMI (non-ST elevated myocardial infarction) ([facility] [address]     Cerebrovascular accident (CVA) due to embolism ([address]     Aphasia     TIA (transient ischemic attack)      ASSESSMENT:  ---------------------    Left Frontal acute infarct r/o cardio carotid embolic event- cardioembolic event from low EF 20 % is a possibility-cardiology is suspicious of an apical Ventricular thrombotic etiology and started pt on asa and coumadin. CTA head neck was neg for carotid stenosis.     Left Frontal calcific lesion-cysticercosis or other non-specific inflammatory infectious possibilities were entertained on discussion with Dr. La Nena Doll Neuro-radiologist on the phone after looking at the CT and Mri brain-discussed option of treating with anti-parasitic meds and discussed possibile complications of inducing inflammatory reaction in a calcific lesion that more likely has been there for some time and possibly chronic that is highly likely asymptomatic at [facility] time and patient chose not to be treated with anti-parasitic meds at this time. Will do serial CT brain every 6-12  Months to assess left frontal calcific lesion. 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     Consult ID on treatment if needed on Cysticercosis     discussed dx prognosis meds side effects and above with pt and answered all questions.         Electronically signed by Jyotsna Garland MD on 8/26/2021 at 10:31 PM

## 2021-08-27 NOTE — TELEPHONE ENCOUNTER
Referral from [facility]. Left message with Ethel Rodriguez at office of Dr. Alvy Bamberger to request referral.     Bishnu Hem to [facility] and referral sent. Called patient and left message for patient to call office.      For Pharmacy Admin Tracking Only     Intervention Detail:    Total # of Interventions Recommended:    Total # of Interventions Accepted:    Time Spent (min): 10

## 2021-08-27 NOTE — PROGRESS NOTES
Nutrition Assessment    Type and Reason for Visit:  Initial, Positive Nutrition Screen (Food preferences)    Nutrition Recommendations/Plan:   Continue current diet   Recommend consistent Vitamin K (foods with vitamin K including green leafy foods) intake while on warfarin     Nutrition Assessment:  Pt admitted with NSTEMI, acute CVA possibly d/t embolism, TIA. Minimal PMH. Pt on regulat diet, but receives homemade, vegetarian meals from family TID. Denies any wt loss or appetite changes during or PTA. Reports good appetite and no n/. Discussed using lower sodium cooking in meals and non-salt spices. Denies further nutrition needs at this time. Low nutrition risk at this time. Nutrition Related Findings:  rx: warfarin, foltx      Wounds:  None       Current Nutrition Therapies:    ADULT DIET; Regular    Anthropometric Measures:  · Height: 5' 10\" (177.8 cm)  · Current Body Weight: 188 lb 15 oz (85.7 kg) (79 kg at visit)   · Admission Body Weight:  (stated ?)    · Usual Body Weight:  (n/a)     · Ideal Body Weight: 166 lbs; % Ideal Body Weight 113.8 %   · BMI: 27.1  · Adjusted Body Weight:  ;     · Adjusted BMI:      · BMI Categories: Overweight (BMI 25.0-29. 9)       Nutrition Diagnosis:   No nutrition diagnosis at this time    Nutrition Interventions:   Food and/or Nutrient Delivery:  Continue Current Diet  Nutrition Education/Counseling:  Education initiated   Coordination of Nutrition Care:  Continue to monitor while inpatient, Coordination of Community Care    Goals:  Pt will consume at least % of homemade meals during stay       Nutrition Monitoring and Evaluation:   Behavioral-Environmental Outcomes:  None Identified   Food/Nutrient Intake Outcomes:  Food and Nutrient Intake  Physical Signs/Symptoms Outcomes:  Biochemical Data, Weight, Meal Time Behavior     Discharge Planning:    Continue current diet     Electronically signed by Franchesca Mcqueen RD, LD on 8/27/21 at 11:54 AM EDT    Contact: [phone]

## 2021-08-27 NOTE — TELEPHONE ENCOUNTER
Debby Black is calling from the Coumadin clinic wanting a referral to the coumadin clinic for this patient so that they can get him scheduled.

## 2021-08-27 NOTE — PROGRESS NOTES
Cardiology Progress Note     Admit Date:  8/22/2021    Consult reason/ Seen today for :   NSTEMI  CVA    Subjective and  Overnight Events :Life vest in place. Patient okay to go home. Follow up in office     Echo shows reduced EF of 25-30 % . Cardiac cath showed LAD thrombus with 100% occluded LAD MRI confirms acute frontal stroke      Chief complain on admission : 30 y. o.year old who is admitted for  Chief Complaint   Patient presents with    Numbness     Jaw since 10 am      Assessment / Plan:  ASCVD: NSTEMI: Ischemic cardiomyopathy with anterior wall infarct septal Q waves. Apical infarct , ant wall is viable will follow up as outpatient , ok to discharge. Life vest in place   Cardiomyopathy: EF of 20% Continue Toprol-XL lisinopril aldactone. Further evaluation of farxiga and entresto as an outpatient if needed. At this time is not indicated. Acute CVA: Possible embolic stroke from LV due to severe cardiomyopathy, although Definity did not show thrombus, continue Coumadin, due to small infarct (low risk for hemorrhagic conversion)  Neurocysticercosis as per  infectious disease evaluation  HTN: stable, continue To titrate up medication as needed  DVT Prophylaxis if no contraindication    Past medical history:    has no past medical history on file. Past surgical history:   has no past surgical history on file. Social History:   reports that he has been smoking. He started smoking 5 days ago. He has been smoking about 0.50 packs per day. He does not have any smokeless tobacco history on file. He reports that he does not use drugs. Family history:  family history is not on file.     No Known Allergies    Review of Systems:    All 14 systems were reviewed and are negative  Except for the positive findings  which as documented     BP 95/65   Pulse 66   Temp 98.3 °F (36.8 °C) (Oral)   Resp 14   Ht 5' 10\" (1.778 m)   Wt 189 lb (85.7 kg)   SpO2 99%   BMI 27.12 kg/m²     No intake or output data in the 24 hours ending 08/27/21 0740    Physical Exam  Vitals reviewed. Constitutional:       General: He is not in acute distress. Appearance: Normal appearance. He is obese. He is not ill-appearing. HENT:      Head: Atraumatic. Neck:      Vascular: No carotid bruit. Cardiovascular:      Rate and Rhythm: Normal rate and regular rhythm. Pulses: Normal pulses. Heart sounds: Normal heart sounds. No murmur heard. Pulmonary:      Effort: Pulmonary effort is normal. No respiratory distress. Breath sounds: Normal breath sounds. Musculoskeletal:         General: No swelling or deformity. Cervical back: Neck supple. No muscular tenderness. Neurological:      Mental Status: He is alert. Medications:    warfarin  4 mg Oral Daily    spironolactone  25 mg Oral Daily    folic acid-pyridoxine-cyancobalamin  1 tablet Oral Daily    sodium chloride flush  5-40 mL IntraVENous 2 times per day    aspirin  81 mg Oral Daily    atorvastatin  80 mg Oral Nightly    metoprolol succinate  25 mg Oral Daily    lisinopril  5 mg Oral Daily    valACYclovir  500 mg Oral Nightly      sodium chloride       sodium chloride flush, sodium chloride, ondansetron **OR** ondansetron, acetaminophen **OR** acetaminophen, polyethylene glycol    Lab Data:  CBC:   Recent Labs     08/24/21  0756 08/26/21  0216   WBC 10.1 9.9   HGB 15.3 16.2   HCT 48.6 51.2   MCV 85.0 83.9    279     BMP:   No results for input(s): NA, K, CL, CO2, PHOS, BUN, CREATININE in the last 72 hours. Invalid input(s): CA  PT/INR:   Recent Labs     08/25/21  0111 08/26/21  0216 08/27/21  0529   PROTIME 12.9 13.8 14.4   INR 1.00 1.07 1.12     BNP:  No results for input(s): PROBNP in the last 72 hours. TROPONIN:   No results for input(s): TROPONINT in the last 72 hours. ECHO :   echocardiogram    Assessment:  30 y. o.year old who is admitted for  Chief Complaint   Patient presents with    Numbness     Jaw since 10 am    , active intervention for LAD since he has viable anterior wall  Continue cardiac meds  Discharged with LifeVest  Follow-up in office  To gt Bean MD [facility] 08/27/21

## 2021-08-31 ENCOUNTER — OFFICE VISIT (OUTPATIENT)
Dept: CARDIOLOGY CLINIC | Age: 36
End: 2021-08-31
Payer: COMMERCIAL

## 2021-08-31 ENCOUNTER — ANTI-COAG VISIT (OUTPATIENT)
Dept: PHARMACY | Age: 36
End: 2021-08-31
Payer: COMMERCIAL

## 2021-08-31 ENCOUNTER — HOSPITAL ENCOUNTER (OUTPATIENT)
Age: 36
Discharge: HOME | End: 2021-08-31
Payer: COMMERCIAL

## 2021-08-31 VITALS
HEART RATE: 79 BPM | WEIGHT: 189 LBS | HEIGHT: 70 IN | BODY MASS INDEX: 27.06 KG/M2 | DIASTOLIC BLOOD PRESSURE: 64 MMHG | SYSTOLIC BLOOD PRESSURE: 108 MMHG

## 2021-08-31 DIAGNOSIS — G45.9 TIA (TRANSIENT ISCHEMIC ATTACK): ICD-10-CM

## 2021-08-31 DIAGNOSIS — I25.5 ISCHEMIC CARDIOMYOPATHY: ICD-10-CM

## 2021-08-31 DIAGNOSIS — I63.429 CEREBROVASCULAR ACCIDENT (CVA) DUE TO EMBOLISM OF ANTERIOR CEREBRAL ARTERY, UNSPECIFIED BLOOD VESSEL LATERALITY (HCC): Primary | ICD-10-CM

## 2021-08-31 DIAGNOSIS — I63.429 CEREBROVASCULAR ACCIDENT (CVA) DUE TO EMBOLISM OF ANTERIOR CEREBRAL ARTERY, UNSPECIFIED BLOOD VESSEL LATERALITY (HCC): ICD-10-CM

## 2021-08-31 DIAGNOSIS — I21.4 NSTEMI (NON-ST ELEVATED MYOCARDIAL INFARCTION) (HCC): Primary | ICD-10-CM

## 2021-08-31 DIAGNOSIS — I21.4 NSTEMI (NON-ST ELEVATED MYOCARDIAL INFARCTION) (HCC): ICD-10-CM

## 2021-08-31 LAB
INR BLD: 1.11 INDEX
PROTHROMBIN TIME: 14.3 SECONDS (ref 11.7–14.5)

## 2021-08-31 PROCEDURE — 99211 OFF/OP EST MAY X REQ PHY/QHP: CPT

## 2021-08-31 PROCEDURE — 1111F DSCHRG MED/CURRENT MED MERGE: CPT | Performed by: INTERNAL MEDICINE

## 2021-08-31 PROCEDURE — 85610 PROTHROMBIN TIME: CPT

## 2021-08-31 PROCEDURE — 36415 COLL VENOUS BLD VENIPUNCTURE: CPT

## 2021-08-31 PROCEDURE — 99214 OFFICE O/P EST MOD 30 MIN: CPT | Performed by: INTERNAL MEDICINE

## 2021-08-31 RX ORDER — LISINOPRIL 10 MG/1
10 TABLET ORAL DAILY
Qty: 90 TABLET | Refills: 3 | Status: SHIPPED | OUTPATIENT
Start: 2021-08-31 | End: 2021-09-21 | Stop reason: SDUPTHER

## 2021-08-31 RX ORDER — METOPROLOL SUCCINATE 50 MG/1
50 TABLET, EXTENDED RELEASE ORAL DAILY
Qty: 90 TABLET | Refills: 3 | Status: SHIPPED | OUTPATIENT
Start: 2021-08-31 | End: 2021-09-21 | Stop reason: SDUPTHER

## 2021-08-31 NOTE — PROGRESS NOTES
CARDIOLOGY  NOTE    Chief Complaint: CHF/ cardiomyopathy /ASCVD    HPI:   Sage Amaro is a 30y.o. year old who has Past medical history as noted below. He comes in for follow up from hospital he was hospitalized after recent CVA causing aphasia but is recovered from his CVA. During work-up he was also found to have severe cardiomyopathy with ejection fraction of 20% with akinetic apex and septal wall. Cardiac cath revealed occluded LAD. Viability study shows infarcted apex but viable anterior wall. He says his breathing is okay he is tolerating meds he was started on guideline recommended medical therapy he has no ankle swelling or shortness of breath he was started on Coumadin due to concerns for possible embolic event from dilated cardiomyopathy. 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 Current Outpatient Medications   Medication Sig Dispense Refill    lisinopril (PRINIVIL;ZESTRIL) 10 MG tablet Take 1 tablet by mouth daily HOLD FOR SBP LESS THAN 90 AND HR LESS THAN 60 90 tablet 3    metoprolol succinate (TOPROL XL) 50 MG extended release tablet Take 1 tablet by mouth daily Do not crush or chew. HOLD FOR SBP LESS THAN 90 AND HR LESS THAN 60 90 tablet 3    warfarin (COUMADIN) 2 MG tablet Pharmacy to dose 30 tablet 3    aspirin 81 MG chewable tablet Take 1 tablet by mouth daily 30 tablet 0    atorvastatin (LIPITOR) 80 MG tablet Take 1 tablet by mouth nightly 30 tablet 0    valACYclovir (VALTREX) 500 MG tablet Take 1 tablet by mouth nightly 30 tablet 0    folic acid-pyridoxine-cyancobalamin (FOLTX) 1.13-25-2 MG TABS Take 1 tablet by mouth daily 30 tablet 0    spironolactone (ALDACTONE) 25 MG tablet Take 1 tablet by mouth daily 30 tablet 0     No current facility-administered medications for this visit. Allergies:   Patient has no known allergies. Patient History:  History reviewed. No pertinent past medical history. History reviewed. No pertinent surgical history. History reviewed. No pertinent family history. Social History     Tobacco Use    Smoking status: Former Smoker     Packs/day: 0.50     Start date: 2021     Quit date: 2021     Years since quittin.0    Smokeless tobacco: Never Used   Substance Use Topics    Alcohol use: Not on file        Review of Systems:   · Constitutional: No Fever or Weight Loss   · Eyes: No Decreased Vision  · ENT: No Headaches, Hearing Loss or Vertigo  · Cardiovascular: as per note above   · Respiratory: No cough or wheezing and as per note above. · Gastrointestinal: No abdominal pain, appetite loss, blood in stools, constipation, diarrhea or heartburn  · Genitourinary: No dysuria, trouble voiding, or hematuria  · Musculoskeletal:  denies any new  joint aches , swelling  or pain   · Integumentary: No rash or pruritis  · Neurological: No TIA or stroke symptoms  · Psychiatric: No anxiety or depression  · Endocrine: No malaise, fatigue or temperature intolerance  · Hematologic/Lymphatic: No bleeding problems, blood clots or swollen lymph nodes  · Allergic/Immunologic: No nasal congestion or hives    Objective:      Physical Exam:  /64   Pulse 79   Ht 5' 10\" (1.778 m)   Wt 189 lb (85.7 kg)   BMI 27.12 kg/m²   Wt Readings from Last 3 Encounters:   21 189 lb (85.7 kg)   21 189 lb (85.7 kg)     Body mass index is 27.12 kg/m². Vitals:    21 1230   BP: 108/64   Pulse: 79        General Appearance:  No distress, conversant  Constitutional:  Well developed, Well nourished, No acute distress, Non-toxic appearance. HENT:  Normocephalic, Atraumatic, Bilateral external ears normal, Oropharynx moist, No oral exudates, Nose normal. Neck- Normal range of motion, No tenderness, Supple, No stridor,no apical-carotid delay  Eyes:  PERRL, EOMI, Conjunctiva normal, No discharge.    Respiratory:  Normal breath sounds, No respiratory distress, No wheezing, No chest tenderness. ,no use of accessory muscles, NO crackles  Cardiovascular: (PMI) apex non displaced,no lifts no thrills,S1 and S2 audible, No added heart sounds, No signs of ankle edema, or volume overload, No evidence of JVD, No crackles  GI:  Bowel sounds normal, Soft, No tenderness, No masses, No gross visceromegaly   :  No costovertebral angle tenderness   Musculoskeletal:  No edema, no tenderness, no deformities. Back- no tenderness  Integument:  Well hydrated, no rash   Lymphatic:  No lymphadenopathy noted   Neurologic:  Alert & oriented x 3, CN 2-12 normal, normal motor function, normal sensory function, no focal deficits noted   Psychiatric:  Speech and behavior appropriate       Medical decision making and Data review:  DATA:  Lab Results   Component Value Date    TROPONINT 2.320 ([facility]) 08/22/2021     BNP:  No results found for: PROBNP  PT/INR:  No results found for: PTINR  No results found for: LABA1C  Lab Results   Component Value Date    CHOL 149 08/22/2021    TRIG 148 08/22/2021    HDL 28 (L) 08/22/2021    LDLDIRECT 94 08/22/2021     Lab Results   Component Value Date    ALT 30 08/22/2021    AST 30 08/22/2021     No results for input(s): WBC, HGB, HCT, MCV, PLT in the last 72 hours. TSH: No results found for: TSH  Lab Results   Component Value Date    AST 30 08/22/2021    ALT 30 08/22/2021    BILITOT 0.4 08/22/2021    ALKPHOS 95 08/22/2021     No results found for: PROBNP  No results found for: LABA1C  Lab Results   Component Value Date    WBC 9.9 08/26/2021    HGB 16.2 08/26/2021    HCT 51.2 08/26/2021     08/26/2021     Cath 8/23/21  LMCA: Normal.     LAD: Abnormal.100% occluded prox LAD with Collaterals from Rt filling the LAD     LCx: Moderate Lumen Irregularity. Right dominance noted. OM1 prox 50% stenosis     RCA: Normal.Right dominance noted.   Rt to Left Collaterals noted to LAD territory     Echo 8/23/21   Summary   Left ventricular systolic function is abnormal.   Ejection fraction is visually estimated at <20%. Apical, septal, and anterior wall segments are akinetic. Apical ballooning noted. Severly dilated left ventricle measuring 7.0cm in diastole. No evidence of any pericardial effusion. Repeat study with Definity to rule out LV thrombus             All labs, medications and tests reviewed by myself including data and history from outside source , patient and available family . Assessment & Plan:      1. Cerebrovascular accident (CVA) due to embolism of anterior cerebral artery, unspecified blood vessel laterality ([address]    2. TIA (transient ischemic attack)    3. Ischemic cardiomyopathy    4. NSTEMI (non-ST elevated myocardial infarction) ([address]         Ischemic cardiomyopathy   History of 20% wearing LifeVest continue to titrate up medication increase Toprol-XL 50 mg daily and lisinopril 10 mg daily. Anterior wall is viable will debate LAD intervention after discussing with  team.  Reevaluate in 1 month debate ICD in 3 months    TIA (transient ischemic attack)   Acute CVA probably secondary to embolic event due to LV function possible source of embolic event continue Coumadin keep INR between 2-2.5 referred to Coumadin clinic    Cerebrovascular accident (CVA) due to embolism ([facility]  Continue Coumadin     Dyslipidemia :  All available lab work was reviewed. Patient was advised to repeat lab work before next visit. Necessary orders were placed , instructions given by myself       Counseled extensively and medication compliance urged. We discussed that for the  prevention of ASCVD our  goal is aggressive risk modification. Patient is encouraged to exercise if they can , educated about  brisk walk for 30 minutes  at least 3 to 4 times a week if there are no physical limitations  Various goals were discussed and questions answered. Continue current medications. Appropriate prescriptions are addressed and refills ordered.   Questions answered and patient verbalizes understanding. Call for any problems, questions, or concerns. Greater than 60 % of time spent counseling besides reviewing data and images     Continue all other medications of all above medical condition listed as is. Return in about 2 weeks (around 9/14/2021). Please note this report has been partially produced using speech recognition software and may contain errors related to that system including errors in grammar, punctuation, and spelling, as well as words and phrases that may be inappropriate. If there are any questions or concerns please feel free to contact the dictating provider for clarification.

## 2021-08-31 NOTE — PROGRESS NOTES
Medication Management Service  [address] [phone]    Visit Date: 8/31/2021   Subjective: This is a telephone visit. Franklyn Son is a 30 y.o. male who is having INR checked by [facility]. INR results were sent to the clinic for anticoagulation monitoring and adjustment. Patient results called in to clinic for warfarin management due to  Indication:   CVA, MI and TIA. INR goal: of 2.0 - 2.5  Duration of therapy: indefinite. Patient reports the following:   Adherent with regimen - Patient was new start on warfarin in the hospital. Warfarin started 8/24 - 6.5mg, 8/25 - 2.5mg, 8/26 - 4mg. Patient discharged 8/27 and has been taking warfarin 2mg daily since. Missed or extra doses:  None   Bleeding or thromboembolic side effects:  None  Significant medication changes:  None  Significant dietary changes: None  Significant alcohol or tobacco changes: None  Significant recent illness, disease state changes, or hospitalization:  None  Upcoming surgeries or procedures:  None  Falls: None           Assessment and Plan     PT/INR performed by Lab. INR today is 1.11, subtherapeutic. Plan:  Take warfarin 6mg x 2 days. Recheck INR in 2 days. Patient has standing lab orders for PT/INR. Patient verbalized understanding of dosing directions and information discussed. Progress note sent to referring office. Patient acknowledges working in consult agreement with pharmacist as referred by his/her physician. Patient accepted that for purposes of billing, this is a virtual visit with your provider for which we will submit a claim for reimbursement with your insurance company. You may be responsible for any copays, coinsurance amounts or other amounts not covered by your insurance company.      Electronically signed by Parish Holder, [address] [place] [address] on 8/31/21 at 4:31 PM [facility] Detail: Dose Adjustment: 1, reason: Therapy Optimization   Total # of Interventions Recommended: 1   Total # of Interventions Accepted: 1   Time Spent (min): 20

## 2021-09-02 ENCOUNTER — ANTI-COAG VISIT (OUTPATIENT)
Dept: PHARMACY | Age: 36
End: 2021-09-02
Payer: COMMERCIAL

## 2021-09-02 DIAGNOSIS — I21.4 NSTEMI (NON-ST ELEVATED MYOCARDIAL INFARCTION) (HCC): Primary | ICD-10-CM

## 2021-09-02 DIAGNOSIS — G45.9 TIA (TRANSIENT ISCHEMIC ATTACK): ICD-10-CM

## 2021-09-02 LAB
INTERNATIONAL NORMALIZATION RATIO, POC: 1.3
POC INR: 1.3 INDEX
PROTHROMBIN TIME, POC: 16 SECONDS (ref 10–14.3)

## 2021-09-02 PROCEDURE — 99213 OFFICE O/P EST LOW 20 MIN: CPT

## 2021-09-02 PROCEDURE — 36416 COLLJ CAPILLARY BLOOD SPEC: CPT

## 2021-09-02 PROCEDURE — 85610 PROTHROMBIN TIME: CPT

## 2021-09-02 NOTE — PROGRESS NOTES
Medication Management Service  [facility]  [phone]    Visit Date: 9/2/2021   Subjective:       Crissy Navarro is a 30 y.o. male who presents to clinic today for anticoagulation monitoring and adjustment. Patient seen in clinic for warfarin management due to  Indication:   CVA and TIA. INR goal: of 2.0-2.5  Duration of therapy: indefinite. Patient reports the following:   Adherent with regimen  Missed or extra doses:  None   Bleeding or thromboembolic side effects:  None  Significant medication changes:  None  Significant dietary changes: None  Significant alcohol or tobacco changes: None  Significant recent illness, disease state changes, or hospitalization:  None  Upcoming surgeries or procedures:  None  Falls: None           Assessment and Plan     PT/INR done in office per protocol. INR today is 1.3, subtherapeutic, but slowly increasing after 6mg booster doses. Patient agreeable to [facility] lab on 9/7 due to Labor Day closures. Last 7 day weekly dose of 24mg. Will increase to weekly dose of 30mg and monitor closely. Plan:  Increase weekly dose ~25% to warfarin 4mg daily except 6mg on Sundays     Recheck INR in 5 days. Patient is new to clinic. Patient provided written education pamphlet and verbal counseling regarding warfarin's mechanism of action, compliance in taking medication as instructed, PT/INR monitoring, follow up with healthcare provider, side effects, drug and food reactions and interactions and dietary advice. Patient verbalized understanding of dosing directions and information discussed. Dosing schedule given to patient including phone number, appointment date, and time. Progress note sent to referring office. Patient acknowledges working in consult agreement with pharmacist as referred by his/her physician.       Electronically signed by QUETA Paredes JOSÉ MIGUEL [facility] on 9/2/21 at 6:29 PM EDT  For Pharmacy Admin Tracking Only    Intervention Detail: Adherence Monitorin and Dose Adjustment: 1, reason: Therapy Optimization  Total # of Interventions Recommended: 2  Total # of Interventions Accepted: 2  Time Spent (min): 30       

## 2021-09-07 ENCOUNTER — ANTI-COAG VISIT (OUTPATIENT)
Dept: PHARMACY | Age: 36
End: 2021-09-07
Payer: COMMERCIAL

## 2021-09-07 DIAGNOSIS — I21.4 NSTEMI (NON-ST ELEVATED MYOCARDIAL INFARCTION) (HCC): Primary | ICD-10-CM

## 2021-09-07 DIAGNOSIS — G45.9 TIA (TRANSIENT ISCHEMIC ATTACK): ICD-10-CM

## 2021-09-07 LAB
INR BLD: 1.56 INDEX
PROTHROMBIN TIME: 20.2 SECONDS (ref 11.7–14.5)

## 2021-09-07 PROCEDURE — 36415 COLL VENOUS BLD VENIPUNCTURE: CPT

## 2021-09-07 PROCEDURE — 99212 OFFICE O/P EST SF 10 MIN: CPT

## 2021-09-07 PROCEDURE — 85610 PROTHROMBIN TIME: CPT

## 2021-09-07 NOTE — PROGRESS NOTES
Medication Management Service  [address] [phone]    Visit Date: 9/7/2021   Subjective: This is a telephone visit. General Carlson is a 30 y.o. male who is having INR checked by [facility]. INR results were sent to the clinic for anticoagulation monitoring and adjustment. Patient results called in to clinic for warfarin management due to  Indication:   CVA, MI and TIA. INR goal: of 2.0 - 2.5. Duration of therapy: indefinite. Patient reports the following:   Adherent with regimen  Missed or extra doses:  None   Bleeding or thromboembolic side effects:  None  Significant medication changes:  None  Significant dietary changes: None  Significant alcohol or tobacco changes: None  Significant recent illness, disease state changes, or hospitalization:  None  Upcoming surgeries or procedures:  None  Falls: None           Assessment and Plan     PT/INR performed by Lab. INR today is 1.56, subtherapeutic. Plan:  Take warfarin 6mg x 1 then increase dose to warfarin 6mg daily except 4mg Mondays, Wednesdays, and Fridays. Recheck INR in 2 days. Patient has standing lab orders for PT/INR. Patient verbalized understanding of dosing directions and information discussed. Progress note sent to referring office. Patient acknowledges working in consult agreement with pharmacist as referred by his/her physician. Patient accepted that for purposes of billing, this is a virtual visit with your provider for which we will submit a claim for reimbursement with your insurance company. You may be responsible for any copays, coinsurance amounts or other amounts not covered by your insurance company.      Electronically signed by Audi Yoo, [facility] on 9/7/21 at 4:17 PM EDT    For Pharmacy Admin Tracking Only     Intervention Detail: Dose Adjustment: 1, reason: Therapy Optimization   Total # of Interventions Recommended: 1   Total # of Interventions Accepted: 1   Time Spent (min): 15

## 2021-09-09 ENCOUNTER — ANTI-COAG VISIT (OUTPATIENT)
Dept: PHARMACY | Age: 36
End: 2021-09-09
Payer: COMMERCIAL

## 2021-09-09 DIAGNOSIS — G45.9 TIA (TRANSIENT ISCHEMIC ATTACK): ICD-10-CM

## 2021-09-09 DIAGNOSIS — I21.4 NSTEMI (NON-ST ELEVATED MYOCARDIAL INFARCTION) (HCC): Primary | ICD-10-CM

## 2021-09-09 LAB
INTERNATIONAL NORMALIZATION RATIO, POC: 1.9
POC INR: 1.9 INDEX
PROTHROMBIN TIME, POC: 23.1 SECONDS (ref 10–14.3)

## 2021-09-09 PROCEDURE — 99212 OFFICE O/P EST SF 10 MIN: CPT

## 2021-09-09 PROCEDURE — 85610 PROTHROMBIN TIME: CPT

## 2021-09-09 PROCEDURE — 36416 COLLJ CAPILLARY BLOOD SPEC: CPT

## 2021-09-09 RX ORDER — WARFARIN SODIUM 2 MG/1
6 TABLET ORAL DAILY
Qty: 80 TABLET | Refills: 0 | Status: SHIPPED | OUTPATIENT
Start: 2021-09-09 | End: 2021-09-27 | Stop reason: SDUPTHER

## 2021-09-09 NOTE — PROGRESS NOTES
Medication Management Service  [facility]  [phone]    Visit Date: 9/9/2021   Subjective:       Mariah Short is a 30 y.o. male who presents to clinic today for anticoagulation monitoring and adjustment. Patient seen in clinic for warfarin management due to  Indication:   CVA and TIA. INR goal: of 2.0-2.5  Duration of therapy: indefinite. Patient reports the following:   Adherent with regimen  Missed or extra doses:  None   Bleeding or thromboembolic side effects:  None  Significant medication changes:  None  Significant dietary changes: None  Significant alcohol or tobacco changes: None  Significant recent illness, disease state changes, or hospitalization:  None  Upcoming surgeries or procedures:  None  Falls: None           Assessment and Plan     PT/INR done in office per protocol. INR today is 1.9, subtherapeutic, but increasing at anticipated rate. Patient low on tablets. Plan: Will continue current regimen of warfarin 6mg daily except 4mg on MWF. ERx sent to pharmacy. Recheck INR in 4 days. Patient verbalized understanding of dosing directions and information discussed. Dosing schedule given to patient including phone number, appointment date, and time. Progress note sent to referring office. Patient acknowledges working in consult agreement with pharmacist as referred by his/her physician.       Electronically signed by Balbir Knight, [facility] on 9/9/21 at 12:12 PM EDT  For Pharmacy Admin Tracking Only     Intervention Detail: Refill(s) Provided   Total # of Interventions Recommended: 1   Total # of Interventions Accepted: 1   Time Spent (min): 15

## 2021-09-13 ENCOUNTER — ANTI-COAG VISIT (OUTPATIENT)
Dept: PHARMACY | Age: 36
End: 2021-09-13
Payer: COMMERCIAL

## 2021-09-13 DIAGNOSIS — G45.9 TIA (TRANSIENT ISCHEMIC ATTACK): ICD-10-CM

## 2021-09-13 DIAGNOSIS — I21.4 NSTEMI (NON-ST ELEVATED MYOCARDIAL INFARCTION) (HCC): Primary | ICD-10-CM

## 2021-09-13 LAB
INTERNATIONAL NORMALIZATION RATIO, POC: 2.9
POC INR: 2.9 INDEX
PROTHROMBIN TIME, POC: 34.5 SECONDS (ref 10–14.3)

## 2021-09-13 PROCEDURE — 99211 OFF/OP EST MAY X REQ PHY/QHP: CPT

## 2021-09-13 PROCEDURE — 36416 COLLJ CAPILLARY BLOOD SPEC: CPT

## 2021-09-13 PROCEDURE — 85610 PROTHROMBIN TIME: CPT

## 2021-09-13 NOTE — PROGRESS NOTES
Medication Management Service  [facility]  [phone]    Visit Date: 9/13/2021   Subjective:       Jennifer Salas is a 30 y.o. male who presents to clinic today for anticoagulation monitoring and adjustment. Patient seen in clinic for warfarin management due to  Indication:   CVA and TIA. INR goal: of 2.0-3.0. Duration of therapy: indefinite. Patient reports the following:   Adherent with regimen  Missed or extra doses:  None   Bleeding or thromboembolic side effects:  None  Significant medication changes:  None  Significant dietary changes: None  Significant alcohol or tobacco changes: None  Significant recent illness, disease state changes, or hospitalization:  None  Upcoming surgeries or procedures:  None  Falls: None           Assessment and Plan     PT/INR done in office per protocol. INR today is 2.9, supratherapeutic for current goal. Last 7 day total dose of 38mg due to booster doses. Next 7 day dose of 36mg and will expect INR to decrease. Will monitor closely. Plan: Will continue current regimen of warfarin 6mg daily except 4mg on MWF. Recheck INR in 1 week(s). Patient verbalized understanding of dosing directions and information discussed. Dosing schedule given to patient including phone number, appointment date, and time. Progress note sent to referring office. Patient acknowledges working in consult agreement with pharmacist as referred by his/her physician.       Electronically signed by Carolin Lennox, [facility] on 9/13/21 at 11:30 AM EDT    For Pharmacy [address] Intervention Detail:    Total # of Interventions Recommended:    Total # of Interventions Accepted:    Time Spent (min): 15

## 2021-09-15 ENCOUNTER — TELEPHONE (OUTPATIENT)
Dept: PHARMACY | Age: 36
End: 2021-09-15

## 2021-09-15 DIAGNOSIS — G45.9 TIA (TRANSIENT ISCHEMIC ATTACK): ICD-10-CM

## 2021-09-15 DIAGNOSIS — I21.4 NSTEMI (NON-ST ELEVATED MYOCARDIAL INFARCTION) (HCC): Primary | ICD-10-CM

## 2021-09-15 NOTE — TELEPHONE ENCOUNTER
Patient left message to call him regarding his warfarin dose. Returned call. He took 6mg on Monday instead of 4mg so then took 4mg on Tuesday instead of 6mg. Instructed him to resume schedule of 6mg daily except 4mg on MWF.    For Pharmacy Admin Tracking Only     Intervention Detail:    Total # of Interventions Recommended:    Total # of Interventions Accepted:    Time Spent (min): 5

## 2021-09-20 ENCOUNTER — ANTI-COAG VISIT (OUTPATIENT)
Dept: PHARMACY | Age: 36
End: 2021-09-20
Payer: COMMERCIAL

## 2021-09-20 DIAGNOSIS — I21.4 NSTEMI (NON-ST ELEVATED MYOCARDIAL INFARCTION) (HCC): Primary | ICD-10-CM

## 2021-09-20 DIAGNOSIS — G45.9 TIA (TRANSIENT ISCHEMIC ATTACK): ICD-10-CM

## 2021-09-20 LAB
INTERNATIONAL NORMALIZATION RATIO, POC: 2.1
POC INR: 2.1 INDEX
PROTHROMBIN TIME, POC: 24.6 SECONDS (ref 10–14.3)

## 2021-09-20 PROCEDURE — 85610 PROTHROMBIN TIME: CPT

## 2021-09-20 PROCEDURE — 99211 OFF/OP EST MAY X REQ PHY/QHP: CPT

## 2021-09-20 PROCEDURE — 36416 COLLJ CAPILLARY BLOOD SPEC: CPT

## 2021-09-21 ENCOUNTER — OFFICE VISIT (OUTPATIENT)
Dept: CARDIOLOGY CLINIC | Age: 36
End: 2021-09-21
Payer: COMMERCIAL

## 2021-09-21 VITALS
HEART RATE: 79 BPM | DIASTOLIC BLOOD PRESSURE: 66 MMHG | SYSTOLIC BLOOD PRESSURE: 100 MMHG | WEIGHT: 181.2 LBS | OXYGEN SATURATION: 98 % | HEIGHT: 70 IN | BODY MASS INDEX: 25.94 KG/M2

## 2021-09-21 DIAGNOSIS — R47.01 APHASIA: ICD-10-CM

## 2021-09-21 DIAGNOSIS — I25.5 ISCHEMIC CARDIOMYOPATHY: ICD-10-CM

## 2021-09-21 DIAGNOSIS — G45.9 TIA (TRANSIENT ISCHEMIC ATTACK): Primary | ICD-10-CM

## 2021-09-21 DIAGNOSIS — I21.4 NSTEMI (NON-ST ELEVATED MYOCARDIAL INFARCTION) (HCC): ICD-10-CM

## 2021-09-21 PROCEDURE — 99214 OFFICE O/P EST MOD 30 MIN: CPT | Performed by: INTERNAL MEDICINE

## 2021-09-21 RX ORDER — METOPROLOL SUCCINATE 100 MG/1
100 TABLET, EXTENDED RELEASE ORAL DAILY
Qty: 30 TABLET | Refills: 3 | Status: SHIPPED | OUTPATIENT
Start: 2021-09-21 | End: 2021-10-19 | Stop reason: SDUPTHER

## 2021-09-21 RX ORDER — VALACYCLOVIR HYDROCHLORIDE 500 MG/1
500 TABLET, FILM COATED ORAL NIGHTLY
Qty: 90 TABLET | Refills: 3 | Status: SHIPPED
Start: 2021-09-21 | End: 2021-11-08 | Stop reason: ALTCHOICE

## 2021-09-21 RX ORDER — B12/LEVOMEFOLATE CALCIUM/B-6 2-1.13-25
1 TABLET ORAL DAILY
Qty: 90 TABLET | Refills: 3 | Status: SHIPPED | OUTPATIENT
Start: 2021-09-21 | End: 2021-10-25 | Stop reason: SDUPTHER

## 2021-09-21 RX ORDER — LISINOPRIL 20 MG/1
20 TABLET ORAL DAILY
Qty: 30 TABLET | Refills: 3 | Status: SHIPPED | OUTPATIENT
Start: 2021-09-21 | End: 2021-10-25

## 2021-09-21 RX ORDER — ASPIRIN 81 MG/1
81 TABLET, CHEWABLE ORAL DAILY
Qty: 90 TABLET | Refills: 3 | Status: SHIPPED | OUTPATIENT
Start: 2021-09-21 | End: 2022-07-26 | Stop reason: SDUPTHER

## 2021-09-21 RX ORDER — WARFARIN SODIUM 2 MG/1
6 TABLET ORAL DAILY
Qty: 80 TABLET | Refills: 0 | Status: CANCELLED | OUTPATIENT
Start: 2021-09-21

## 2021-09-21 RX ORDER — ATORVASTATIN CALCIUM 80 MG/1
80 TABLET, FILM COATED ORAL NIGHTLY
Qty: 90 TABLET | Refills: 3 | Status: SHIPPED | OUTPATIENT
Start: 2021-09-21 | End: 2022-10-24

## 2021-09-21 RX ORDER — SPIRONOLACTONE 25 MG/1
25 TABLET ORAL DAILY
Qty: 90 TABLET | Refills: 3 | Status: SHIPPED | OUTPATIENT
Start: 2021-09-21 | End: 2021-10-19 | Stop reason: SDUPTHER

## 2021-09-21 NOTE — PROGRESS NOTES
CARDIOLOGY  NOTE    Chief Complaint: CHF/ cardiomyopathy /ASCVD    HPI:   Yan Heart is a 30y.o. year old who has Past medical history as noted below. He was hospitalized after recent CVA causing aphasia but is recovered from his CVA. During work-up he was also found to have severe cardiomyopathy with ejection fraction of 20% with akinetic apex and septal wall. Cardiac cath revealed occluded LAD. Viability study shows infarcted apex but viable anterior wall. HE is going to  Dr Dayanara Nickerson for PCP soon   He is tolerating the meds well HE is not dizzy or lightheaded . He is trying to wear life vest but it gives him a lot of anxiety and keeps him up often at night   He says his breathing is okay he is tolerating meds he was started on guideline recommended medical therapy he has no ankle swelling or shortness of breath he was started on Coumadin due to concerns for possible embolic event from dilated cardiomyopathy. 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 Current Outpatient Medications   Medication Sig Dispense Refill    aspirin 81 MG chewable tablet Take 1 tablet by mouth daily 90 tablet 3    atorvastatin (LIPITOR) 80 MG tablet Take 1 tablet by mouth nightly 90 tablet 3    folic acid-pyridoxine-cyancobalamin (FOLTX) 1.13-25-2 MG TABS Take 1 tablet by mouth daily 90 tablet 3    lisinopril (PRINIVIL;ZESTRIL) 20 MG tablet Take 1 tablet by mouth daily HOLD FOR SBP LESS THAN 90 AND HR LESS THAN 60 30 tablet 3    metoprolol succinate (TOPROL XL) 100 MG extended release tablet Take 1 tablet by mouth daily Do not crush or chew.   HOLD FOR SBP LESS THAN 90 AND HR LESS THAN 60 30 tablet 3    spironolactone (ALDACTONE) 25 MG tablet Take 1 tablet by mouth daily 90 tablet 3    valACYclovir (VALTREX) 500 MG tablet Take 1 tablet by mouth nightly 90 tablet 3    warfarin (COUMADIN) 2 MG tablet Take 3 appearance. HENT:  Normocephalic, Atraumatic, Bilateral external ears normal, Oropharynx moist, No oral exudates, Nose normal. Neck- Normal range of motion, No tenderness, Supple, No stridor,no apical-carotid delay  Eyes:  PERRL, EOMI, Conjunctiva normal, No discharge. Respiratory:  Normal breath sounds, No respiratory distress, No wheezing, No chest tenderness. ,no use of accessory muscles, NO crackles  Cardiovascular: (PMI) apex non displaced,no lifts no thrills,S1 and S2 audible, No added heart sounds, No signs of ankle edema, or volume overload, No evidence of JVD, No crackles  GI:  Bowel sounds normal, Soft, No tenderness, No masses, No gross visceromegaly   :  No costovertebral angle tenderness   Musculoskeletal:  No edema, no tenderness, no deformities. Back- no tenderness  Integument:  Well hydrated, no rash   Lymphatic:  No lymphadenopathy noted   Neurologic:  Alert & oriented x 3, CN 2-12 normal, normal motor function, normal sensory function, no focal deficits noted   Psychiatric:  Speech and behavior appropriate       Medical decision making and Data review:  DATA:  Lab Results   Component Value Date    TROPONINT 2.320 ([facility]) 08/22/2021     BNP:  No results found for: PROBNP  PT/INR:  No results found for: PTINR  No results found for: LABA1C  Lab Results   Component Value Date    CHOL 149 08/22/2021    TRIG 148 08/22/2021    HDL 28 (L) 08/22/2021    LDLDIRECT 94 08/22/2021     Lab Results   Component Value Date    ALT 30 08/22/2021    AST 30 08/22/2021     No results for input(s): WBC, HGB, HCT, MCV, PLT in the last 72 hours.   TSH: No results found for: TSH  Lab Results   Component Value Date    AST 30 08/22/2021    ALT 30 08/22/2021    BILITOT 0.4 08/22/2021    ALKPHOS 95 08/22/2021     No results found for: PROBNP  No results found for: LABA1C  Lab Results   Component Value Date    WBC 9.9 08/26/2021    HGB 16.2 08/26/2021    HCT 51.2 08/26/2021     08/26/2021     Cath 8/23/21  LMCA: Normal.     LAD: Abnormal.100% occluded prox LAD with Collaterals from Rt filling the LAD     LCx: Moderate Lumen Irregularity. Right dominance noted. OM1 prox 50% stenosis     RCA: Normal.Right dominance noted. Rt to Left Collaterals noted to LAD territory     Echo 8/23/21   Summary   Left ventricular systolic function is abnormal.   Ejection fraction is visually estimated at <20%. Apical, septal, and anterior wall segments are akinetic. Apical ballooning noted. Severly dilated left ventricle measuring 7.0cm in diastole. No evidence of any pericardial effusion. Repeat study with Definity to rule out LV thrombus             All labs, medications and tests reviewed by myself including data and history from outside source , patient and available family . Assessment & Plan:      1. TIA (transient ischemic attack)    2. Ischemic cardiomyopathy    3. Aphasia    4. NSTEMI (non-ST elevated myocardial infarction) ([address]         Ischemic cardiomyopathy   History of 20% wearing LifeVest continue to titrate up medication increase Toprol- mg daily and increase  lisinopril 20 mg daily. Anterior wall is viable will debate LAD intervention after discussing with  team.  Reevaluate in 3 month debate ICD in 3 months  Will refer to Dr Aida Butler for possible  intervention     TIA (transient ischemic attack)   Acute CVA probably secondary to embolic event due to LV function possible source of embolic event continue Coumadin keep INR between 2-2.5 referred to Coumadin clinic    Cerebrovascular accident (CVA) due to embolism [address] [address]  Continue Coumadin    Cysticercosis  Evaluated by ID      Dyslipidemia :  All available lab work was reviewed. Patient was advised to repeat lab work before next visit. Necessary orders were placed , instructions given by myself       Counseled extensively and medication compliance urged. We discussed that for the  prevention of ASCVD our  goal is aggressive risk modification. Patient is encouraged to exercise if they can , educated about  brisk walk for 30 minutes  at least 3 to 4 times a week if there are no physical limitations  Various goals were discussed and questions answered. Continue current medications. Appropriate prescriptions are addressed and refills ordered. Questions answered and patient verbalizes understanding. Call for any problems, questions, or concerns. Greater than 60 % of time spent counseling besides reviewing data and images     Continue all other medications of all above medical condition listed as is. Return in about 1 month (around 10/21/2021). Please note this report has been partially produced using speech recognition software and may contain errors related to that system including errors in grammar, punctuation, and spelling, as well as words and phrases that may be inappropriate. If there are any questions or concerns please feel free to contact the dictating provider for clarification.

## 2021-09-24 DIAGNOSIS — I25.82 CHRONIC TOTAL OCCLUSION OF CORONARY ARTERY: Primary | ICD-10-CM

## 2021-09-27 ENCOUNTER — ANTI-COAG VISIT (OUTPATIENT)
Dept: PHARMACY | Age: 36
End: 2021-09-27
Payer: COMMERCIAL

## 2021-09-27 DIAGNOSIS — I21.4 NSTEMI (NON-ST ELEVATED MYOCARDIAL INFARCTION) (HCC): Primary | ICD-10-CM

## 2021-09-27 DIAGNOSIS — G45.9 TIA (TRANSIENT ISCHEMIC ATTACK): ICD-10-CM

## 2021-09-27 LAB
INTERNATIONAL NORMALIZATION RATIO, POC: 2.3
POC INR: 2.3 INDEX
PROTHROMBIN TIME, POC: 27.8 SECONDS (ref 10–14.3)

## 2021-09-27 PROCEDURE — 36416 COLLJ CAPILLARY BLOOD SPEC: CPT

## 2021-09-27 PROCEDURE — 85610 PROTHROMBIN TIME: CPT

## 2021-09-27 PROCEDURE — 99212 OFFICE O/P EST SF 10 MIN: CPT

## 2021-09-27 RX ORDER — WARFARIN SODIUM 2 MG/1
6 TABLET ORAL DAILY
Qty: 84 TABLET | Refills: 0 | Status: SHIPPED | OUTPATIENT
Start: 2021-09-27 | End: 2021-10-06

## 2021-09-27 NOTE — PROGRESS NOTES
Medication Management Service  [facility]  [phone]    Visit Date: 9/27/2021   Subjective:       Yonathan Aragon is a 30 y.o. male who presents to clinic today for anticoagulation monitoring and adjustment. Patient seen in clinic for warfarin management due to  Indication:   CVA, MI and TIA. INR goal: of 2.0-2.5. Duration of therapy: indefinite. Patient reports the following:   Adherent with regimen  Missed or extra doses:  None   Bleeding or thromboembolic side effects:  None  Significant medication changes:  None  Significant dietary changes: None  Significant alcohol or tobacco changes: None  Significant recent illness, disease state changes, or hospitalization:  None  Upcoming surgeries or procedures:  None  Falls: None           Assessment and Plan     PT/INR done in office per protocol. INR today is 2.3, therapeutic. Plan: Will continue current regimen of warfarin 6mg daily except 4mg on MWF. Recheck INR in 2 week(s). Patient verbalized understanding of dosing directions and information discussed. Dosing schedule given to patient including phone number, appointment date, and time. Progress note sent to referring office. Patient acknowledges working in consult agreement with pharmacist as referred by his/her physician.       Electronically signed by Sergo Bermudez, [address] [place] [address] on 9/27/21 at 10:32 AM EDT    For Pharmacy Admin Tracking Only     Intervention Detail: Refill(s) Provided   Total # of Interventions Recommended: 1   Total # of Interventions Accepted: 1   Time Spent (min): 15

## 2021-10-06 RX ORDER — WARFARIN SODIUM 2 MG/1
TABLET ORAL
Qty: 79 TABLET | Refills: 3 | Status: SHIPPED | OUTPATIENT
Start: 2021-10-06 | End: 2022-01-27 | Stop reason: SDUPTHER

## 2021-10-11 ENCOUNTER — ANTI-COAG VISIT (OUTPATIENT)
Dept: PHARMACY | Age: 36
End: 2021-10-11
Payer: COMMERCIAL

## 2021-10-11 DIAGNOSIS — G45.9 TIA (TRANSIENT ISCHEMIC ATTACK): ICD-10-CM

## 2021-10-11 DIAGNOSIS — I21.4 NSTEMI (NON-ST ELEVATED MYOCARDIAL INFARCTION) (HCC): Primary | ICD-10-CM

## 2021-10-11 LAB
INTERNATIONAL NORMALIZATION RATIO, POC: 1.5
POC INR: 1.5 INDEX
PROTHROMBIN TIME, POC: 17.9 SECONDS (ref 10–14.3)

## 2021-10-11 PROCEDURE — 85610 PROTHROMBIN TIME: CPT

## 2021-10-11 PROCEDURE — 36416 COLLJ CAPILLARY BLOOD SPEC: CPT

## 2021-10-11 PROCEDURE — 99212 OFFICE O/P EST SF 10 MIN: CPT

## 2021-10-11 NOTE — PROGRESS NOTES
Medication Management Service  [facility]  [phone]    Visit Date: 10/11/2021   Subjective:       Ruperto Wilson is a 30 y.o. male who presents to clinic today for anticoagulation monitoring and adjustment. Patient seen in clinic for warfarin management due to  Indication:   CVA and TIA. INR goal: of 2.0-3.0. Duration of therapy: indefinite. Patient reports the following:   Adherent with regimen  Missed or extra doses:  None   Bleeding or thromboembolic side effects:  None  Significant medication changes:  None  Significant dietary changes: None  Significant alcohol or tobacco changes: None  Significant recent illness, disease state changes, or hospitalization:  None  Upcoming surgeries or procedures:  None  Falls: None           Assessment and Plan     PT/INR done in office per protocol. INR today is 1.5, subtherapeutic. Patient reports a 4mg and 6mg day were switched, but overall dose the same. Plan:  Increase weekly dose ~11% to warfarin 6mg daily except 4mg on Thursdays. .     Recheck INR in 1 week(s). Patient verbalized understanding of dosing directions and information discussed. Dosing schedule given to patient including phone number, appointment date, and time. Progress note sent to referring office. Patient acknowledges working in consult agreement with pharmacist as referred by his/her physician.       Electronically signed by Clarissa Chirinos [facility] on 10/11/21 at 12:17 PM EDT    For Pharmacy Admin Tracking Only     Intervention Detail: Dose Adjustment: 1, reason: Therapy Optimization   Total # of Interventions Recommended: 1   Total # of Interventions Accepted: 1   Time Spent (min): 15

## 2021-10-18 ENCOUNTER — ANTI-COAG VISIT (OUTPATIENT)
Dept: PHARMACY | Age: 36
End: 2021-10-18
Payer: COMMERCIAL

## 2021-10-18 DIAGNOSIS — I21.4 NSTEMI (NON-ST ELEVATED MYOCARDIAL INFARCTION) (HCC): Primary | ICD-10-CM

## 2021-10-18 DIAGNOSIS — G45.9 TIA (TRANSIENT ISCHEMIC ATTACK): ICD-10-CM

## 2021-10-18 LAB
INR BLD: 2.1
POC INR: 2.1 INDEX
PROTHROMBIN TIME, POC: 25 SECONDS (ref 10–14.3)
PROTIME: 25 SECONDS

## 2021-10-18 PROCEDURE — 99211 OFF/OP EST MAY X REQ PHY/QHP: CPT

## 2021-10-18 PROCEDURE — 36416 COLLJ CAPILLARY BLOOD SPEC: CPT

## 2021-10-18 PROCEDURE — 85610 PROTHROMBIN TIME: CPT

## 2021-10-18 NOTE — PROGRESS NOTES
Medication Management Service  [facility][1]  [phone]    Visit Date: 10/18/2021   Subjective:       Ruperto Wilson is a 30 y.o. male who presents to clinic today for anticoagulation monitoring and adjustment. Patient seen in clinic for warfarin management due to  Indication:   CVA and TIA. INR goal: of 2.0 - 2.5. Duration of therapy: indefinite. Patient reports the following:   Adherent with regimen  Missed or extra doses:  None   Bleeding or thromboembolic side effects:  Minor bleeding from gums  Significant medication changes:  None  Significant dietary changes: None  Significant alcohol or tobacco changes: None  Significant recent illness, disease state changes, or hospitalization:  None  Upcoming surgeries or procedures:  None  Falls: None           Assessment and Plan     PT/INR done in office per protocol. INR today is 2.1, therapeutic. Plan: Will continue current regimen of warfarin 6mg daily except 4mg Thursdays. Recheck INR in 1 week(s). Patient verbalized understanding of dosing directions and information discussed. Dosing schedule given to patient including phone number, appointment date, and time. Progress note sent to referring office. Patient acknowledges working in consult agreement with pharmacist as referred by his/her physician.       Electronically signed by QUETA Brown JOSÉ MIGUEL [facility] on 10/18/21 at 12:32 PM EDT    For Pharmacy Admin Tracking Only     Total # of Interventions Recommended: 0   Total # of Interventions Accepted: 0   Time Spent (min): 15

## 2021-10-19 RX ORDER — SPIRONOLACTONE 25 MG/1
25 TABLET ORAL DAILY
Qty: 90 TABLET | Refills: 3 | Status: SHIPPED | OUTPATIENT
Start: 2021-10-19 | End: 2021-10-20 | Stop reason: SDUPTHER

## 2021-10-19 RX ORDER — METOPROLOL SUCCINATE 100 MG/1
100 TABLET, EXTENDED RELEASE ORAL DAILY
Qty: 90 TABLET | Refills: 3 | Status: SHIPPED | OUTPATIENT
Start: 2021-10-19 | End: 2021-10-21 | Stop reason: SDUPTHER

## 2021-10-20 RX ORDER — SPIRONOLACTONE 25 MG/1
25 TABLET ORAL DAILY
Qty: 90 TABLET | Refills: 3 | Status: SHIPPED | OUTPATIENT
Start: 2021-10-20 | End: 2022-06-07 | Stop reason: SDUPTHER

## 2021-10-21 ENCOUNTER — TELEPHONE (OUTPATIENT)
Dept: CARDIOLOGY CLINIC | Age: 36
End: 2021-10-21

## 2021-10-21 RX ORDER — METOPROLOL SUCCINATE 100 MG/1
100 TABLET, EXTENDED RELEASE ORAL DAILY
Qty: 30 TABLET | Refills: 5 | Status: SHIPPED | OUTPATIENT
Start: 2021-10-21 | End: 2021-10-22 | Stop reason: SDUPTHER

## 2021-10-22 RX ORDER — METOPROLOL SUCCINATE 100 MG/1
100 TABLET, EXTENDED RELEASE ORAL DAILY
Qty: 30 TABLET | Refills: 5 | Status: SHIPPED | OUTPATIENT
Start: 2021-10-22 | End: 2021-10-25

## 2021-10-25 ENCOUNTER — ANTI-COAG VISIT (OUTPATIENT)
Dept: PHARMACY | Age: 36
End: 2021-10-25
Payer: COMMERCIAL

## 2021-10-25 ENCOUNTER — OFFICE VISIT (OUTPATIENT)
Dept: CARDIOLOGY CLINIC | Age: 36
End: 2021-10-25
Payer: COMMERCIAL

## 2021-10-25 VITALS
SYSTOLIC BLOOD PRESSURE: 118 MMHG | DIASTOLIC BLOOD PRESSURE: 60 MMHG | HEIGHT: 70 IN | BODY MASS INDEX: 26.2 KG/M2 | HEART RATE: 72 BPM | WEIGHT: 183 LBS

## 2021-10-25 DIAGNOSIS — I25.5 ISCHEMIC CARDIOMYOPATHY: ICD-10-CM

## 2021-10-25 DIAGNOSIS — I21.4 NSTEMI (NON-ST ELEVATED MYOCARDIAL INFARCTION) (HCC): ICD-10-CM

## 2021-10-25 DIAGNOSIS — G45.9 TIA (TRANSIENT ISCHEMIC ATTACK): ICD-10-CM

## 2021-10-25 DIAGNOSIS — I21.4 NSTEMI (NON-ST ELEVATED MYOCARDIAL INFARCTION) (HCC): Primary | ICD-10-CM

## 2021-10-25 DIAGNOSIS — R47.01 APHASIA: ICD-10-CM

## 2021-10-25 DIAGNOSIS — I63.429 CEREBROVASCULAR ACCIDENT (CVA) DUE TO EMBOLISM OF ANTERIOR CEREBRAL ARTERY, UNSPECIFIED BLOOD VESSEL LATERALITY (HCC): Primary | ICD-10-CM

## 2021-10-25 LAB
INTERNATIONAL NORMALIZATION RATIO, POC: 2.8
POC INR: 2.8 INDEX
PROTHROMBIN TIME, POC: 33 SECONDS (ref 10–14.3)

## 2021-10-25 PROCEDURE — 99214 OFFICE O/P EST MOD 30 MIN: CPT | Performed by: INTERNAL MEDICINE

## 2021-10-25 PROCEDURE — 36416 COLLJ CAPILLARY BLOOD SPEC: CPT

## 2021-10-25 PROCEDURE — 99211 OFF/OP EST MAY X REQ PHY/QHP: CPT

## 2021-10-25 PROCEDURE — 85610 PROTHROMBIN TIME: CPT

## 2021-10-25 RX ORDER — B12/LEVOMEFOLATE CALCIUM/B-6 2-1.13-25
1 TABLET ORAL DAILY
Qty: 90 TABLET | Refills: 3 | Status: SHIPPED | OUTPATIENT
Start: 2021-10-25 | End: 2022-07-26 | Stop reason: SDUPTHER

## 2021-10-25 RX ORDER — LISINOPRIL 40 MG/1
40 TABLET ORAL DAILY
Qty: 90 TABLET | Refills: 3 | Status: SHIPPED | OUTPATIENT
Start: 2021-10-25 | End: 2022-03-21 | Stop reason: SDUPTHER

## 2021-10-25 RX ORDER — METOPROLOL SUCCINATE 100 MG/1
100 TABLET, EXTENDED RELEASE ORAL DAILY
Qty: 90 TABLET | Refills: 3 | Status: SHIPPED | OUTPATIENT
Start: 2021-10-25 | End: 2022-03-21

## 2021-10-25 RX ORDER — LISINOPRIL 10 MG/1
TABLET ORAL
COMMUNITY
Start: 2021-09-28 | End: 2021-11-29 | Stop reason: ALTCHOICE

## 2021-10-25 NOTE — PROGRESS NOTES
Medication Management Service  [facility]  [phone]    Visit Date: 10/25/2021   Subjective:       Ilia Delaney is a 30 y.o. male who presents to clinic today for anticoagulation monitoring and adjustment. Patient seen in clinic for warfarin management due to  Indication:   CVA and TIA. INR goal: of 2.0-3.0. Duration of therapy: indefinite. Patient reports the following:   Adherent with regimen  Missed or extra doses:  None   Bleeding or thromboembolic side effects:  Gum bleeding in morning only, stops quickly  Significant medication changes:  None  Significant dietary changes: None  Significant alcohol or tobacco changes: None  Significant recent illness, disease state changes, or hospitalization:  None  Upcoming surgeries or procedures:  None  Falls: None           Assessment and Plan     PT/INR done in office per protocol. INR today is 2.8, therapeutic. Plan: Will continue current regimen of warfarin 6mg daily except 4mg on Thursdays. Recheck INR in 2 week(s). Patient verbalized understanding of dosing directions and information discussed. Dosing schedule given to patient including phone number, appointment date, and time. Progress note sent to referring office. Patient acknowledges working in consult agreement with pharmacist as referred by his/her physician.       Electronically signed by Dayanara Rosario [facility] on 10/25/21 at 10:46 AM EDT    For Pharmacy Admin Tracking Only     Intervention Detail:    Total # of Interventions Recommended:    Total # of Interventions Accepted:    Time Spent (min): 15

## 2021-10-25 NOTE — PROGRESS NOTES
CARDIOLOGY  NOTE    Chief Complaint: CHF/ cardiomyopathy /ASCVD    HPI:   Ludmila Gross is a 30y.o. year old who has Past medical history as noted below. Ludmila Gross has not heard from Dr Kofi Singh for LAD  intervention as yet he is still being cautious and is avoiding stress he is working form home   He was hospitalized in Aug 2021  after recent CVA causing aphasia but is recovered from his CVA. During work-up he was also found to have severe cardiomyopathy with ejection fraction of 20% with akinetic apex and septal wall. Cardiac cath revealed occluded LAD. Viability study shows infarcted apex but viable anterior wall. HE is going to se Dr Terri Rueda for PCP soon   He is tolerating the meds well HE is not dizzy or lightheaded . He is trying to wear life vest but it gives him a lot of anxiety and keeps him up often at night   He says his breathing is okay he is tolerating meds he was started on guideline recommended medical therapy he has no ankle swelling or shortness of breath he was started on Coumadin due to concerns for possible embolic event from dilated cardiomyopathy. 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 Current Outpatient Medications   Medication Sig Dispense Refill    folic acid-pyridoxine-cyancobalamin (FOLTX) 1.13-25-2 MG TABS Take 1 tablet by mouth daily 90 tablet 3    lisinopril (PRINIVIL;ZESTRIL) 40 MG tablet Take 1 tablet by mouth daily HOLD FOR SBP LESS THAN 90 AND HR LESS THAN 60 90 tablet 3    metoprolol succinate (TOPROL XL) 100 MG extended release tablet Take 1 tablet by mouth daily Do not crush or chew.  HOLD FOR SBP LESS THAN 90 AND HR LESS THAN 60 90 tablet 3    spironolactone (ALDACTONE) 25 MG tablet Take 1 tablet by mouth daily 90 tablet 3    warfarin (COUMADIN) 2 MG tablet TAKE THREE TABLETS BY MOUTH DAILY, EXCEPT TAKE TWO TABLETS DAILY ON MONDAYS, WEDNESDAYS, AND , OR AS DIRECTED. 79 tablet 3    aspirin 81 MG chewable tablet Take 1 tablet by mouth daily 90 tablet 3    atorvastatin (LIPITOR) 80 MG tablet Take 1 tablet by mouth nightly 90 tablet 3    valACYclovir (VALTREX) 500 MG tablet Take 1 tablet by mouth nightly 90 tablet 3    lisinopril (PRINIVIL;ZESTRIL) 10 MG tablet  (Patient not taking: Reported on 10/25/2021)       No current facility-administered medications for this visit. Allergies:   Patient has no known allergies. Patient History:  History reviewed. No pertinent past medical history. History reviewed. No pertinent surgical history. History reviewed. No pertinent family history. Social History     Tobacco Use    Smoking status: Former Smoker     Packs/day: 0.50     Start date: 2021     Quit date: 2021     Years since quittin.1    Smokeless tobacco: Never Used   Substance Use Topics    Alcohol use: Not on file        Review of Systems:   · Constitutional: No Fever or Weight Loss   · Eyes: No Decreased Vision  · ENT: No Headaches, Hearing Loss or Vertigo  · Cardiovascular: as per note above   · Respiratory: No cough or wheezing and as per note above.    · Gastrointestinal: No abdominal pain, appetite loss, blood in stools, constipation, diarrhea or heartburn  · Genitourinary: No dysuria, trouble voiding, or hematuria  · Musculoskeletal:  denies any new  joint aches , swelling  or pain   · Integumentary: No rash or pruritis  · Neurological: No TIA or stroke symptoms  · Psychiatric: No anxiety or depression  · Endocrine: No malaise, fatigue or temperature intolerance  · Hematologic/Lymphatic: No bleeding problems, blood clots or swollen lymph nodes  · Allergic/Immunologic: No nasal congestion or hives    Objective:      Physical Exam:  /60   Pulse 72   Ht 5' 10\" (1.778 m)   Wt 183 lb (83 kg)   BMI 26.26 kg/m²   Wt Readings from Last 3 Encounters:   10/25/21 183 lb (83 kg)   21 181 lb 3.2 oz (82.2 kg)   21 189 lb (85.7 kg)     Body mass index is 26.26 kg/m². Vitals:    10/25/21 1112   BP: 118/60   Pulse: 72        General Appearance:  No distress, conversant  Constitutional:  Well developed, Well nourished, No acute distress, Non-toxic appearance. HENT:  Normocephalic, Atraumatic, Bilateral external ears normal, Oropharynx moist, No oral exudates, Nose normal. Neck- Normal range of motion, No tenderness, Supple, No stridor,no apical-carotid delay  Eyes:  PERRL, EOMI, Conjunctiva normal, No discharge. Respiratory:  Normal breath sounds, No respiratory distress, No wheezing, No chest tenderness. ,no use of accessory muscles, NO crackles  Cardiovascular: (PMI) apex non displaced,no lifts no thrills,S1 and S2 audible, No added heart sounds, No signs of ankle edema, or volume overload, No evidence of JVD, No crackles  GI:  Bowel sounds normal, Soft, No tenderness, No masses, No gross visceromegaly   :  No costovertebral angle tenderness   Musculoskeletal:  No edema, no tenderness, no deformities. Back- no tenderness  Integument:  Well hydrated, no rash   Lymphatic:  No lymphadenopathy noted   Neurologic:  Alert & oriented x 3, CN 2-12 normal, normal motor function, normal sensory function, no focal deficits noted   Psychiatric:  Speech and behavior appropriate       Medical decision making and Data review:  DATA:  Lab Results   Component Value Date    TROPONINT 2.320 ([facility]) 08/22/2021     BNP:  No results found for: PROBNP  PT/INR:  No results found for: PTINR  No results found for: LABA1C  Lab Results   Component Value Date    CHOL 149 08/22/2021    TRIG 148 08/22/2021    HDL 28 (L) 08/22/2021    LDLDIRECT 94 08/22/2021     Lab Results   Component Value Date    ALT 30 08/22/2021    AST 30 08/22/2021     No results for input(s): WBC, HGB, HCT, MCV, PLT in the last 72 hours.   TSH: No results found for: TSH  Lab Results   Component Value Date    AST 30 08/22/2021    ALT 30 08/22/2021    BILITOT 0.4 08/22/2021    ALKPHOS 95 08/22/2021     No results found for: PROBNP  No results found for: LABA1C  Lab Results   Component Value Date    WBC 9.9 08/26/2021    HGB 16.2 08/26/2021    HCT 51.2 08/26/2021     08/26/2021     Cath 8/23/21  LMCA: Normal.     LAD: Abnormal.100% occluded prox LAD with Collaterals from Rt filling the LAD     LCx: Moderate Lumen Irregularity. Right dominance noted. OM1 prox 50% stenosis     RCA: Normal.Right dominance noted. Rt to Left Collaterals noted to LAD territory     Echo 8/23/21   Summary   Left ventricular systolic function is abnormal.   Ejection fraction is visually estimated at <20%. Apical, septal, and anterior wall segments are akinetic. Apical ballooning noted. Severly dilated left ventricle measuring 7.0cm in diastole. No evidence of any pericardial effusion. Repeat study with Definity to rule out LV thrombus             All labs, medications and tests reviewed by myself including data and history from outside source , patient and available family . Assessment & Plan:      1. Cerebrovascular accident (CVA) due to embolism of anterior cerebral artery, unspecified blood vessel laterality ([address]    2. Ischemic cardiomyopathy    3. TIA (transient ischemic attack)    4. Aphasia    5. NSTEMI (non-ST elevated myocardial infarction) ([address]         Ischemic cardiomyopathy   History of 20% wearing LifeVest continue to titrate up medication increase Toprol- mg daily and increase  lisinopril 20 mg daily.   Anterior wall is viable will debate LAD intervention after discussing with  team.  Reevaluate in 3 month debate ICD by Dec 2021   Will refer to Dr Cristo Lantigua for possible  intervention   I spoke and sent all his messages to his cardiologist friend in [place]     TIA (transient ischemic attack)   Acute CVA probably secondary to embolic event due to LV function possible source of embolic event continue Coumadin keep INR between 2-2.5 referred to Coumadin clinic    Cerebrovascular accident ([address]A) due to embolism ([facility]  Continue Coumadin    Cysticercosis  Evaluated by ID      Dyslipidemia :  All available lab work was reviewed. Patient was advised to repeat lab work before next visit. Necessary orders were placed , instructions given by myself       Counseled extensively and medication compliance urged. We discussed that for the  prevention of ASCVD our  goal is aggressive risk modification. Patient is encouraged to exercise if they can , educated about  brisk walk for 30 minutes  at least 3 to 4 times a week if there are no physical limitations  Various goals were discussed and questions answered. Continue current medications. Appropriate prescriptions are addressed and refills ordered. Questions answered and patient verbalizes understanding. Call for any problems, questions, or concerns. Greater than 60 % of time spent counseling besides reviewing data and images     Continue all other medications of all above medical condition listed as is. Return in about 1 month (around 11/25/2021). Please note this report has been partially produced using speech recognition software and may contain errors related to that system including errors in grammar, punctuation, and spelling, as well as words and phrases that may be inappropriate. If there are any questions or concerns please feel free to contact the dictating provider for clarification.

## 2021-11-08 ENCOUNTER — ANTI-COAG VISIT (OUTPATIENT)
Dept: PHARMACY | Age: 36
End: 2021-11-08
Payer: COMMERCIAL

## 2021-11-08 DIAGNOSIS — G45.9 TIA (TRANSIENT ISCHEMIC ATTACK): ICD-10-CM

## 2021-11-08 DIAGNOSIS — I21.4 NSTEMI (NON-ST ELEVATED MYOCARDIAL INFARCTION) (HCC): Primary | ICD-10-CM

## 2021-11-08 LAB
INTERNATIONAL NORMALIZATION RATIO, POC: 2.5
POC INR: 2.5 INDEX
PROTHROMBIN TIME, POC: 29.7 SECONDS (ref 10–14.3)

## 2021-11-08 PROCEDURE — 99211 OFF/OP EST MAY X REQ PHY/QHP: CPT

## 2021-11-08 PROCEDURE — 85610 PROTHROMBIN TIME: CPT

## 2021-11-08 PROCEDURE — 36416 COLLJ CAPILLARY BLOOD SPEC: CPT

## 2021-11-08 NOTE — PROGRESS NOTES
Medication Management Service  [place]  [phone]    Visit Date: 11/8/2021   Subjective:       Conrad Rendon is a [address] y.o. male who presents to clinic today for anticoagulation monitoring and adjustment. Patient seen in clinic for warfarin management due to  Indication:   CVA and TIA. INR goal: of 2.0-3.0. Duration of therapy: indefinite. Patient reports the following:   Adherent with regimen  Missed or extra doses:  None   Bleeding or thromboembolic side effects:  None  Significant medication changes:  None  Significant dietary changes: None  Significant alcohol or tobacco changes: None  Significant recent illness, disease state changes, or hospitalization:  None  Upcoming surgeries or procedures:  None  Falls: None           Assessment and Plan     PT/INR done in office per protocol. INR today is 2.5, therapeutic. Plan: Will continue current regimen of warfarin 6mg daily except 4mg on Thursdays. Recheck INR in 3 week(s). Patient verbalized understanding of dosing directions and information discussed. Dosing schedule given to patient including phone number, appointment date, and time. Progress note sent to referring office. Patient acknowledges working in consult agreement with pharmacist as referred by his/her physician.       Electronically signed by Mallory Pérez [facility] on 11/8/21 at 11:07 AM EST    For Pharmacy Admin Tracking Only     Intervention Detail:    Total # of Interventions Recommended:    Total # of Interventions Accepted:    Time Spent (min): 15

## 2021-11-16 ENCOUNTER — TELEPHONE (OUTPATIENT)
Dept: CARDIOLOGY CLINIC | Age: 36
End: 2021-11-16

## 2021-11-29 ENCOUNTER — ANTI-COAG VISIT (OUTPATIENT)
Dept: PHARMACY | Age: 36
End: 2021-11-29
Payer: COMMERCIAL

## 2021-11-29 ENCOUNTER — TELEPHONE (OUTPATIENT)
Dept: CARDIOLOGY CLINIC | Age: 36
End: 2021-11-29

## 2021-11-29 ENCOUNTER — OFFICE VISIT (OUTPATIENT)
Dept: CARDIOLOGY CLINIC | Age: 36
End: 2021-11-29
Payer: COMMERCIAL

## 2021-11-29 VITALS
HEART RATE: 72 BPM | WEIGHT: 185.2 LBS | HEIGHT: 70 IN | BODY MASS INDEX: 26.51 KG/M2 | SYSTOLIC BLOOD PRESSURE: 92 MMHG | DIASTOLIC BLOOD PRESSURE: 62 MMHG

## 2021-11-29 DIAGNOSIS — I21.4 NSTEMI (NON-ST ELEVATED MYOCARDIAL INFARCTION) (HCC): ICD-10-CM

## 2021-11-29 DIAGNOSIS — G45.9 TIA (TRANSIENT ISCHEMIC ATTACK): ICD-10-CM

## 2021-11-29 DIAGNOSIS — I21.4 NSTEMI (NON-ST ELEVATED MYOCARDIAL INFARCTION) (HCC): Primary | ICD-10-CM

## 2021-11-29 DIAGNOSIS — I63.429 CEREBROVASCULAR ACCIDENT (CVA) DUE TO EMBOLISM OF ANTERIOR CEREBRAL ARTERY, UNSPECIFIED BLOOD VESSEL LATERALITY (HCC): ICD-10-CM

## 2021-11-29 DIAGNOSIS — R00.2 PALPITATIONS: Primary | ICD-10-CM

## 2021-11-29 DIAGNOSIS — I25.5 ISCHEMIC CARDIOMYOPATHY: ICD-10-CM

## 2021-11-29 LAB
INTERNATIONAL NORMALIZATION RATIO, POC: 3.5
POC INR: 3.5 INDEX
PROTHROMBIN TIME, POC: 41.5 SECONDS (ref 10–14.3)

## 2021-11-29 PROCEDURE — 85610 PROTHROMBIN TIME: CPT

## 2021-11-29 PROCEDURE — 36416 COLLJ CAPILLARY BLOOD SPEC: CPT

## 2021-11-29 PROCEDURE — 99212 OFFICE O/P EST SF 10 MIN: CPT

## 2021-11-29 PROCEDURE — 99214 OFFICE O/P EST MOD 30 MIN: CPT | Performed by: INTERNAL MEDICINE

## 2021-11-29 PROCEDURE — 93000 ELECTROCARDIOGRAM COMPLETE: CPT | Performed by: INTERNAL MEDICINE

## 2021-11-29 NOTE — PATIENT INSTRUCTIONS
**It is YOUR responsibilty to bring medication bottles and/or updated medication list to [address] [place] [address]. This will allow us to better serve you and all your healthcare needs**    Please be informed that if you contact our office outside of normal business hours the physician on call cannot help with any scheduling or rescheduling issues, procedure instruction questions or any type of medication issue. We advise you for any urgent/emergency that you go to the nearest emergency room!     PLEASE CALL OUR OFFICE DURING NORMAL BUSINESS HOURS    Monday - Friday   8 am to 5 pm    [place]: [phone]    [place]:  [phone]

## 2021-11-29 NOTE — LETTER
[facility]vin Bapt[facility]  1985  [id number]    Have you had any Chest Pain that is not new? - No     DO EKG IF: Patient has a Heart Rate above 100 or below 40     CAD (Coronary Artery Disease) patient should have one on file every 6 months        Have you had any Shortness of Breath - No    Have you had any dizziness - No       Sitting wait 5 minutes do supine (laying down) wait 5 minutes then do standing - log each in \"vitals\" area in Epic   Be sure to ask what symptoms they are having if they get dizzy while completing ortho stats such as: room spinning, nausea, etc.    Have you had any palpitations that are not new?  - Yes  If Yes DO EKG - Do you feel your heart racing  How long does it last - .2  seconds     Do you have any edema - swelling in No        Do you have a surgery or procedure scheduled in the near future - No

## 2021-11-29 NOTE — LETTER
[facility]  [address]  [address], [place] [address]  Phone: [phone]    Fax [phone]                Rosalino Castro MD, Carson Lewis MD, [address] MD, MD Soo Stevens Aas, MD Carnell Speck, MD Ivory Los, MD Lennie Burows APRN-CNP Muniragerard Mcneil APRN-CNP  Samir Smiley APRN-CNP    11/29/2021    Patient:  Ning Xavier  YOB: 1985    To Whom It May Concern:    Lele Ramirez has been under my care since August 22, 2021due to extensive cardiac history including NSTEMI (Heart attack), Ischemic Cardiomyopathy (Heart failure), Ejection fraction of 20% which has resulted in patient wearing LifeVest (a temporary device worn by a patient while awaiting possible ICD), Cerebral Vascular Accident (CVA) with punctate infarct in the left frontal lobe. Patient is scheduled for December 6, 2021 to have a MUGA scan, a test performed to evaluate or re-evaluate Ejection fraction (EF) to determine if he is able to attend Cardiac Rehab. Patient will undergo cardiac cath with possible stent in LAD (Left Anterior Decending Artery) in December 2021. Patient may need an additional procedure for an Implantable Cardioverter Defibrillator (ICD) in the next few weeks . Due to the severity of the patients illness, John Shelton will benefit from having a family member to assist in his care. I am writing this letter on grounds of the need for medical assistance, to support the patients sister, Haylee Dailey who has submitted an application for a VISA. Thank you for your time and consideration. If you have any questions or concerns, please to call my office.     Respectfully,        Rita Goldberg MD  STR/zackary

## 2021-11-29 NOTE — TELEPHONE ENCOUNTER
Patient advised of letter completion and that he can stop by the  to pick it up. He voiced understanding.

## 2021-11-29 NOTE — PROGRESS NOTES
CARDIOLOGY  NOTE    Chief Complaint: CHF/ cardiomyopathy /ASCVD    HPI:   Darlene Isbell is a 30y.o. year old who has Past medical history as noted below. Darlene Isbell is planned to see Dr Hammad Baeza for LAD  intervention in few weeks  as yet he is still being cautious and is avoiding stress he is working form home. HE is hoping his sister can come to help him    He was hospitalized in Aug 2021  after recent CVA causing aphasia but is recovered from his CVA. During work-up he was also found to have severe cardiomyopathy with ejection fraction of 20% with akinetic apex and septal wall. Cardiac cath revealed occluded LAD. Viability study shows infarcted apex but viable anterior wall. HE is going to se Dr Jane Oropeza for PCP soon   He is tolerating the meds well HE is not dizzy or lightheaded . He is trying to wear life vest but it gives him a lot of anxiety and keeps him up often at night   He says his breathing is okay he is tolerating meds he was started on guideline recommended medical therapy he has no ankle swelling or shortness of breath he was started on Coumadin due to concerns for possible embolic event from dilated cardiomyopathy. 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 Current Outpatient Medications   Medication Sig Dispense Refill    folic acid-pyridoxine-cyancobalamin (FOLTX) 1.13-25-2 MG TABS Take 1 tablet by mouth daily 90 tablet 3    lisinopril (PRINIVIL;ZESTRIL) 40 MG tablet Take 1 tablet by mouth daily HOLD FOR SBP LESS THAN 90 AND HR LESS THAN 60 90 tablet 3    metoprolol succinate (TOPROL XL) 100 MG extended release tablet Take 1 tablet by mouth daily Do not crush or chew.  HOLD FOR SBP LESS THAN 90 AND HR LESS THAN 60 90 tablet 3    spironolactone (ALDACTONE) 25 MG tablet Take 1 tablet by mouth daily 90 tablet 3    warfarin (COUMADIN) 2 MG tablet TAKE THREE TABLETS BY MOUTH DAILY, EXCEPT TAKE TWO TABLETS DAILY ON , , AND , OR AS DIRECTED. 79 tablet 3    aspirin 81 MG chewable tablet Take 1 tablet by mouth daily 90 tablet 3    atorvastatin (LIPITOR) 80 MG tablet Take 1 tablet by mouth nightly 90 tablet 3     No current facility-administered medications for this visit. Allergies:   Patient has no known allergies. Patient History:  History reviewed. No pertinent past medical history. History reviewed. No pertinent surgical history. History reviewed. No pertinent family history. Social History     Tobacco Use    Smoking status: Former Smoker     Packs/day: 0.50     Start date: 2021     Quit date: 2021     Years since quittin.2    Smokeless tobacco: Never Used   Substance Use Topics    Alcohol use: Never        Review of Systems:   · Constitutional: No Fever or Weight Loss   · Eyes: No Decreased Vision  · ENT: No Headaches, Hearing Loss or Vertigo  · Cardiovascular: as per note above   · Respiratory: No cough or wheezing and as per note above.    · Gastrointestinal: No abdominal pain, appetite loss, blood in stools, constipation, diarrhea or heartburn  · Genitourinary: No dysuria, trouble voiding, or hematuria  · Musculoskeletal:  denies any new  joint aches , swelling  or pain   · Integumentary: No rash or pruritis  · Neurological: No TIA or stroke symptoms  · Psychiatric: No anxiety or depression  · Endocrine: No malaise, fatigue or temperature intolerance  · Hematologic/Lymphatic: No bleeding problems, blood clots or swollen lymph nodes  · Allergic/Immunologic: No nasal congestion or hives    Objective:      Physical Exam:  BP 92/62 (Site: Left Upper Arm, Position: Sitting, Cuff Size: Medium Adult)   Pulse 72   Ht 5' 10\" (1.778 m)   Wt 185 lb 3.2 oz (84 kg)   BMI 26.57 kg/m²   Wt Readings from Last 3 Encounters:   21 185 lb 3.2 oz (84 kg)   10/25/21 183 lb (83 kg)   21 181 lb 3.2 oz (82.2 kg)     Body mass index is 26.57 kg/m². Vitals:    11/29/21 1041   BP: 92/62   Pulse:         General Appearance:  No distress, conversant  Constitutional:  Well developed, Well nourished, No acute distress, Non-toxic appearance. HENT:  Normocephalic, Atraumatic, Bilateral external ears normal, Oropharynx moist, No oral exudates, Nose normal. Neck- Normal range of motion, No tenderness, Supple, No stridor,no apical-carotid delay  Eyes:  PERRL, EOMI, Conjunctiva normal, No discharge. Respiratory:  Normal breath sounds, No respiratory distress, No wheezing, No chest tenderness. ,no use of accessory muscles, NO crackles  Cardiovascular: (PMI) apex non displaced,no lifts no thrills,S1 and S2 audible, No added heart sounds, No signs of ankle edema, or volume overload, No evidence of JVD, No crackles  GI:  Bowel sounds normal, Soft, No tenderness, No masses, No gross visceromegaly   :  No costovertebral angle tenderness   Musculoskeletal:  No edema, no tenderness, no deformities. Back- no tenderness  Integument:  Well hydrated, no rash   Lymphatic:  No lymphadenopathy noted   Neurologic:  Alert & oriented x 3, CN 2-12 normal, normal motor function, normal sensory function, no focal deficits noted   Psychiatric:  Speech and behavior appropriate       Medical decision making and Data review:  DATA:  Lab Results   Component Value Date    TROPONINT 2.320 ([facility]) 08/22/2021     BNP:  No results found for: PROBNP  PT/INR:  No results found for: PTINR  No results found for: LABA1C  Lab Results   Component Value Date    CHOL 149 08/22/2021    TRIG 148 08/22/2021    HDL 28 (L) 08/22/2021    LDLDIRECT 94 08/22/2021     Lab Results   Component Value Date    ALT 30 08/22/2021    AST 30 08/22/2021     No results for input(s): WBC, HGB, HCT, MCV, PLT in the last 72 hours.   TSH: No results found for: TSH  Lab Results   Component Value Date    AST 30 08/22/2021    ALT 30 08/22/2021    BILITOT 0.4 08/22/2021    ALKPHOS 95 08/22/2021     No results found for: PROBNP  No results found for: LABA1C  Lab Results   Component Value Date    WBC 9.9 08/26/2021    HGB 16.2 08/26/2021    HCT 51.2 08/26/2021     08/26/2021     Cath 8/23/21  LMCA: Normal.     LAD: Abnormal.100% occluded prox LAD with Collaterals from Rt filling the LAD     LCx: Moderate Lumen Irregularity. Right dominance noted. OM1 prox 50% stenosis     RCA: Normal.Right dominance noted. Rt to Left Collaterals noted to LAD territory     Echo 8/23/21   Summary   Left ventricular systolic function is abnormal.   Ejection fraction is visually estimated at <20%. Apical, septal, and anterior wall segments are akinetic. Apical ballooning noted. Severly dilated left ventricle measuring 7.0cm in diastole. No evidence of any pericardial effusion. Repeat study with Definity to rule out LV thrombus             All labs, medications and tests reviewed by myself including data and history from outside source , patient and available family . Assessment & Plan:      1. Palpitations    2. NSTEMI (non-ST elevated myocardial infarction) ([address]    3. Cerebrovascular accident (CVA) due to embolism of anterior cerebral artery, unspecified blood vessel laterality ([address]    4. TIA (transient ischemic attack)    5. Ischemic cardiomyopathy         Ischemic cardiomyopathy   History of 20% wearing LifeVest continue to titrate up medication increase Toprol- mg daily and increase  lisinopril 20 mg daily.   Anterior wall is viable will debate LAD intervention after discussing with  team.  Reevaluate in 3 month debate ICD by Dec 2021 will get   Plan for  Dr Panfilo Ontiveros for possible  intervention , get MUGA scan   I spoke and sent all his messages to his cardiologist friend in [place]   He is asking for letter for his sister to get visa to visit him , I will issue it   Refer to cardiac rehab depending on MUGA scan     TIA (transient ischemic attack)   Acute CVA probably secondary to embolic event due to LV function possible source of embolic event continue Coumadin keep INR between 2-2.5 referred to Coumadin clinic    Cerebrovascular accident (CVA) due to embolism ([address]  Continue Coumadin    Cysticercosis  Evaluated by ID      Dyslipidemia :  All available lab work was reviewed. Patient was advised to repeat lab work before next visit. Necessary orders were placed , instructions given by myself       Counseled extensively and medication compliance urged. We discussed that for the  prevention of ASCVD our  goal is aggressive risk modification. Patient is encouraged to exercise if they can , educated about  brisk walk for 30 minutes  at least 3 to 4 times a week if there are no physical limitations  Various goals were discussed and questions answered. Continue current medications. Appropriate prescriptions are addressed and refills ordered. Questions answered and patient verbalizes understanding. Call for any problems, questions, or concerns. Greater than 60 % of time spent counseling besides reviewing data and images     Continue all other medications of all above medical condition listed as is. Return in about 3 months (around 2/28/2022). Please note this report has been partially produced using speech recognition software and may contain errors related to that system including errors in grammar, punctuation, and spelling, as well as words and phrases that may be inappropriate. If there are any questions or concerns please feel free to contact the dictating provider for clarification.

## 2021-11-29 NOTE — PROGRESS NOTES
Medication Management Service  [facility]  [phone]    Visit Date: 11/29/2021   Subjective:       Valente Franklin is a 30 y.o. male who presents to clinic today for anticoagulation monitoring and adjustment. Patient seen in clinic for warfarin management due to  Indication:   CVA and TIA. INR goal: of 2.0-3.0. Duration of therapy: indefinite. Patient reports the following:   Adherent with regimen  Missed or extra doses:  None   Bleeding or thromboembolic side effects:  None  Significant medication changes:  None  Significant dietary changes: None  Significant alcohol or tobacco changes: None  Significant recent illness, disease state changes, or hospitalization:  None  Upcoming surgeries or procedures:  None  Falls: None           Assessment and Plan     PT/INR done in office per protocol. INR today is 3.5, supratherapeutic. Possibly elevated due to temporary decreased vitamin K in diet  Plan:  Take warfarin 4mg today then \will continue current regimen of warfarin 6mg daily except 4mg on Thursdays. Recheck INR in 2 week(s). Patient verbalized understanding of dosing directions and information discussed. Dosing schedule given to patient including phone number, appointment date, and time. Progress note sent to referring office. Patient acknowledges working in consult agreement with pharmacist as referred by his/her physician.       Electronically signed by Ledy White [facility] on 11/29/21 at 10:04 AM EST    For Pharmacy Admin Tracking Only     Intervention Detail: Dose Adjustment: 1, reason: Therapy Optimization   Total # of Interventions Recommended: 1   Total # of Interventions Accepted: 1   Time Spent (min): 15

## 2021-12-06 ENCOUNTER — PROCEDURE VISIT (OUTPATIENT)
Dept: CARDIOLOGY CLINIC | Age: 36
End: 2021-12-06
Payer: COMMERCIAL

## 2021-12-06 DIAGNOSIS — I25.5 ISCHEMIC CARDIOMYOPATHY: Primary | ICD-10-CM

## 2021-12-06 DIAGNOSIS — I21.4 NSTEMI (NON-ST ELEVATED MYOCARDIAL INFARCTION) (HCC): ICD-10-CM

## 2021-12-06 DIAGNOSIS — I63.429 CEREBROVASCULAR ACCIDENT (CVA) DUE TO EMBOLISM OF ANTERIOR CEREBRAL ARTERY, UNSPECIFIED BLOOD VESSEL LATERALITY (HCC): ICD-10-CM

## 2021-12-06 DIAGNOSIS — R00.2 PALPITATIONS: ICD-10-CM

## 2021-12-06 DIAGNOSIS — G45.9 TIA (TRANSIENT ISCHEMIC ATTACK): ICD-10-CM

## 2021-12-06 PROBLEM — I10 HTN (HYPERTENSION): Status: ACTIVE | Noted: 2021-12-06

## 2021-12-06 PROBLEM — Z95.810 PRESENCE OF EXTERNAL CARDIAC DEFIBRILLATOR: Status: ACTIVE | Noted: 2021-12-01

## 2021-12-06 PROBLEM — Z79.01 ANTICOAGULATED ON COUMADIN: Status: ACTIVE | Noted: 2021-12-06

## 2021-12-06 LAB
LV EF: 26 %
LVEF MODALITY: NORMAL

## 2021-12-06 PROCEDURE — 78472 GATED HEART PLANAR SINGLE: CPT | Performed by: INTERNAL MEDICINE

## 2021-12-06 PROCEDURE — A9560 TC99M LABELED RBC: HCPCS | Performed by: INTERNAL MEDICINE

## 2021-12-09 ENCOUNTER — TELEPHONE (OUTPATIENT)
Dept: CARDIOLOGY CLINIC | Age: 36
End: 2021-12-09

## 2021-12-13 ENCOUNTER — TELEPHONE (OUTPATIENT)
Dept: PHARMACY | Age: 36
End: 2021-12-13

## 2021-12-13 DIAGNOSIS — I21.4 NSTEMI (NON-ST ELEVATED MYOCARDIAL INFARCTION) (HCC): Primary | ICD-10-CM

## 2021-12-13 DIAGNOSIS — G45.9 TIA (TRANSIENT ISCHEMIC ATTACK): ICD-10-CM

## 2021-12-13 NOTE — TELEPHONE ENCOUNTER
Patient called to cancel today's appointment due to procedure. Returned call to patient. Procedure scheduled for 12/15 and patient reports he was instructed to hold warfarin for 3 days prior, but he took warfarin 12/12. Rescheduled next INR 12/16.      For Pharmacy Admin Tracking Only     Intervention Detail:    Total # of Interventions Recommended:    Total # of Interventions Accepted:    Time Spent (min): 5

## 2021-12-23 ENCOUNTER — TELEPHONE (OUTPATIENT)
Dept: PHARMACY | Age: 36
End: 2021-12-23

## 2021-12-23 ENCOUNTER — ANTI-COAG VISIT (OUTPATIENT)
Dept: PHARMACY | Age: 36
End: 2021-12-23
Payer: COMMERCIAL

## 2021-12-23 DIAGNOSIS — I21.4 NSTEMI (NON-ST ELEVATED MYOCARDIAL INFARCTION) (HCC): Primary | ICD-10-CM

## 2021-12-23 DIAGNOSIS — G45.9 TIA (TRANSIENT ISCHEMIC ATTACK): ICD-10-CM

## 2021-12-23 LAB
INTERNATIONAL NORMALIZATION RATIO, POC: 1.1
POC INR: 1.1 INDEX
PROTHROMBIN TIME, POC: 13.6 SECONDS (ref 10–14.3)

## 2021-12-23 PROCEDURE — 99212 OFFICE O/P EST SF 10 MIN: CPT

## 2021-12-23 PROCEDURE — 36416 COLLJ CAPILLARY BLOOD SPEC: CPT

## 2021-12-23 PROCEDURE — 85610 PROTHROMBIN TIME: CPT

## 2021-12-23 RX ORDER — CLOPIDOGREL BISULFATE 75 MG/1
75 TABLET ORAL DAILY
COMMUNITY
Start: 2021-12-17

## 2021-12-23 NOTE — PROGRESS NOTES
Medication Management Service  [facility] [facility]  [phone]    Visit Date: 12/23/2021   Subjective:       Edilia Montile is a 29 y.o. male who presents to clinic today for anticoagulation monitoring and adjustment. Patient seen in clinic for warfarin management due to  Indication:   CVA, MI and TIA. INR goal: of 2.0-2.5. Duration of therapy: indefinite. Patient reports the following:   Adherent with regimen  Missed or extra doses:  None   Bleeding or thromboembolic side effects:  None  Significant medication changes:  None  Significant dietary changes: None  Significant alcohol or tobacco changes: None  Significant recent illness, disease state changes, or hospitalization:  None  Upcoming surgeries or procedures:  None  Falls: None           Assessment and Plan     PT/INR done in office per protocol. INR today is 1.1, subtherapeutic. Patient reports he has been taking warfarin 2mg since home from procedure 12/16. Patient inquired if warfarin could cause nausea or sores/splits in tongue. He reports nausea sometimes after taking warfarin. Plan: Take warfarin 8mg today then return to maintenance dose of warfarin 6mg daily except 4mg on Thursdays. Recheck INR in 1 week(s). Patient verbalized understanding of dosing directions and information discussed. Dosing schedule given to patient including phone number, appointment date, and time. Progress note sent to referring office. Patient acknowledges working in consult agreement with pharmacist as referred by his/her physician.       Electronically signed by Shanelle Gutierrez [facility] on 12/23/21 at 11:18 AM EST    For Pharmacy Admin Tracking Only     Intervention Detail: Dose Adjustment: 1, reason: Therapy Optimization   Total # of Interventions Recommended: 1   Total # of Interventions Accepted: 1   Time Spent (min): 15

## 2021-12-23 NOTE — TELEPHONE ENCOUNTER
Rescheduled for 11am today. Patient confirms he restarted warfarin day home from procedure.    For Pharmacy Admin Tracking Only     Intervention Detail:    Total # of Interventions Recommended:    Total # of Interventions Accepted:    Time Spent (min): 5

## 2021-12-30 ENCOUNTER — ANTI-COAG VISIT (OUTPATIENT)
Dept: PHARMACY | Age: 36
End: 2021-12-30
Payer: COMMERCIAL

## 2021-12-30 DIAGNOSIS — G45.9 TIA (TRANSIENT ISCHEMIC ATTACK): ICD-10-CM

## 2021-12-30 DIAGNOSIS — I21.4 NSTEMI (NON-ST ELEVATED MYOCARDIAL INFARCTION) (HCC): Primary | ICD-10-CM

## 2021-12-30 LAB
INR BLD: 1.4
POC INR: 1.4 INDEX
PROTHROMBIN TIME, POC: 17.4 SECONDS (ref 10–14.3)
PROTIME: 17.4 SECONDS

## 2021-12-30 PROCEDURE — 99212 OFFICE O/P EST SF 10 MIN: CPT

## 2021-12-30 PROCEDURE — 36416 COLLJ CAPILLARY BLOOD SPEC: CPT

## 2021-12-30 PROCEDURE — 85610 PROTHROMBIN TIME: CPT

## 2021-12-30 NOTE — PROGRESS NOTES
Medication Management Service  [facility]  [phone]    Visit Date: 12/30/2021   Subjective:       Valente Franklin is a 29 y.o. male who presents to clinic today for anticoagulation monitoring and adjustment. Patient seen in clinic for warfarin management due to  Indication:   CVA, MI and TIA. INR goal: of 2.0 - 2.5. Duration of therapy: indefinite. Patient reports the following:   Adherent with regimen  Missed or extra doses:  None   Bleeding or thromboembolic side effects:  None  Significant medication changes:  None  Significant dietary changes: None  Significant alcohol or tobacco changes: None  Significant recent illness, disease state changes, or hospitalization:  None  Upcoming surgeries or procedures:  None  Falls: None           Assessment and Plan     PT/INR done in office per protocol. INR today is 1.4, subtherapeutic despite previous booster dose. Plan:  Take warfarin 8mg x 2 then will continue current regimen of warfarin 6mg daily except 4mg Thursdays. Recheck INR in 4 days. Patient verbalized understanding of dosing directions and information discussed. Dosing schedule given to patient including phone number, appointment date, and time. Progress note sent to referring office. Patient acknowledges working in consult agreement with pharmacist as referred by his/her physician.       Electronically signed by Mariya Terry, [facility] on 12/30/21 at 12:34 PM EST    For Pharmacy Admin Tracking Only     Intervention Detail: Dose Adjustment: 1, reason: Therapy Optimization   Total # of Interventions Recommended: 1   Total # of Interventions Accepted: 1   Time Spent (min): 15

## 2022-01-03 ENCOUNTER — ANTI-COAG VISIT (OUTPATIENT)
Dept: PHARMACY | Age: 37
End: 2022-01-03
Payer: COMMERCIAL

## 2022-01-03 DIAGNOSIS — G45.9 TIA (TRANSIENT ISCHEMIC ATTACK): ICD-10-CM

## 2022-01-03 DIAGNOSIS — I21.4 NSTEMI (NON-ST ELEVATED MYOCARDIAL INFARCTION) (HCC): Primary | ICD-10-CM

## 2022-01-03 LAB
INTERNATIONAL NORMALIZATION RATIO, POC: 1.8
POC INR: 1.8 INDEX
PROTHROMBIN TIME, POC: 21.1 SECONDS (ref 10–14.3)

## 2022-01-03 PROCEDURE — 85610 PROTHROMBIN TIME: CPT

## 2022-01-03 PROCEDURE — 99212 OFFICE O/P EST SF 10 MIN: CPT

## 2022-01-03 PROCEDURE — 36416 COLLJ CAPILLARY BLOOD SPEC: CPT

## 2022-01-03 NOTE — PROGRESS NOTES
Medication Management Service  [facility]  [phone]    Visit Date: 1/3/2022   Subjective:       Soy Powers is a 29 y.o. male who presents to clinic today for anticoagulation monitoring and adjustment. Patient seen in clinic for warfarin management due to  Indication:   CVA and MI. INR goal: of 2.0-3.0. Duration of therapy: indefinite. Patient reports the following:   Adherent with regimen  Missed or extra doses:  None   Bleeding or thromboembolic side effects:  None  Significant medication changes:  None  Significant dietary changes: None  Significant alcohol or tobacco changes: None  Significant recent illness, disease state changes, or hospitalization:  None  Upcoming surgeries or procedures:  None  Falls: None           Assessment and Plan     PT/INR done in office per protocol. INR today is 1.8, subtherapeutic, but increasing after 8mg booster doses. Plan: Take warfarin 8mg today then 6mg daily for 2 days. Recheck INR in 3 days. Patient verbalized understanding of dosing directions and information discussed. Dosing schedule given to patient including phone number, appointment date, and time. Progress note sent to referring office. Patient acknowledges working in consult agreement with pharmacist as referred by his/her physician.       Electronically signed by Trista Sun [facility] on 1/3/22 at 4:55 PM EST  For Pharmacy Admin Tracking Only     Intervention Detail: Dose Adjustment: 1, reason: Therapy Optimization   Total # of Interventions Recommended: 1   Total # of Interventions Accepted: 1   Time Spent (min): 15

## 2022-01-04 ENCOUNTER — TELEPHONE (OUTPATIENT)
Dept: CARDIOLOGY CLINIC | Age: 37
End: 2022-01-04

## 2022-01-06 ENCOUNTER — ANTI-COAG VISIT (OUTPATIENT)
Dept: PHARMACY | Age: 37
End: 2022-01-06
Payer: COMMERCIAL

## 2022-01-06 DIAGNOSIS — I21.4 NSTEMI (NON-ST ELEVATED MYOCARDIAL INFARCTION) (HCC): Primary | ICD-10-CM

## 2022-01-06 DIAGNOSIS — G45.9 TIA (TRANSIENT ISCHEMIC ATTACK): ICD-10-CM

## 2022-01-06 LAB
INTERNATIONAL NORMALIZATION RATIO, POC: 1.7
POC INR: 1.7 INDEX
PROTHROMBIN TIME, POC: 20.9 SECONDS (ref 10–14.3)

## 2022-01-06 PROCEDURE — 99212 OFFICE O/P EST SF 10 MIN: CPT

## 2022-01-06 PROCEDURE — 85610 PROTHROMBIN TIME: CPT

## 2022-01-06 PROCEDURE — 36416 COLLJ CAPILLARY BLOOD SPEC: CPT

## 2022-01-06 NOTE — PROGRESS NOTES
Medication Management Service  [facility]  [phone]    Visit Date: 1/6/2022   Subjective:       Gab Webb is a 29 y.o. male who presents to clinic today for anticoagulation monitoring and adjustment. Patient seen in clinic for warfarin management due to  Indication:   CVA, MI and TIA. INR goal: of 2.0-3.0. Duration of therapy: indefinite. Patient reports the following:   Adherent with regimen  Missed or extra doses:  None   Bleeding or thromboembolic side effects:  None  Significant medication changes:  None  Significant dietary changes: None  Significant alcohol or tobacco changes: None  Significant recent illness, disease state changes, or hospitalization:  cough  Upcoming surgeries or procedures:  None  Falls: None           Assessment and Plan     PT/INR done in office per protocol. INR today is 1.7, subtherapeutic. Last 7 day total dose of 48mg. Patient has had 3 days of 8mg in the past 7 days. Will increase to 4 days in the next week of 8mg. Patient reports cough today and reports he spoke to his doctor. Plan:  Take warfarin 8mg today then Increase weekly dose 20% to warfarin 6mg daily except 8mg on MWF. Recheck INR in 1 week(s). Patient verbalized understanding of dosing directions and information discussed. Dosing schedule given to patient including phone number, appointment date, and time. Progress note sent to referring office. Patient acknowledges working in consult agreement with pharmacist as referred by his/her physician.       Electronically signed by QUTEA Wharton [facility] on 1/6/22 at 5:03 PM EST    For Pharmacy Admin Tracking Only     Intervention Detail: Dose Adjustment: 1, reason: Therapy Optimization   Total # of Interventions Recommended: 1   Total # of Interventions Accepted: 1   Time Spent (min): 15

## 2022-01-13 ENCOUNTER — ANTI-COAG VISIT (OUTPATIENT)
Dept: PHARMACY | Age: 37
End: 2022-01-13
Payer: COMMERCIAL

## 2022-01-13 DIAGNOSIS — G45.9 TIA (TRANSIENT ISCHEMIC ATTACK): ICD-10-CM

## 2022-01-13 DIAGNOSIS — I21.4 NSTEMI (NON-ST ELEVATED MYOCARDIAL INFARCTION) (HCC): Primary | ICD-10-CM

## 2022-01-13 LAB
INTERNATIONAL NORMALIZATION RATIO, POC: 3.3
POC INR: 3.3 INDEX
PROTHROMBIN TIME, POC: 39.1 SECONDS (ref 10–14.3)

## 2022-01-13 PROCEDURE — 36416 COLLJ CAPILLARY BLOOD SPEC: CPT

## 2022-01-13 PROCEDURE — 85610 PROTHROMBIN TIME: CPT

## 2022-01-13 PROCEDURE — 99212 OFFICE O/P EST SF 10 MIN: CPT

## 2022-01-13 NOTE — PROGRESS NOTES
Optimization   Total # of Interventions Recommended: 1   Total # of Interventions Accepted: 1   Time Spent (min): 15

## 2022-01-20 ENCOUNTER — ANTI-COAG VISIT (OUTPATIENT)
Dept: PHARMACY | Age: 37
End: 2022-01-20
Payer: COMMERCIAL

## 2022-01-20 DIAGNOSIS — I21.4 NSTEMI (NON-ST ELEVATED MYOCARDIAL INFARCTION) (HCC): Primary | ICD-10-CM

## 2022-01-20 DIAGNOSIS — G45.9 TIA (TRANSIENT ISCHEMIC ATTACK): ICD-10-CM

## 2022-01-20 LAB
INTERNATIONAL NORMALIZATION RATIO, POC: 2.8
POC INR: 2.8 INDEX
PROTHROMBIN TIME, POC: 34 SECONDS (ref 10–14.3)

## 2022-01-20 PROCEDURE — 85610 PROTHROMBIN TIME: CPT

## 2022-01-20 PROCEDURE — 36416 COLLJ CAPILLARY BLOOD SPEC: CPT

## 2022-01-20 PROCEDURE — 99212 OFFICE O/P EST SF 10 MIN: CPT

## 2022-01-25 RX ORDER — LISINOPRIL 20 MG/1
TABLET ORAL
Qty: 30 TABLET | Refills: 3 | OUTPATIENT
Start: 2022-01-25

## 2022-01-27 ENCOUNTER — ANTI-COAG VISIT (OUTPATIENT)
Dept: PHARMACY | Age: 37
End: 2022-01-27
Payer: COMMERCIAL

## 2022-01-27 DIAGNOSIS — I21.4 NSTEMI (NON-ST ELEVATED MYOCARDIAL INFARCTION) (HCC): Primary | ICD-10-CM

## 2022-01-27 DIAGNOSIS — G45.9 TIA (TRANSIENT ISCHEMIC ATTACK): ICD-10-CM

## 2022-01-27 LAB
INTERNATIONAL NORMALIZATION RATIO, POC: 2.3
POC INR: 2.3 INDEX
PROTHROMBIN TIME, POC: 28.2 SECONDS (ref 10–14.3)

## 2022-01-27 PROCEDURE — 36416 COLLJ CAPILLARY BLOOD SPEC: CPT

## 2022-01-27 PROCEDURE — 99212 OFFICE O/P EST SF 10 MIN: CPT

## 2022-01-27 PROCEDURE — 85610 PROTHROMBIN TIME: CPT

## 2022-01-27 RX ORDER — WARFARIN SODIUM 2 MG/1
6 TABLET ORAL DAILY
Qty: 100 TABLET | Refills: 2 | Status: SHIPPED | OUTPATIENT
Start: 2022-01-27 | End: 2022-02-17 | Stop reason: SDUPTHER

## 2022-01-27 NOTE — PROGRESS NOTES
Medication Management Service  [facility]  [phone]    Visit Date: 1/27/2022   Subjective:       Bryon Díaz is a 29 y.o. male who presents to clinic today for anticoagulation monitoring and adjustment. Patient seen in clinic for warfarin management due to  Indication:   CVA, MI and TIA. INR goal: of 2.0-2.5. Duration of therapy: indefinite. Patient reports the following:   Adherent with regimen  Missed or extra doses:  None   Bleeding or thromboembolic side effects:  None  Significant medication changes:  None  Significant dietary changes: None  Significant alcohol or tobacco changes: None  Significant recent illness, disease state changes, or hospitalization:  None  Upcoming surgeries or procedures:  None  Falls: None           Assessment and Plan     PT/INR done in office per protocol. INR today is 2.3, therapeutic. Refill will be needed  Plan: Will continue current regimen of warfarin 6mg daily except 8mg on Tuesdays and Fridays. ERx sent     Recheck INR in 3 week(s). Patient verbalized understanding of dosing directions and information discussed. Dosing schedule given to patient including phone number, appointment date, and time. Progress note sent to referring office. Patient acknowledges working in consult agreement with pharmacist as referred by his/her physician.       Electronically signed by QUETA Cuenca [facility] on 1/27/22 at 5:07 PM EST    For Pharmacy Admin Tracking Only     Intervention Detail: Refill(s) Provided   Total # of Interventions Recommended: 1   Total # of Interventions Accepted: 1   Time Spent (min): 20

## 2022-02-17 ENCOUNTER — ANTI-COAG VISIT (OUTPATIENT)
Dept: PHARMACY | Age: 37
End: 2022-02-17
Payer: COMMERCIAL

## 2022-02-17 DIAGNOSIS — G45.9 TIA (TRANSIENT ISCHEMIC ATTACK): ICD-10-CM

## 2022-02-17 DIAGNOSIS — I21.4 NSTEMI (NON-ST ELEVATED MYOCARDIAL INFARCTION) (HCC): Primary | ICD-10-CM

## 2022-02-17 LAB
INTERNATIONAL NORMALIZATION RATIO, POC: 1.9
POC INR: 1.9 INDEX
PROTHROMBIN TIME, POC: 23.2 SECONDS (ref 10–14.3)

## 2022-02-17 PROCEDURE — 99211 OFF/OP EST MAY X REQ PHY/QHP: CPT

## 2022-02-17 PROCEDURE — 36416 COLLJ CAPILLARY BLOOD SPEC: CPT

## 2022-02-17 PROCEDURE — 85610 PROTHROMBIN TIME: CPT

## 2022-02-17 RX ORDER — WARFARIN SODIUM 2 MG/1
6 TABLET ORAL DAILY
Qty: 100 TABLET | Refills: 2 | Status: SHIPPED | OUTPATIENT
Start: 2022-02-17 | End: 2022-04-18 | Stop reason: SDUPTHER

## 2022-02-25 RX ORDER — WARFARIN SODIUM 2 MG/1
TABLET ORAL
Qty: 79 TABLET | OUTPATIENT
Start: 2022-02-25

## 2022-03-01 ENCOUNTER — TELEPHONE (OUTPATIENT)
Dept: PHARMACY | Age: 37
End: 2022-03-01

## 2022-03-03 ENCOUNTER — ANTI-COAG VISIT (OUTPATIENT)
Dept: PHARMACY | Age: 37
End: 2022-03-03
Payer: COMMERCIAL

## 2022-03-03 DIAGNOSIS — I21.4 NSTEMI (NON-ST ELEVATED MYOCARDIAL INFARCTION) (HCC): Primary | ICD-10-CM

## 2022-03-03 DIAGNOSIS — G45.9 TIA (TRANSIENT ISCHEMIC ATTACK): ICD-10-CM

## 2022-03-03 LAB
INTERNATIONAL NORMALIZATION RATIO, POC: 3
POC INR: 3 INDEX
PROTHROMBIN TIME, POC: 36.1 SECONDS (ref 10–14.3)

## 2022-03-03 PROCEDURE — 99211 OFF/OP EST MAY X REQ PHY/QHP: CPT

## 2022-03-03 PROCEDURE — 36416 COLLJ CAPILLARY BLOOD SPEC: CPT

## 2022-03-03 PROCEDURE — 85610 PROTHROMBIN TIME: CPT

## 2022-03-03 NOTE — PROGRESS NOTES
Medication Management Service  [facility]  [phone]    Visit Date: 3/3/2022   Subjective:       Gab Webb is a 29 y.o. male who presents to clinic today for anticoagulation monitoring and adjustment. Patient seen in clinic for warfarin management due to  Indication:   CVA, MI and TIA. INR goal: of 2.0-2.5. Duration of therapy: indefinite. Patient reports the following:   Adherent with regimen  Missed or extra doses:  None   Bleeding or thromboembolic side effects:  None  Significant medication changes:  None  Significant dietary changes: None  Significant alcohol or tobacco changes: None  Significant recent illness, disease state changes, or hospitalization:  None  Upcoming surgeries or procedures:  None  Falls: None           Assessment and Plan     PT/INR done in office per protocol. INR today is 3.0, supratherapeutic. Will clarify INR goal with referring provider      Extra dose is a possibility per patient. Plan: Will continue current regimen of warfarin 6mg daily except 8mg on Tuesdays and Fridays. Recheck INR in 1.5 week(s). Patient verbalized understanding of dosing directions and information discussed. Dosing schedule given to patient including phone number, appointment date, and time. Progress note sent to referring office. Patient acknowledges working in consult agreement with pharmacist as referred by his/her physician.       Electronically signed by Belem Redd [facility] on 3/3/22 at 4:45 PM EST    For Pharmacy Admin Tracking Only     Intervention Detail:    Total # of Interventions Recommended:    Total # of Interventions Accepted:    Time Spent (min): 15

## 2022-03-14 ENCOUNTER — TELEPHONE (OUTPATIENT)
Dept: PHARMACY | Age: 37
End: 2022-03-14

## 2022-03-14 NOTE — TELEPHONE ENCOUNTER
Rescheduled for 3/17 due to can would not start.      For Pharmacy Admin Tracking Only     Intervention Detail:    Total # of Interventions Recommended:    Total # of Interventions Accepted:    Time Spent (min): 5

## 2022-03-17 ENCOUNTER — ANTI-COAG VISIT (OUTPATIENT)
Dept: PHARMACY | Age: 37
End: 2022-03-17
Payer: COMMERCIAL

## 2022-03-17 DIAGNOSIS — G45.9 TIA (TRANSIENT ISCHEMIC ATTACK): ICD-10-CM

## 2022-03-17 DIAGNOSIS — I21.4 NSTEMI (NON-ST ELEVATED MYOCARDIAL INFARCTION) (HCC): Primary | ICD-10-CM

## 2022-03-17 LAB
INTERNATIONAL NORMALIZATION RATIO, POC: 2.4
POC INR: 2.4 INDEX
PROTHROMBIN TIME, POC: 28.5 SECONDS (ref 10–14.3)

## 2022-03-17 PROCEDURE — 99211 OFF/OP EST MAY X REQ PHY/QHP: CPT

## 2022-03-17 PROCEDURE — 85610 PROTHROMBIN TIME: CPT

## 2022-03-17 PROCEDURE — 36416 COLLJ CAPILLARY BLOOD SPEC: CPT

## 2022-03-17 NOTE — PROGRESS NOTES
Medication Management Service  [facility]  [phone]    Visit Date: 3/17/2022   Subjective:       Guicho Nation is a 29 y.o. male who presents to clinic today for anticoagulation monitoring and adjustment. Patient seen in clinic for warfarin management due to  Indication:   CVA and TIA. INR goal: of 2.0-2.5. Duration of therapy: indefinite. Patient reports the following:   Adherent with regimen  Missed or extra doses:  None   Bleeding or thromboembolic side effects:  None  Significant medication changes:  None  Significant dietary changes: None  Significant alcohol or tobacco changes: None  Significant recent illness, disease state changes, or hospitalization:  None  Upcoming surgeries or procedures:  None  Falls: None           Assessment and Plan     PT/INR done in office per protocol. INR today is 2.4, therapeutic. Plan: Will continue current regimen of warfarin 6mg daily except 8mg on Tuesdays and Fridays. Recheck INR in 3 week(s). Patient verbalized understanding of dosing directions and information discussed. Dosing schedule given to patient including phone number, appointment date, and time. Progress note sent to referring office. Patient acknowledges working in consult agreement with pharmacist as referred by his/her physician.       Electronically signed by Delfina Mukherjee [facility] on 3/17/22 at 5:29 PM EDT    For Pharmacy Admin Tracking Only     Intervention Detail:    Total # of Interventions Recommended:    Total # of Interventions Accepted:    Time Spent (min): 15

## 2022-03-21 ENCOUNTER — OFFICE VISIT (OUTPATIENT)
Dept: CARDIOLOGY CLINIC | Age: 37
End: 2022-03-21
Payer: COMMERCIAL

## 2022-03-21 VITALS
HEIGHT: 70 IN | DIASTOLIC BLOOD PRESSURE: 70 MMHG | OXYGEN SATURATION: 99 % | SYSTOLIC BLOOD PRESSURE: 102 MMHG | HEART RATE: 80 BPM | BODY MASS INDEX: 26.88 KG/M2 | WEIGHT: 187.8 LBS

## 2022-03-21 DIAGNOSIS — I25.5 ISCHEMIC CARDIOMYOPATHY: Primary | ICD-10-CM

## 2022-03-21 DIAGNOSIS — G45.9 TIA (TRANSIENT ISCHEMIC ATTACK): ICD-10-CM

## 2022-03-21 DIAGNOSIS — I63.429 CEREBROVASCULAR ACCIDENT (CVA) DUE TO EMBOLISM OF ANTERIOR CEREBRAL ARTERY, UNSPECIFIED BLOOD VESSEL LATERALITY (HCC): ICD-10-CM

## 2022-03-21 DIAGNOSIS — Z79.01 ANTICOAGULATED ON COUMADIN: ICD-10-CM

## 2022-03-21 DIAGNOSIS — I21.4 NSTEMI (NON-ST ELEVATED MYOCARDIAL INFARCTION) (HCC): ICD-10-CM

## 2022-03-21 PROCEDURE — 99214 OFFICE O/P EST MOD 30 MIN: CPT | Performed by: INTERNAL MEDICINE

## 2022-03-21 RX ORDER — LISINOPRIL 40 MG/1
40 TABLET ORAL DAILY
Qty: 90 TABLET | Refills: 3 | Status: SHIPPED | OUTPATIENT
Start: 2022-03-21

## 2022-03-21 RX ORDER — METOPROLOL SUCCINATE 100 MG/1
100 TABLET, EXTENDED RELEASE ORAL DAILY
Qty: 90 TABLET | Refills: 3 | Status: SHIPPED | OUTPATIENT
Start: 2022-03-21 | End: 2022-07-26 | Stop reason: SDUPTHER

## 2022-03-21 RX ORDER — METOPROLOL SUCCINATE 50 MG/1
50 TABLET, EXTENDED RELEASE ORAL DAILY
Qty: 30 TABLET | Refills: 3 | Status: SHIPPED | OUTPATIENT
Start: 2022-03-21 | End: 2022-06-06 | Stop reason: SDUPTHER

## 2022-03-21 NOTE — PROGRESS NOTES
CARDIOLOGY  NOTE    Chief Complaint: CHF/ cardiomyopathy /ASCVD    HPI:   Cj Mosqueda is a 29y.o. year old who has Past medical history as noted below. jC Mosqueda had  LAD  intervention by Dr Davey Pat in Dec 2021 . t he is still being cautious and is avoiding stress he is working form home. He is not sob , sometimes he gest palpitations  HE is still wearing his life vest    He was hospitalized in Aug 2021  after recent CVA causing aphasia but is recovered from his CVA. During work-up he was also found to have severe cardiomyopathy with ejection fraction of 20% with akinetic apex and septal wall. Cardiac cath revealed occluded LAD. Viability study shows infarcted apex but viable anterior wall. HE is going to se Dr Juanjose Velarde for PCP   He is tolerating the meds well HE is not dizzy or lightheaded . He says his breathing is okay he is tolerating meds he was started on guideline recommended medical therapy he has no ankle swelling or shortness of breath he was started on Coumadin due to concerns for possible embolic event from dilated cardiomyopathy. 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 Current Outpatient Medications   Medication Sig Dispense Refill    metoprolol succinate (TOPROL XL) 100 MG extended release tablet Take 1 tablet by mouth daily Do not crush or chew. HOLD FOR SBP LESS THAN 90 AND HR LESS THAN 60  Take 150 mg 90 tablet 3    lisinopril (PRINIVIL;ZESTRIL) 40 MG tablet Take 1 tablet by mouth daily HOLD FOR SBP LESS THAN 90 AND HR LESS THAN 60 90 tablet 3    metoprolol succinate (TOPROL XL) 50 MG extended release tablet Take 1 tablet by mouth daily Take 150 mg daily 30 tablet 3    warfarin (COUMADIN) 2 MG tablet Take 3 tablets by mouth daily On Mondays, Wednesdays, Thursdays, Saturdays, and Sundays. Take 4 tablets on Tuesdays and Fridays or as directed by office.  100 tablet 2    clopidogrel (PLAVIX) 75 MG tablet Take 75 mg by mouth daily      folic acid-pyridoxine-cyancobalamin (FOLTX) 1.13-25-2 MG TABS Take 1 tablet by mouth daily 90 tablet 3    spironolactone (ALDACTONE) 25 MG tablet Take 1 tablet by mouth daily 90 tablet 3    aspirin 81 MG chewable tablet Take 1 tablet by mouth daily 90 tablet 3    atorvastatin (LIPITOR) 80 MG tablet Take 1 tablet by mouth nightly 90 tablet 3     No current facility-administered medications for this visit. Allergies:   Patient has no known allergies. Patient History:  Past Medical History:   Diagnosis Date    Anticoagulated on Coumadin     Aphasia 2021    C[facility]brovascular [facility] ([facility]A) [facility] to embolism [facility] 2021    Ischemic cardiomyopathy 2021    [facility]STEMI (non-ST elevated [facility] infarction) ([facility] 2021    Palpitations     Presence of external cardiac defibrillator 2021    Currently wearing LifeVest.     History reviewed. No pertinent surgical history. History reviewed. No pertinent family history. Social History     Tobacco Use    Smoking status: Former Smoker     Packs/day: 0.50     Start date: 2021     Quit date: 2021     Years since quittin.5    Smokeless tobacco: Never Used   Substance Use Topics    Alcohol use: Never        Review of Systems:   · Constitutional: No Fever or Weight Loss   · Eyes: No Decreased Vision  · ENT: No Headaches, Hearing Loss or Vertigo  · Cardiovascular: as per note above   · Respiratory: No cough or wheezing and as per note above.    · Gastrointestinal: No abdominal pain, appetite loss, blood in stools, constipation, diarrhea or heartburn  · Genitourinary: No dysuria, trouble voiding, or hematuria  · Musculoskeletal:  denies any new  joint aches , swelling  or pain   · Integumentary: No rash or pruritis  · Neurological: No TIA or stroke symptoms  · Psychiatric: No anxiety or depression  · Endocrine: No malaise, fatigue or temperature intolerance  · Hematologic/Lymphatic: No bleeding problems, blood clots or swollen lymph nodes  · Allergic/Immunologic: No nasal congestion or hives    Objective:      Physical Exam:  /70 (Site: Left Upper Arm, Position: Sitting, Cuff Size: Medium Adult)   Pulse 80   Ht 5' 10\" (1.778 m)   Wt 187 lb 12.8 oz (85.2 kg)   SpO2 99%   BMI 26.95 kg/m²   Wt Readings from Last 3 Encounters:   03/21/22 187 lb 12.8 oz (85.2 kg)   11/29/21 185 lb 3.2 oz (84 kg)   10/25/21 183 lb (83 kg)     Body mass index is 26.95 kg/m². Vitals:    03/21/22 1019   BP: 102/70   Pulse: 80   SpO2: 99%        General Appearance:  No distress, conversant  Constitutional:  Well developed, Well nourished, No acute distress, Non-toxic appearance. HENT:  Normocephalic, Atraumatic, Bilateral external ears normal, Oropharynx moist, No oral exudates, Nose normal. Neck- Normal range of motion, No tenderness, Supple, No stridor,no apical-carotid delay  Eyes:  PERRL, EOMI, Conjunctiva normal, No discharge. Respiratory:  Normal breath sounds, No respiratory distress, No wheezing, No chest tenderness. ,no use of accessory muscles, NO crackles  Cardiovascular: (PMI) apex non displaced,no lifts no thrills,S1 and S2 audible, No added heart sounds, No signs of ankle edema, or volume overload, No evidence of JVD, No crackles  GI:  Bowel sounds normal, Soft, No tenderness, No masses, No gross visceromegaly   :  No costovertebral angle tenderness   Musculoskeletal:  No edema, no tenderness, no deformities.  Back- no tenderness  Integument:  Well hydrated, no rash   Lymphatic:  No lymphadenopathy noted   Neurologic:  Alert & oriented x 3, CN 2-12 normal, normal motor function, normal sensory function, no focal deficits noted   Psychiatric:  Speech and behavior appropriate       Medical decision making and Data review:  DATA:  Lab Results   Component Value Date    TROPONINT 2.320 ([facility]) 08/22/2021     BNP:  No results found for: PROBNP  PT/INR:  No results found for: PTINR  No results found for: LABA1C  Lab Results   Component Value Date    CHOL 149 08/22/2021    TRIG 148 08/22/2021    HDL 28 (L) 08/22/2021    LDLDIRECT 94 08/22/2021     Lab Results   Component Value Date    ALT 30 08/22/2021    AST 30 08/22/2021     No results for input(s): WBC, HGB, HCT, MCV, PLT in the last 72 hours. TSH: No results found for: TSH  Lab Results   Component Value Date    AST 30 08/22/2021    ALT 30 08/22/2021    BILITOT 0.4 08/22/2021    ALKPHOS 95 08/22/2021     No results found for: PROBNP  No results found for: LABA1C  Lab Results   Component Value Date    WBC 9.9 08/26/2021    HGB 16.2 08/26/2021    HCT 51.2 08/26/2021     08/26/2021     Cath 8/23/21  LMCA: Normal.     LAD: Abnormal.100% occluded prox LAD with Collaterals from Rt filling the LAD     LCx: Moderate Lumen Irregularity. Right dominance noted. OM1 prox 50% stenosis     RCA: Normal.Right dominance noted. Rt to Left Collaterals noted to LAD territory     Echo 8/23/21   Summary   Left ventricular systolic function is abnormal.   Ejection fraction is visually estimated at <20%. Apical, septal, and anterior wall segments are akinetic. Apical ballooning noted. Severly dilated left ventricle measuring 7.0cm in diastole. No evidence of any pericardial effusion. Repeat study with Definity to rule out LV thrombus             All labs, medications and tests reviewed by myself including data and history from outside source , patient and available family . Assessment & Plan:      1. Ischemic cardiomyopathy    2. NSTEMI (non-ST elevated myocardial infarction) ([facility]    3. TIA (transient ischemic attack)    4. Cerebrovascular accident (CVA) due to embolism of anterior cerebral artery, unspecified blood vessel laterality ([address]    5.  Anticoagulated on Coumadin         ASCVD  S/p PCI of LAD in Dec 2021 continue DAPT ,will stop aspirin eventually continue plavix , He is on coumadin due to concern for CVA that he had probably related to cardiomyopathy       Ischemic cardiomyopathy   History of 20% wearing LifeVest continue to titrate up medication increase Toprol- mg daily and increase  lisinopril 20 mg daily. Anterior wall was  viable , He had  LAD intervention  In Dec 15 th , get echo to evaluate EF and then  debate ICD  Plan had Acute CVA probably secondary to embolic event due to LV function possible source of embolic event continue Coumadin keep INR between 2-2.5 followed to Coumadin clinic    Cerebrovascular accident (CVA) due to embolism ([address]  Continue Coumadin    Cysticercosis  Evaluated by ID ,NO antibiotics needed      Dyslipidemia :  All available lab work was reviewed. Patient was advised to repeat lab work before next visit. Necessary orders were placed , instructions given by myself       Counseled extensively and medication compliance urged. We discussed that for the  prevention of ASCVD our  goal is aggressive risk modification. Patient is encouraged to exercise if they can , educated about  brisk walk for 30 minutes  at least 3 to 4 times a week if there are no physical limitations  Various goals were discussed and questions answered. Continue current medications. Appropriate prescriptions are addressed and refills ordered. Questions answered and patient verbalizes understanding. Call for any problems, questions, or concerns. Greater than 60 % of time spent counseling besides reviewing data and images     Continue all other medications of all above medical condition listed as is. Return in about 1 month (around 4/21/2022). Please note this report has been partially produced using speech recognition software and may contain errors related to that system including errors in grammar, punctuation, and spelling, as well as words and phrases that may be inappropriate. If there are any questions or concerns please feel free to contact the dictating provider for clarification.

## 2022-03-21 NOTE — PATIENT INSTRUCTIONS
Please be informed that if you contact our office outside of normal business hours the physician on call cannot help with any scheduling or rescheduling issues, procedure instruction questions or any type of medication issue. We advise you for any urgent/emergency that you go to the nearest emergency room! PLEASE CALL OUR OFFICE DURING NORMAL BUSINESS HOURS    Monday - Friday   8 am to 5 pm    [place] [address]: [phone]    [place]:  [phone]    **It is YOUR responsibilty to bring medication bottles and/or updated medication list to [address] [place] [address].  This will allow us to better serve you and all your healthcare needs**

## 2022-03-30 ENCOUNTER — PROCEDURE VISIT (OUTPATIENT)
Dept: CARDIOLOGY CLINIC | Age: 37
End: 2022-03-30
Payer: COMMERCIAL

## 2022-03-30 DIAGNOSIS — I21.4 NSTEMI (NON-ST ELEVATED MYOCARDIAL INFARCTION) (HCC): ICD-10-CM

## 2022-03-30 DIAGNOSIS — Z95.810 PRESENCE OF EXTERNAL CARDIAC DEFIBRILLATOR: ICD-10-CM

## 2022-03-30 DIAGNOSIS — I25.5 ISCHEMIC CARDIOMYOPATHY: ICD-10-CM

## 2022-03-30 DIAGNOSIS — Z79.01 ANTICOAGULATED ON COUMADIN: ICD-10-CM

## 2022-03-30 DIAGNOSIS — G45.9 TIA (TRANSIENT ISCHEMIC ATTACK): ICD-10-CM

## 2022-03-30 DIAGNOSIS — I63.429 CEREBROVASCULAR ACCIDENT (CVA) DUE TO EMBOLISM OF ANTERIOR CEREBRAL ARTERY, UNSPECIFIED BLOOD VESSEL LATERALITY (HCC): ICD-10-CM

## 2022-03-30 LAB
LV EF: 23 %
LVEF MODALITY: NORMAL

## 2022-03-30 PROCEDURE — 93306 TTE W/DOPPLER COMPLETE: CPT | Performed by: INTERNAL MEDICINE

## 2022-04-04 ENCOUNTER — TELEPHONE (OUTPATIENT)
Dept: CARDIOLOGY CLINIC | Age: 37
End: 2022-04-04

## 2022-04-05 NOTE — TELEPHONE ENCOUNTER
He needs ICD , I spoke to him  Continue Life vest , Please have Dr Neris Almaraz see him ASAP for ICD

## 2022-04-06 ENCOUNTER — TELEPHONE (OUTPATIENT)
Dept: CARDIOLOGY CLINIC | Age: 37
End: 2022-04-06

## 2022-04-06 NOTE — TELEPHONE ENCOUNTER
Summary   Dilated left ventricle with extensive regional wall motion abnormalities and   severe systolic impairment. EF is estimated at 20-25%. Grade II diastolic dysfunction. Septal and anterior wall segments are akinetic. Apical ballooning noted. Moderately dilated left atrium. Mild mitral and tricuspid regurgitation is present. RVSP is 31 mmHg. No evidence of pericardial effusion. Compared to previous echo on 8/2021,LV size and EF has minimally improved,   <20% to 20-25%. Called and notified patient that his heart function on his ECHO test result has improved since the previous study on 08/2021. Patient verbally understood.

## 2022-04-07 ENCOUNTER — ANTI-COAG VISIT (OUTPATIENT)
Dept: PHARMACY | Age: 37
End: 2022-04-07
Payer: COMMERCIAL

## 2022-04-07 DIAGNOSIS — I21.4 NSTEMI (NON-ST ELEVATED MYOCARDIAL INFARCTION) (HCC): Primary | ICD-10-CM

## 2022-04-07 DIAGNOSIS — G45.9 TIA (TRANSIENT ISCHEMIC ATTACK): ICD-10-CM

## 2022-04-07 LAB
INTERNATIONAL NORMALIZATION RATIO, POC: 3.8
POC INR: 3.8 INDEX
PROTHROMBIN TIME, POC: 45.5 SECONDS (ref 10–14.3)

## 2022-04-07 PROCEDURE — 36416 COLLJ CAPILLARY BLOOD SPEC: CPT

## 2022-04-07 PROCEDURE — 99212 OFFICE O/P EST SF 10 MIN: CPT

## 2022-04-07 PROCEDURE — 85610 PROTHROMBIN TIME: CPT

## 2022-04-07 NOTE — PROGRESS NOTES
Medication Management Service  [facility]  [phone]    Visit Date: 4/7/2022   Subjective:       Christie Reaves is a 29 y.o. male who presents to clinic today for anticoagulation monitoring and adjustment. Patient seen in clinic for warfarin management due to  Indication:   CVA and TIA. INR goal: of 2.0-2.5. Duration of therapy: indefinite. Patient reports the following:   Adherent with regimen  Missed or extra doses:  Took 8mg instead of 6mg yesterday  Bleeding or thromboembolic side effects:  None  Significant medication changes:  None  Significant dietary changes: None  Significant alcohol or tobacco changes: None  Significant recent illness, disease state changes, or hospitalization:  None  Upcoming surgeries or procedures:  None  Falls: None           Assessment and Plan     PT/INR done in office per protocol. INR today is 3.8, supratherapeutic. Plan: Hold warfarin today then  will continue current regimen of warfarin 6mg daily except 8mg on Tuesdays and Fridays. Recheck INR in 1 week(s). Patient verbalized understanding of dosing directions and information discussed. Dosing schedule given to patient including phone number, appointment date, and time. Progress note sent to referring office. Patient acknowledges working in consult agreement with pharmacist as referred by his/her physician.       Electronically signed by QUETA Rogers [facility] on 4/7/22 at 3:32 PM EDT    For Pharmacy Admin Tracking Only     Intervention Detail: Dose Adjustment: 1, reason: Therapy Optimization   Total # of Interventions Recommended: 1   Total # of Interventions Accepted: 1   Time Spent (min): 15

## 2022-04-08 ENCOUNTER — INITIAL CONSULT (OUTPATIENT)
Dept: CARDIOLOGY CLINIC | Age: 37
End: 2022-04-08
Payer: COMMERCIAL

## 2022-04-08 VITALS
BODY MASS INDEX: 26.51 KG/M2 | DIASTOLIC BLOOD PRESSURE: 60 MMHG | SYSTOLIC BLOOD PRESSURE: 100 MMHG | WEIGHT: 185.2 LBS | HEART RATE: 67 BPM | OXYGEN SATURATION: 96 % | HEIGHT: 70 IN

## 2022-04-08 DIAGNOSIS — I25.5 ISCHEMIC CARDIOMYOPATHY: Primary | ICD-10-CM

## 2022-04-08 PROCEDURE — 99244 OFF/OP CNSLTJ NEW/EST MOD 40: CPT | Performed by: INTERNAL MEDICINE

## 2022-04-08 PROCEDURE — 93000 ELECTROCARDIOGRAM COMPLETE: CPT | Performed by: INTERNAL MEDICINE

## 2022-04-08 NOTE — PATIENT INSTRUCTIONS
Please be informed that if you contact our office outside of normal business hours the physician on call cannot help with any scheduling or rescheduling issues, procedure instruction questions or any type of medication issue. We advise you for any urgent/emergency that you go to the nearest emergency room! PLEASE CALL OUR OFFICE DURING NORMAL BUSINESS HOURS    Monday - Friday   8 am to 5 pm    [place]: [address]: [phone]    [place]:  [phone]    **It is YOUR responsibilty to bring medication bottles and/or updated medication list to [address] [place] [address].  This will allow us to better serve you and all your healthcare needs**

## 2022-04-08 NOTE — PROGRESS NOTES
Electrophysiology Consult Note      Reason for consultation: Need for ICD    Chief complaint : Shortness of breath on moderate exertion - discuss ICD    Referring physician: Timo Moore      Primary care physician: Ifeanyi Heck MD      History of Present Illness:     Patient is a 46-year-old male with history of ischemic cardiomyopathy, CVA referred by Dr. Huang Rucker for ICD. Patient was initially hospitalized in August 2021 when he had aphasic kind of symptoms and he was noted to have CVA on echocardiogram he was noted to have akinetic apex with LVEF of 20%. Patient had a left heart cath and it showed an occluded LAD. Viability study showed infarcted apex and a viable anterior wall. Later on patient was referred for a  intervention by Dr. Digna Luna in December 2021. Patient has been taking medical therapy but still his LVEF has not improved significantly. Patient has LifeVest on. Patient reports off-and-on he gets palpitations. Patient is not dizzy or lightheaded. Patient reports that he has shortness of breath with moderate exertion but he is able to do regular activities without any discomfort. He denies any swelling in the legs, chest pain, dizziness or syncope. Patient reports he did have some chest pain in the month of August and he thought it was probably gastritis and he was taking medication for that but he did not think that it was cardiac pain and until he was told that his heart function was weak in August when he came with a stroke    He recovered fully from his CVA. Patient reports he is compliant with his medication and wants to see if his LV function will improve further.   He is not decided if he wants to have an ICD yet and he wants to know if he can continue the LifeVest for few more months to see if LVEF will improve    Patient is a  at [address] [place] [address] [employer] history:   Past Medical History:   Diagnosis Date    Anticoagulated on Coumadin     Aphasia 08/2021    Cerebrovascular accident ([facility]A) due to embolism ([facility] [address] 08/2021    Ischemic cardiomyopathy 08/2021    NSTEMI (non-ST elevated myocardial infarction) ([facility] [address] 08/2021    Palpitations     Presence of external cardiac defibrillator 08/27/2021    Currently wearing LifeVest.       Surgical history : Denies any recent surgeries. Family history: No history of sudden cardiac death    Social history :  reports that he quit smoking about 7 months ago. He smoked 0.50 packs per day prior to that. He has never used smokeless tobacco. He reports that he does not drink alcohol and does not use drugs. No Known Allergies    Current Outpatient Medications on File Prior to Visit   Medication Sig Dispense Refill    metoprolol succinate (TOPROL XL) 100 MG extended release tablet Take 1 tablet by mouth daily Do not crush or chew. HOLD FOR SBP LESS THAN 90 AND HR LESS THAN 60  Take 150 mg 90 tablet 3    lisinopril (PRINIVIL;ZESTRIL) 40 MG tablet Take 1 tablet by mouth daily HOLD FOR SBP LESS THAN 90 AND HR LESS THAN 60 90 tablet 3    metoprolol succinate (TOPROL XL) 50 MG extended release tablet Take 1 tablet by mouth daily Take 150 mg daily 30 tablet 3    warfarin (COUMADIN) 2 MG tablet Take 3 tablets by mouth daily On Mondays, Wednesdays, Thursdays, Saturdays, and Sundays. Take 4 tablets on Tuesdays and Fridays or as directed by office. 100 tablet 2    clopidogrel (PLAVIX) 75 MG tablet Take 75 mg by mouth daily      spironolactone (ALDACTONE) 25 MG tablet Take 1 tablet by mouth daily 90 tablet 3    aspirin 81 MG chewable tablet Take 1 tablet by mouth daily 90 tablet 3    atorvastatin (LIPITOR) 80 MG tablet Take 1 tablet by mouth nightly 90 tablet 3    folic acid-pyridoxine-cyancobalamin (FOLTX) 1.13-25-2 MG TABS Take 1 tablet by mouth daily (Patient not taking: Reported on 4/8/2022) 90 tablet 3     No current facility-administered medications on file prior to visit. Review of Systems:   Review of Systems   Constitutional: Negative for activity change, chills, fatigue and fever. HENT: Negative for congestion, ear pain and tinnitus. Eyes: Negative for photophobia, pain and visual disturbance. Respiratory: Positive for shortness of breath (with moderate exertion). Negative for cough, chest tightness and wheezing. Cardiovascular: Negative for chest pain, palpitations and leg swelling. Gastrointestinal: Negative for abdominal pain, blood in stool, constipation, diarrhea, nausea and vomiting. Endocrine: Negative for cold intolerance and heat intolerance. Genitourinary: Negative for dysuria, flank pain and hematuria. Musculoskeletal: Negative for arthralgias, back pain, myalgias and neck stiffness. Skin: Negative for color change and rash. Allergic/Immunologic: Negative for food allergies. Neurological: Negative for dizziness, light-headedness, numbness and headaches. Hematological: Does not bruise/bleed easily. Psychiatric/Behavioral: Negative for agitation, behavioral problems and confusion. Examination:      Vitals:    04/08/22 1023   BP: 100/60   Pulse: 67   SpO2: 96%   Weight: 185 lb 3.2 oz (84 kg)   Height: 5' 10\" (1.778 m)        Body mass index is 26.57 kg/m². Physical Exam  Constitutional:       Appearance: Normal appearance. He is not ill-appearing. HENT:      Head: Normocephalic and atraumatic. Mouth/Throat:      Mouth: Mucous membranes are moist.   Eyes:      Conjunctiva/sclera: Conjunctivae normal.   Cardiovascular:      Rate and Rhythm: Normal rate and regular rhythm. Heart sounds: Murmur (grade 2/6 systolic murmur) heard. Pulmonary:      Effort: Pulmonary effort is normal.      Breath sounds: No rales. Abdominal:      General: Abdomen is flat. Palpations: Abdomen is soft. Musculoskeletal:         General: No tenderness. Normal range of motion. Cervical back: Normal range of motion. Right lower leg: No edema. Left lower leg: No edema. Skin:     General: Skin is warm and dry. Neurological:      General: No focal deficit present. Mental Status: He is alert and oriented to person, place, and time. CBC:   Lab Results   Component Value Date    WBC 9.9 08/26/2021    HGB 16.2 08/26/2021    HCT 51.2 08/26/2021     08/26/2021     Lipids:  Lab Results   Component Value Date    CHOL 149 08/22/2021    TRIG 148 08/22/2021    HDL 28 (L) 08/22/2021    LDLDIRECT 94 08/22/2021     PT/INR:   Lab Results   Component Value Date    INR 3.8 04/07/2022    INR 3.8 04/07/2022    INR 1.4 12/30/2021        BMP:    Lab Results   Component Value Date     (L) 08/22/2021    K 4.2 08/22/2021    CL 99 08/22/2021    CO2 25 08/22/2021    BUN 12 08/22/2021     CMP:   Lab Results   Component Value Date    AST 30 08/22/2021    PROT 7.0 08/22/2021    BILITOT 0.4 08/22/2021    ALKPHOS 95 08/22/2021     TSH:  No results found for: TSH, T4    EKGINTERPRETATION - EKG Interpretation:   Sinus rhythm, old anterolateral MI    Echo 3/30/2022    Dilated left ventricle with extensive regional wall motion abnormalities and   severe systolic impairment. EF is estimated at 20-25%. Grade II diastolic dysfunction. Septal and anterior wall segments are akinetic. Apical ballooning noted. Moderately dilated left atrium. Mild mitral and tricuspid regurgitation is present. RVSP is 31 mmHg. No evidence of pericardial effusion. Compared to previous echo on 8/2021,LV size and EF has minimally improved,   <20% to 20-25%. IMPRESSION / RECOMMENDATIONS:     Ischemic cardiomyopathy  CVA on coumadin - CVA thought to be from LV thrombus possibility given akinetic apex    Patient is 29-year-old male with ischemic cardiomyopathy on metoprolol, lisinopril and spironolactone and referred for ICD.     Patient reports that he had initial chest pain feeling in August and then later a few days later he had stroke with the speech impairment and then it came back within 4 hours. Patient was noted to have  of the LAD and then on viability - anterior wall was viable so he was sent to Dr. Newsome Or at [facility] and he had a  intervention done in December. Patient still continues to have low EF and is on LifeVest.      I had a very detailed discussion on the patient about ischemic cardiomyopathy and risk of sudden death and need for ICD. Patient is a young patient and he wants to see if the heart function would improve some more in couple of months. Patient has not had cardiac rehab I would recommend cardiac rehab on the patient. Patient understands the risk with LifeVest and also delaying of ICD. I told him that he may be needing to pay for life vest out of pocket after 3 months as I am not sure if insurance will pay for it and patient was okay with that. I had extensive discussion with him about the cardiomyopathy in general and patient is very appreciative of it and he would like to continue with medical therapy and will think about ICD. Patient also discussed about what is his status if he wants to get  and I had discussion on that too and comforted patient and answered all his questions      Thanks again for allowing me to participate in care of this patient. Please call me if you have any questions. With best regards. New Chapa MD, 4/8/2022 10:29 AM     Please note this report has been partially produced using speech recognition software and may contain errors related to that system including errors in grammar, punctuation, and spelling, as well as words and phrases that may be inappropriate. If there are any questions or concerns please feel free to contact the dictating provider for clarification.

## 2022-04-13 ENCOUNTER — TELEPHONE (OUTPATIENT)
Dept: CARDIOLOGY CLINIC | Age: 37
End: 2022-04-13

## 2022-04-13 NOTE — TELEPHONE ENCOUNTER
Pt asking if we would be able to help him file for disability based on his condition we are treating.

## 2022-04-13 NOTE — TELEPHONE ENCOUNTER
Spoke with patient, he is still working and plans to continue. 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

## 2022-04-14 ENCOUNTER — TELEPHONE (OUTPATIENT)
Dept: PHARMACY | Age: 37
End: 2022-04-14

## 2022-04-14 DIAGNOSIS — I50.9 CHRONIC HEART FAILURE, UNSPECIFIED HEART FAILURE TYPE (HCC): Primary | ICD-10-CM

## 2022-04-14 NOTE — TELEPHONE ENCOUNTER
Rescheduled for 4/18.  He will come after appointment with Dr. Jyoti Larson if able or later at 5:00pm if not done by 10:45am.     For Pharmacy Admin Tracking Only     Intervention Detail:    Total # of Interventions Recommended:    Total # of Interventions Accepted:    Time Spent (min): 15

## 2022-04-18 ENCOUNTER — ANTI-COAG VISIT (OUTPATIENT)
Dept: PHARMACY | Age: 37
End: 2022-04-18
Payer: COMMERCIAL

## 2022-04-18 ENCOUNTER — TELEPHONE (OUTPATIENT)
Dept: CARDIOLOGY CLINIC | Age: 37
End: 2022-04-18

## 2022-04-18 ENCOUNTER — OFFICE VISIT (OUTPATIENT)
Dept: CARDIOLOGY CLINIC | Age: 37
End: 2022-04-18
Payer: COMMERCIAL

## 2022-04-18 VITALS
SYSTOLIC BLOOD PRESSURE: 94 MMHG | HEIGHT: 70 IN | WEIGHT: 186 LBS | DIASTOLIC BLOOD PRESSURE: 68 MMHG | BODY MASS INDEX: 26.63 KG/M2 | HEART RATE: 64 BPM

## 2022-04-18 DIAGNOSIS — G45.9 TIA (TRANSIENT ISCHEMIC ATTACK): ICD-10-CM

## 2022-04-18 DIAGNOSIS — R47.01 APHASIA: ICD-10-CM

## 2022-04-18 DIAGNOSIS — I63.429 CEREBROVASCULAR ACCIDENT (CVA) DUE TO EMBOLISM OF ANTERIOR CEREBRAL ARTERY, UNSPECIFIED BLOOD VESSEL LATERALITY (HCC): ICD-10-CM

## 2022-04-18 DIAGNOSIS — Z95.810 PRESENCE OF EXTERNAL CARDIAC DEFIBRILLATOR: ICD-10-CM

## 2022-04-18 DIAGNOSIS — I21.4 NSTEMI (NON-ST ELEVATED MYOCARDIAL INFARCTION) (HCC): Primary | ICD-10-CM

## 2022-04-18 LAB
INTERNATIONAL NORMALIZATION RATIO, POC: 2.4
POC INR: 2.4 INDEX
PROTHROMBIN TIME, POC: 28.4 SECONDS (ref 10–14.3)

## 2022-04-18 PROCEDURE — 99214 OFFICE O/P EST MOD 30 MIN: CPT | Performed by: INTERNAL MEDICINE

## 2022-04-18 PROCEDURE — 99212 OFFICE O/P EST SF 10 MIN: CPT

## 2022-04-18 PROCEDURE — 85610 PROTHROMBIN TIME: CPT

## 2022-04-18 PROCEDURE — 36416 COLLJ CAPILLARY BLOOD SPEC: CPT

## 2022-04-18 RX ORDER — WARFARIN SODIUM 2 MG/1
6 TABLET ORAL DAILY
Qty: 100 TABLET | Refills: 2 | Status: SHIPPED | OUTPATIENT
Start: 2022-04-18 | End: 2022-05-26 | Stop reason: SDUPTHER

## 2022-04-18 NOTE — PROGRESS NOTES
Medication Management Service  [facility]  [phone]    Visit Date: 4/18/2022   Subjective:       Andi Eckert is a 29 y.o. male who presents to clinic today for anticoagulation monitoring and adjustment. Patient seen in clinic for warfarin management due to  Indication:   CVA and TIA. INR goal: of 2.0-3.0. Duration of therapy: indefinite. Patient reports the following:   Adherent with regimen  Missed or extra doses:  None   Bleeding or thromboembolic side effects:  None  Significant medication changes:  None  Significant dietary changes: None  Significant alcohol or tobacco changes: None  Significant recent illness, disease state changes, or hospitalization:  None  Upcoming surgeries or procedures:  ICD procedure not scheduled yet  Falls: None           Assessment and Plan     PT/INR done in office per protocol. INR today is 2.4, therapeutic. Took 6mg instead of 8mg on Friday  Plan:  Decrease weekly dose ~4% to warfarin 6mg daily except 8mg on Tuesdays. Patient to call when procedure scheduled      Recheck INR in 1 week(s). Patient verbalized understanding of dosing directions and information discussed. Dosing schedule given to patient including phone number, appointment date, and time. Progress note sent to referring office. Patient acknowledges working in consult agreement with pharmacist as referred by his/her physician.       Electronically signed by Vannessa Oshea, [facility] on 4/18/22 at 12:19 PM EDT    For Pharmacy Admin Tracking Only     Intervention Detail: Dose Adjustment: 1, reason: Therapy Optimization   Total # of Interventions Recommended: 1   Total # of Interventions Accepted: 1   Time Spent (min): 15

## 2022-04-18 NOTE — PATIENT INSTRUCTIONS
Please be informed that if you contact our office outside of normal business hours the physician on call cannot help with any scheduling or rescheduling issues, procedure instruction questions or any type of medication issue. We advise you for any urgent/emergency that you go to the nearest emergency room! PLEASE CALL OUR OFFICE DURING NORMAL BUSINESS HOURS    Monday - Friday   8 am to 5 pm    [place]ena[place]isca Malik [address]: [phone]    [place]:  [phone]      **It is YOUR responsibilty to bring medication bottles and/or updated medication list to [address] [place] [address].  This will allow us to better serve you and all your healthcare needs**

## 2022-04-18 NOTE — TELEPHONE ENCOUNTER
Patient in office for OV with Dr. Herb Renteria. Patient requests medical assistance necessity letter. Letter complete and give to patient. Advised to call with further needs.

## 2022-04-18 NOTE — PROGRESS NOTES
CARDIOLOGY  NOTE    Chief Complaint: CHF/ cardiomyopathy /ASCVD    HPI:   Janyce Peabody is a 29y.o. year old who has Past medical history as noted below. Janyce Peabody had  LAD  intervention by Dr Ceferino Steiner in Dec 2021 . t he is still being cautious and is avoiding stress he is working form home. He is not sob , sometimes he gest palpitations  HE is still wearing his life vest , Repeat echo in march 2022 showed EF of 20-25 % He was advised to get ICD and was evaluated by Dr Jeannine Adams , they decided to hold off on ICD for few more months  Unfortunately life vest is 4200 $  And he cannot afford it anymore and wants to take it off   He was hospitalized in Aug 2021  after recent CVA causing aphasia but is recovered from his CVA. During work-up he was also found to have severe cardiomyopathy with ejection fraction of 20% with akinetic apex and septal wall. Cardiac cath revealed occluded LAD. Viability study shows infarcted apex but viable anterior wall. HE is going to se Dr Nicole Burgos for PCP   He is tolerating the meds well HE is not dizzy or lightheaded . He says his breathing is okay he is tolerating meds he was started on guideline recommended medical therapy he has no ankle swelling or shortness of breath he was started on Coumadin due to concerns for possible embolic event from dilated cardiomyopathy. 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 Current Outpatient Medications   Medication Sig Dispense Refill    metoprolol succinate (TOPROL XL) 100 MG extended release tablet Take 1 tablet by mouth daily Do not crush or chew.  HOLD FOR SBP LESS THAN 90 AND HR LESS THAN 60  Take 150 mg 90 tablet 3    lisinopril (PRINIVIL;ZESTRIL) 40 MG tablet Take 1 tablet by mouth daily HOLD FOR SBP LESS THAN 90 AND HR LESS THAN 60 90 tablet 3    metoprolol succinate (TOPROL XL) 50 MG extended release tablet Take 1 tablet by mouth daily Take 150 mg daily 30 tablet 3    warfarin (COUMADIN) 2 MG tablet Take 3 tablets by mouth daily On , , , , and Sundays. Take 4 tablets on  and  or as directed by office. 100 tablet 2    clopidogrel (PLAVIX) 75 MG tablet Take 75 mg by mouth daily      spironolactone (ALDACTONE) 25 MG tablet Take 1 tablet by mouth daily 90 tablet 3    aspirin 81 MG chewable tablet Take 1 tablet by mouth daily 90 tablet 3    atorvastatin (LIPITOR) 80 MG tablet Take 1 tablet by mouth nightly 90 tablet 3    folic acid-pyridoxine-cyancobalamin (FOLTX) 1.13-25-2 MG TABS Take 1 tablet by mouth daily (Patient not taking: Reported on 2022) 90 tablet 3     No current facility-administered medications for this visit. Allergies:   Patient has no known allergies. Patient History:  Past Medical History:   Diagnosis Date    Anticoagulated on Coumadin     Aphasia 2021    Cerebrovascular accident (CVA) due to embolism ([facility] [address] 2021    Ischemic cardiomyopathy 2021    NSTEMI (non-ST elevated myocardial infarction) ([facility] [address] 2021    Palpitations     Presence of external cardiac defibrillator 2021    Currently wearing LifeVest.     History reviewed. No pertinent surgical history. History reviewed. No pertinent family history. Social History     Tobacco Use    Smoking status: Former Smoker     Packs/day: 0.50     Start date: 2021     Quit date: 2021     Years since quittin.6    Smokeless tobacco: Never Used   Substance Use Topics    Alcohol use: Never        Review of Systems:   · Constitutional: No Fever or Weight Loss   · Eyes: No Decreased Vision  · ENT: No Headaches, Hearing Loss or Vertigo  · Cardiovascular: as per note above   · Respiratory: No cough or wheezing and as per note above.    · Gastrointestinal: No abdominal pain, appetite loss, blood in stools, constipation, diarrhea or heartburn  · Genitourinary: No dysuria, trouble voiding, or hematuria  · Musculoskeletal:  denies any new  joint aches , swelling  or pain   · Integumentary: No rash or pruritis  · Neurological: No TIA or stroke symptoms  · Psychiatric: No anxiety or depression  · Endocrine: No malaise, fatigue or temperature intolerance  · Hematologic/Lymphatic: No bleeding problems, blood clots or swollen lymph nodes  · Allergic/Immunologic: No nasal congestion or hives    Objective:      Physical Exam:  BP 94/68 (Site: Left Upper Arm, Position: Sitting, Cuff Size: Small Adult)   Pulse 64   Ht 5' 10\" (1.778 m)   Wt 186 lb (84.4 kg)   BMI 26.69 kg/m²   Wt Readings from Last 3 Encounters:   04/18/22 186 lb (84.4 kg)   04/08/22 185 lb 3.2 oz (84 kg)   03/21/22 187 lb 12.8 oz (85.2 kg)     Body mass index is 26.69 kg/m². Vitals:    04/18/22 1025   BP: 94/68   Pulse: 64        General Appearance:  No distress, conversant  Constitutional:  Well developed, Well nourished, No acute distress, Non-toxic appearance. HENT:  Normocephalic, Atraumatic, Bilateral external ears normal, Oropharynx moist, No oral exudates, Nose normal. Neck- Normal range of motion, No tenderness, Supple, No stridor,no apical-carotid delay  Eyes:  PERRL, EOMI, Conjunctiva normal, No discharge. Respiratory:  Normal breath sounds, No respiratory distress, No wheezing, No chest tenderness. ,no use of accessory muscles, NO crackles  Cardiovascular: (PMI) apex non displaced,no lifts no thrills,S1 and S2 audible, No added heart sounds, No signs of ankle edema, or volume overload, No evidence of JVD, No crackles  GI:  Bowel sounds normal, Soft, No tenderness, No masses, No gross visceromegaly   :  No costovertebral angle tenderness   Musculoskeletal:  No edema, no tenderness, no deformities.  Back- no tenderness  Integument:  Well hydrated, no rash   Lymphatic:  No lymphadenopathy noted   Neurologic:  Alert & oriented x 3, CN 2-12 normal, normal motor function, normal sensory function, no focal deficits noted Psychiatric:  Speech and behavior appropriate       Medical decision making and Data review:  DATA:  Lab Results   Component Value Date    TROPONINT 2.320 (Magan Ortiz) 08/22/2021     BNP:  No results found for: PROBNP  PT/INR:  No results found for: PTINR  No results found for: LABA1C  Lab Results   Component Value Date    CHOL 149 08/22/2021    TRIG 148 08/22/2021    HDL 28 (L) 08/22/2021    LDLDIRECT 94 08/22/2021     Lab Results   Component Value Date    ALT 30 08/22/2021    AST 30 08/22/2021     No results for input(s): WBC, HGB, HCT, MCV, PLT in the last 72 hours. TSH: No results found for: TSH  Lab Results   Component Value Date    AST 30 08/22/2021    ALT 30 08/22/2021    BILITOT 0.4 08/22/2021    ALKPHOS 95 08/22/2021     No results found for: PROBNP  No results found for: LABA1C  Lab Results   Component Value Date    WBC 9.9 08/26/2021    HGB 16.2 08/26/2021    HCT 51.2 08/26/2021     08/26/2021     Cath 8/23/21  LMCA: Normal.     LAD: Abnormal.100% occluded prox LAD with Collaterals from Rt filling the LAD     LCx: Moderate Lumen Irregularity. Right dominance noted. OM1 prox 50% stenosis     RCA: Normal.Right dominance noted. Rt to Left Collaterals noted to LAD territory     Echo 8/23/21   Summary   Left ventricular systolic function is abnormal.   Ejection fraction is visually estimated at <20%. Apical, septal, and anterior wall segments are akinetic. Apical ballooning noted. Severly dilated left ventricle measuring 7.0cm in diastole. No evidence of any pericardial effusion. Repeat study with Definity to rule out LV thrombus             All labs, medications and tests reviewed by myself including data and history from outside source , patient and available family . Assessment & Plan:      1. NSTEMI (non-ST elevated myocardial infarction) ([facility]    2. Presence of external cardiac defibrillator    3. TIA (transient ischemic attack)    4.  Cerebrovascular accident (CVA) due to embolism of anterior cerebral artery, unspecified blood vessel laterality ([facility] [address]    5. Aphasia         ASCVD  S/p PCI of LAD in Dec 2021 continue DAPT ,will stop aspirin eventually continue plavix , He is on coumadin due to concern for CVA that he had probably related to cardiomyopathy       Ischemic cardiomyopathy   History of 20% wearing LifeVest continue to titrate up medication increase Toprol- mg daily and increase  lisinopril 20 mg daily. Anterior wall was  viable , He had  LAD intervention  In Dec 15 th , Echo in march 2022  shows Ef of 20-25 % he was seen by EPS and ICD was decided to be delayed for few months in hope that EF will improve further , I aske [facility] to continue life vest and will request Dr Greta Frederick to reevaluate for ICd  [place] scan in Feb 2022   Plan had Acute CVA probably secondary to embolic event due to LV function possible source of embolic event continue Coumadin keep INR between 2-2.5 followed to Coumadin clinic    Cerebrovascular accident (CVA) due to embolism ([facility] [address]  Continue Coumadin    Cysticercosis  Evaluated by ID ,NO antibiotics needed      Dyslipidemia :  All available lab work was reviewed. Patient was advised to repeat lab work before next visit. Necessary orders were placed , instructions given by myself       Counseled extensively and medication compliance urged. We discussed that for the  prevention of ASCVD our  goal is aggressive risk modification. Patient is encouraged to exercise if they can , educated about  brisk walk for 30 minutes  at least 3 to 4 times a week if there are no physical limitations  Various goals were discussed and questions answered. Continue current medications. Appropriate prescriptions are addressed and refills ordered. Questions answered and patient verbalizes understanding. Call for any problems, questions, or concerns. Greater than 60 % of time spent counseling besides reviewing data and images     Continue all other medications of all above medical condition listed as is. Return in about 3 months (around 7/18/2022). Please note this report has been partially produced using speech recognition software and may contain errors related to that system including errors in grammar, punctuation, and spelling, as well as words and phrases that may be inappropriate. If there are any questions or concerns please feel free to contact the dictating provider for clarification.

## 2022-04-22 ENCOUNTER — TELEPHONE (OUTPATIENT)
Dept: CARDIOLOGY CLINIC | Age: 37
End: 2022-04-22

## 2022-04-22 NOTE — TELEPHONE ENCOUNTER
Spoke with patient regarding lifevest as he had received a bill. Vibha Duckworth requested that the patient call with updated insurance information. Patient voiced understanding. Patient has a question as he states that Dr. Milo Shin had told him to start Cardiac Rehab but Dr. Shama Russell told him that he may need to hold off until after ICD is placed. Patient is scheduled to start Rehab 4/27/22 and would like clarification as he doesn't want to start if it is not safe. Call to [phone] RN to discuss with Dr. Milo Shin.

## 2022-04-22 NOTE — TELEPHONE ENCOUNTER
Tried to return phone call to patient. Did discuss with Dr Sallye Dandy and per Dr Sallye Dandy talked with Dr Jason Ryan and patient is no longer wearing life vest so Dr Sallye Dandy requesting an appointment for patient to further discuss ICD. No answer and unable to leave voicemail as no voicemail picked up. Will try patient again at a later time.

## 2022-04-25 ENCOUNTER — TELEPHONE (OUTPATIENT)
Dept: CARDIOLOGY CLINIC | Age: 37
End: 2022-04-25

## 2022-04-25 ENCOUNTER — ANTI-COAG VISIT (OUTPATIENT)
Dept: PHARMACY | Age: 37
End: 2022-04-25
Payer: COMMERCIAL

## 2022-04-25 DIAGNOSIS — I21.4 NSTEMI (NON-ST ELEVATED MYOCARDIAL INFARCTION) (HCC): Primary | ICD-10-CM

## 2022-04-25 DIAGNOSIS — G45.9 TIA (TRANSIENT ISCHEMIC ATTACK): ICD-10-CM

## 2022-04-25 LAB
INTERNATIONAL NORMALIZATION RATIO, POC: 3.9
POC INR: 3.9 INDEX
PROTHROMBIN TIME, POC: 46.7 SECONDS (ref 10–14.3)

## 2022-04-25 PROCEDURE — 85610 PROTHROMBIN TIME: CPT

## 2022-04-25 PROCEDURE — 36416 COLLJ CAPILLARY BLOOD SPEC: CPT

## 2022-04-25 PROCEDURE — 99212 OFFICE O/P EST SF 10 MIN: CPT

## 2022-04-25 NOTE — TELEPHONE ENCOUNTER
Returned phone call to patient and follow up scheduled as requested by Dr Reese Dominique for Wednesday 4/27/2022 at 2:30 pm. Patient states he will cancel cardiac rehab until after discussion with Dr Reese Dominique. Did ask patient to call the office with any further questions or concerns. Patient voiced understanding.

## 2022-04-25 NOTE — TELEPHONE ENCOUNTER
Patient returned your call to sched ov with Dr Parris Zarco, please call patient Preoperative History and Physical  Chief Complaint   Patient presents with   • Pre-Op Exam     Surgery 1/30/2020 Dr Kingsley  - hammer toe on left foot.     • Refill Request     [place]          SURGEON:  Dr. Nisreen Kingsley     PROCEDURE:  Left foot surgery     DATE OF PROCEDURE:  1/30/2020    FAC[place]  [facility] [place]    HISTORY OF PRESENT ILLNESS:  The patient is a 73 year old male, who is being evaluated preoperatively at the request of . The patient states having increased left foot pain, that's affecting his daily activity. States the foot pain increases when pressure is applied. The Ulcer on his left foot is healed. The patient denies chest pain, shortness of breath, palpitations, headaches or lower extremity edema.     ALLERGIES:    ALLERGIES:  No Known Allergies    CURRENT MEDICATIONS:    Current Outpatient Medications   Medication Sig Dispense Refill   • Omega-3 Fatty Acids (FISH OIL) 1000 MG capsule TAKE 2 CAPSULES BY MOUTH 2 TIMES DAILY. 360 capsule 1   • Cholecalciferol (VITAMIN D3) Liquid Take 2 drops by mouth daily.     • HYDROcodone-acetaminophen (NORCO) 7.5-325 MG per tablet Take 1 tablet by mouth every 6 hours as needed for Pain. 120 tablet 0   • metoPROLOL tartrate (LOPRESSOR) 25 MG tablet Take 1 tablet by mouth every 12 hours. 180 tablet 1   • tamsulosin (FLOMAX) 0.4 MG Cap TAKE 1 CAPSULE BY MOUTH DAILY AFTER FOOD 90 capsule 1   • rivaroxaban (XARELTO) 20 MG Tab Take 1 tablet by mouth daily (with dinner). 90 tablet 3   • aspirin 81 MG tablet Take 81 mg by mouth daily.      • tetrahydrozoline (VISINE) 0.05 % ophthalmic solution Place 1 drop into both eyes as needed.     • amoxicillin (AMOXIL) 500 MG capsule 2gram 1 hour before dental procedure 16 capsule 0     No current facility-administered medications for this visit.        MEDICAL HISTORY:    Past Medical History:   Diagnosis Date   • Arthritis    • CAD S/P percutaneous coronary angioplasty distal circumflex 10/12/2019     6./11/14CATH:S/PPTCA distal circumflex: EF normal.  Paroxysmal A. fib .On  ASA,BB+Xarelto   • Chronic a-fib 5/19/2015 6./11/14CATH:S/PPTCA distal circumflex: EF normal.  On  BB+Xarelto   • Diabetic neuropathy, type II diabetes mellitus (CMS/HCC)     Involving bilateral foot 10-20% remaining sensation.   • Essential hypertension, benign    • Hyperlipemia    • Long term (current) use of anticoagulants 5/20/2015     Paroxysmal A. fib .6./11/14CATH:S/P PTCA distal circumflex: EF normal.  On  BB+Xarelto   • Metabolic syndrome    • Obesity    • Obstructive sleep apnea on CPAP 12/14/2016   • Paroxysmal atrial fibrillation (CMS/HCC) 5/19/2015 6./11/14CATH:S/PPTCA distal circumflex: EF normal.  On  BB+Xarelto   • Steatohepatitis, non-alcoholic    • Toe ulcer, left, with unspecified severity (CMS/HCC)-resolved 10/12/2019    Chronic ulceration of second digit, left foot -resolved    • Type 2 diabetes, diet controlled (CMS/HCC)     8./15/19, A1c 5.2.  Lost about 130 pounds from 7218-8018.   • Wears glasses        SURGICAL HISTORY:    Past Surgical History:   Procedure Laterality Date   • Cardiac catheterization/possible ptca/possible stent  6/17/14   • Colonoscopy remove lesion by snare  09/20/2011   • Eye surgery      Left    • Hip surgery     • Joint replacement     • Rotator cuff repair  about 14 years ago    Bilateral   • Tonsillectomy and adenoidectomy     • Total knee replacement  2012    Bilateral (couple months in between)       FAMILY HISTORY:    Family History   Problem Relation Age of Onset   • Heart Father    • Alcohol Abuse Father    • Emphysema Mother    • Heart Mother        SOCIAL HISTORY:    Social History     Tobacco Use   • Smoking status: Never Smoker   • Smokeless tobacco: Never Used   Substance Use Topics   • Alcohol use: Yes     Alcohol/week: 0.0 standard drinks     Comment: rare   • Drug use: No       REVIEW OF SYSTEMS:  Constitutional:  Denies fevers.  Denies chills.  Denies tiredness or malaise.    Eyes:  Denies change in visual acuity.  Denies eye pain.  Denies eye burning.  Denies eye itching.   Immunologic:  Denies hives, seasonal allergies.   HENT:  Denies sinus problems.  Denies ear infections.  Denies nasal congestion. Denies nose bleeding.  Denies gingival bleeding.  Denies sore throat.   Respiratory:  Denies cough, shortness of breath.  Denies history of problems with intubation or anesthesia.  Cardiovascular:  Denies chest pain, edema.   Gastrointestinal:  Denies abdominal pain, nausea, vomiting, bloody or dark stools, diarrhea.  Genitourinary:  Denies urine retention, painful urination, urinary frequency, blood in urine or nocturia.   Musculoskeletal:  Denies back pain, neck pain, joint pain or leg swelling. Positive for left foot pain  Integument:  Denies rash, itching.   Neurologic:  Denies headache, focal weakness or sensory changes.   Endocrine:  Denies polyuria, polydipsia or temperature intolerance.   Lymphatic:  Denies swollen glands, weight loss.  All other systems reviewed and negative.    PHYSICAL EXAM    VITAL SIGNS:    Vitals:    01/16/20 0836   BP: 138/88   Pulse: 62   Temp: 96.4 °F (35.8 °C)   SpO2: 98%   Weight: 123.4 kg   Height: 6' (1.829 m)     Constitutional:  A+O (Alert and oriented) x3.  In NAD (no acute distress).  HENT:  Normocephalic.  Atraumatic.  Bilateral external ears normal.  Oropharynx moist.  No oral exudates.  No tonsillar or uvular edema.  Nose normal.   Neck:  Normal range of motion.  No tenderness.  Supple.  No stridor.    Eyes:  PERRL (Pupils equal, round, reactive to light), EOMI (extraocular movements intact).  Conjunctivae normal.  No discharge.    Cardiovascular:  Irregular rate rhythm.  No murmurs, gallops, or rubs.    Respiratory:  No respiratory distress.  Normal breath sounds.  No rales.  No wheezing.    Gastrointestinal:  Bowel sounds normal.  Soft.  No tenderness.  No masses.  No pulsatile masses.    Integument:  Warm.  Dry.  No erythema.  No rash.     Musculoskeletal:  Intact distal pulses.  No edema.  No tenderness.  No cyanosis.  No clubbing.  Good range of motion in all major joints.  No tenderness to palpation or major deformities noted.  Back - No tenderness.    Neurologic:  Alert and oriented x3.  Normal motor function.  Normal sensory function. No focal deficits noted.        Assessment    73 year old male with planned surgery as above.        plan      1. Non-pressure chronic ulcer of other part of left foot limited to breakdown of skin (CMS/HCC)  Stable. He is seeing Podiatry     2. Type 2 diabetes mellitus with foot ulcer (CODE) (CMS/HCC)  Stable. Controlled with Diet and Exercise.   He was sent for lab work, we will call with the final results and recommendations     3. Permanent atrial fibrillation  Continue Xarelto 20 mg daily including Metoprolol 25 mg  BID.  Patient will be off the Xarelto 2 days prior to the procedure    4. Patient is at intermediate risk going for low risk procedure therefore he is medically cleared to go ahead with the procedure    This is Ana Thompson MA acting as a scribe for Dr. Carson Castillo    The documentation recorded by the scribe accurately and completely reflects the service(s) I personally performed and the decisions made by me.       CC:  Dr. Nisreen Kingsley

## 2022-04-25 NOTE — PROGRESS NOTES
Medication Management Service  [facility]  [phone]    Visit Date: 4/25/2022   Subjective:       Nomi Martinez is a 29 y.o. male who presents to clinic today for anticoagulation monitoring and adjustment. Patient seen in clinic for warfarin management due to  Indication:   CVA and TIA. INR goal: of 2.0-2.5. Duration of therapy: indefinite. Patient reports the following:   Adherent with regimen  Missed or extra doses:  None   Bleeding or thromboembolic side effects:  None  Significant medication changes:  None  Significant dietary changes: None  Significant alcohol or tobacco changes: None  Significant recent illness, disease state changes, or hospitalization:  None  Upcoming surgeries or procedures:  None  Falls: None           Assessment and Plan     PT/IN3R done in office per protocol. INR today is 3.9, supratherapeutic. Procedure not scheduled, will see Dr. Darlene Cuevas 4/27  Plan:  Hold warfarin today then decrease weekly dose ~5% to warfarin 6mg aily. Recheck INR in 1 week(s). Patient verbalized understanding of dosing directions and information discussed. Dosing schedule given to patient including phone number, appointment date, and time. Progress note sent to referring office. Patient acknowledges working in consult agreement with pharmacist as referred by his/her physician.       Electronically signed by Trinh Ledbetter [facility] on 4/25/22 at 5:16 PM EDT    For Pharmacy Admin Tracking Only     Intervention Detail: Dose Adjustment: 1, reason: Therapy Optimization   Total # of Interventions Recommended: 1   Total # of Interventions Accepted: 1   Time Spent (min): 15

## 2022-04-27 ENCOUNTER — TELEPHONE (OUTPATIENT)
Dept: CARDIOLOGY CLINIC | Age: 37
End: 2022-04-27

## 2022-04-27 ENCOUNTER — OFFICE VISIT (OUTPATIENT)
Dept: CARDIOLOGY CLINIC | Age: 37
End: 2022-04-27
Payer: COMMERCIAL

## 2022-04-27 VITALS
WEIGHT: 184 LBS | HEART RATE: 57 BPM | DIASTOLIC BLOOD PRESSURE: 60 MMHG | OXYGEN SATURATION: 98 % | SYSTOLIC BLOOD PRESSURE: 90 MMHG | HEIGHT: 70 IN | BODY MASS INDEX: 26.34 KG/M2 | RESPIRATION RATE: 12 BRPM

## 2022-04-27 DIAGNOSIS — I25.5 ISCHEMIC CARDIOMYOPATHY: Primary | ICD-10-CM

## 2022-04-27 PROCEDURE — 99213 OFFICE O/P EST LOW 20 MIN: CPT | Performed by: INTERNAL MEDICINE

## 2022-04-27 PROCEDURE — 93000 ELECTROCARDIOGRAM COMPLETE: CPT | Performed by: INTERNAL MEDICINE

## 2022-04-27 NOTE — PROGRESS NOTES
Electrophysiology fu Note      Reason for consultation: Need for ICD    Chief complaint : Shortness of breath on moderate exertion - discuss ICD    Referring physician: Breann Henderson      Primary care physician: Errol Batres MD      History of Present Illness: This visit (4/27/2022)      Chief Complaint   Patient presents with    Cardiomyopathy     Patient is here to discuss possible device. Patient denies chest pain, shortness of breath at rest, dizziness, palpitations, and edema. Other wise not much different from last visit. Patient reports that his insurance is no longer paying for Life vest. Patient wants to consider ICD. He discussed with Corinne Palmer and he feels its time for him now           Previous visit:    Patient is a 28-year-old male with history of ischemic cardiomyopathy, CVA referred by Dr. Shama Russell for ICD. Patient was initially hospitalized in August 2021 when he had aphasic kind of symptoms and he was noted to have CVA on echocardiogram he was noted to have akinetic apex with LVEF of 20%. Patient had a left heart cath and it showed an occluded LAD. Viability study showed infarcted apex and a viable anterior wall. Later on patient was referred for a  intervention by Dr. Eamon Bob in December 2021. Patient has been taking medical therapy but still his LVEF has not improved significantly. Patient has LifeVest on. Patient reports off-and-on he gets palpitations. Patient is not dizzy or lightheaded. Patient reports that he has shortness of breath with moderate exertion but he is able to do regular activities without any discomfort. He denies any swelling in the legs, chest pain, dizziness or syncope.     Patient reports he did have some chest pain in the month of August and he thought it was probably gastritis and he was taking medication for that but he did not think that it was cardiac pain and until he was told that his heart function was weak in August when he came with a stroke    He recovered fully from his CVA. Patient reports he is compliant with his medication and wants to see if his LV function will improve further. He is not decided if he wants to have an ICD yet and he wants to know if he can continue the LifeVest for few more months to see if LVEF will improve    Patient is a  at [employer] history:   Past Medical History:   Diagnosis Date    Anticoagulated on Coumadin     Aphasia 08/2021    Cerebrovascular accident (CVA) due to embolism ([facility] [address] 08/2021    Ischemic cardiomyopathy 08/2021    NSTEMI (non-ST elevated myocardial infarction) ([facility] [address] 08/2021    Palpitations     Presence of external cardiac defibrillator 08/27/2021    Currently wearing LifeVest.       Surgical history : Denies any recent surgeries. Family history: No history of sudden cardiac death    Social history :  reports that he quit smoking about 7 months ago. He smoked 0.50 packs per day prior to that. He has never used smokeless tobacco. He reports that he does not drink alcohol and does not use drugs. No Known Allergies    Current Outpatient Medications on File Prior to Visit   Medication Sig Dispense Refill    warfarin (COUMADIN) 2 MG tablet Take 3 tablets by mouth daily Except take 4 tablets on Tuesdays or as directed 100 tablet 2    metoprolol succinate (TOPROL XL) 100 MG extended release tablet Take 1 tablet by mouth daily Do not crush or chew.  HOLD FOR SBP LESS THAN 90 AND HR LESS THAN 60  Take 150 mg 90 tablet 3    lisinopril (PRINIVIL;ZESTRIL) 40 MG tablet Take 1 tablet by mouth daily HOLD FOR SBP LESS THAN 90 AND HR LESS THAN 60 90 tablet 3    metoprolol succinate (TOPROL XL) 50 MG extended release tablet Take 1 tablet by mouth daily Take 150 mg daily 30 tablet 3    clopidogrel (PLAVIX) 75 MG tablet Take 75 mg by mouth daily      spironolactone (ALDACTONE) 25 MG tablet Take 1 tablet by mouth daily 90 tablet 3  aspirin 81 MG chewable tablet Take 1 tablet by mouth daily 90 tablet 3    atorvastatin (LIPITOR) 80 MG tablet Take 1 tablet by mouth nightly 90 tablet 3    folic acid-pyridoxine-cyancobalamin (FOLTX) 1.13-25-2 MG TABS Take 1 tablet by mouth daily (Patient not taking: Reported on 4/8/2022) 90 tablet 3     No current facility-administered medications on file prior to visit. Review of Systems:   Review of Systems   Constitutional: Negative for activity change, chills, fatigue and fever. HENT: Negative for congestion, ear pain and tinnitus. Eyes: Negative for photophobia, pain and visual disturbance. Respiratory: Positive for shortness of breath (with moderate exertion). Negative for cough, chest tightness and wheezing. Cardiovascular: Negative for chest pain, palpitations and leg swelling. Gastrointestinal: Negative for abdominal pain, blood in stool, constipation, diarrhea, nausea and vomiting. Endocrine: Negative for cold intolerance and heat intolerance. Genitourinary: Negative for dysuria, flank pain and hematuria. Musculoskeletal: Negative for arthralgias, back pain, myalgias and neck stiffness. Skin: Negative for color change and rash. Allergic/Immunologic: Negative for food allergies. Neurological: Negative for dizziness, light-headedness, numbness and headaches. Hematological: Does not bruise/bleed easily. Psychiatric/Behavioral: Negative for agitation, behavioral problems and confusion. Examination:      Vitals:    04/27/22 1431   BP: 90/60   Pulse: 57   Resp: 12   SpO2: 98%   Weight: 184 lb (83.5 kg)   Height: 5' 10\" (1.778 m)        Body mass index is 26.4 kg/m². Physical Exam  Constitutional:       Appearance: Normal appearance. He is not ill-appearing. HENT:      Head: Normocephalic and atraumatic.       Mouth/Throat:      Mouth: Mucous membranes are moist.   Eyes:      Conjunctiva/sclera: Conjunctivae normal.   Cardiovascular:      Rate and Rhythm: Normal rate and regular rhythm. Heart sounds: Murmur (grade 2/6 systolic murmur) heard. Pulmonary:      Effort: Pulmonary effort is normal.      Breath sounds: No rales. Abdominal:      General: Abdomen is flat. Palpations: Abdomen is soft. Musculoskeletal:         General: No tenderness. Normal range of motion. Cervical back: Normal range of motion. Right lower leg: No edema. Left lower leg: No edema. Skin:     General: Skin is warm and dry. Neurological:      General: No focal deficit present. Mental Status: He is alert and oriented to person, place, and time. CBC:   Lab Results   Component Value Date    WBC 8.8 05/02/2022    HGB 15.4 05/02/2022    HCT 49.3 05/02/2022     05/02/2022     Lipids:  Lab Results   Component Value Date    CHOL 149 08/22/2021    TRIG 148 08/22/2021    HDL 28 (L) 08/22/2021    LDLDIRECT 94 08/22/2021     PT/INR:   Lab Results   Component Value Date    INR 1.26 05/05/2022        BMP:    Lab Results   Component Value Date     05/02/2022    K 4.8 05/02/2022     05/02/2022    CO2 26 05/02/2022    BUN 8 05/02/2022     CMP:   Lab Results   Component Value Date    AST 30 08/22/2021    PROT 7.0 08/22/2021    BILITOT 0.4 08/22/2021    ALKPHOS 95 08/22/2021     TSH:  No results found for: TSH, T4    EKGINTERPRETATION - EKG Interpretation:   Sinus rhythm, old anterolateral MI    Echo 3/30/2022    Dilated left ventricle with extensive regional wall motion abnormalities and   severe systolic impairment. EF is estimated at 20-25%. Grade II diastolic dysfunction. Septal and anterior wall segments are akinetic. Apical ballooning noted. Moderately dilated left atrium. Mild mitral and tricuspid regurgitation is present. RVSP is 31 mmHg. No evidence of pericardial effusion. Compared to previous echo on 8/2021,LV size and EF has minimally improved,   <20% to 20-25%.         IMPRESSION / RECOMMENDATIONS:     Ischemic cardiomyopathy  CVA on coumadin - CVA thought to be from LV thrombus possibility given akinetic apex    Patient is 28-year-old male with ischemic cardiomyopathy on metoprolol, lisinopril and spironolactone and referred for ICD. Patient is on optimal medical therapy and he has been on therapy for more than 3 months and still his LVEF is is 20-25%     I had a very detailed discussion on the patient about ischemic cardiomyopathy and risk of sudden death and need for ICD. The risks including, but not limited to, the risks of vascular injury, bleeding, infection, device malfunction, lead dislodgement, radiation exposure, injury to cardiac and surrounding structures (including pneumothorax), stroke, myocardial infarction and death were discussed in detail. The patient opted to proceed with the device implantation. Thanks again for allowing me to participate in care of this patient. Please call me if you have any questions. With best regards. Radha Orellana MD, 5/5/2022 7:47 AM     Please note this report has been partially produced using speech recognition software and may contain errors related to that system including errors in grammar, punctuation, and spelling, as well as words and phrases that may be inappropriate. If there are any questions or concerns please feel free to contact the dictating provider for clarification.

## 2022-04-27 NOTE — TELEPHONE ENCOUNTER
This patient is scheduled for ICD Implant 5/5/2022 and will need to continue wearing the life vest until device is placed. Patient states the cost, after insurance, is over $4000.00/month. He already owes for last month and will need to pay an additional month to continue to wear the life vest until procedure.  Patient wanting to know if there is any patient assistance he could possibly apply for to assist with covering the cost of the life vest. Advised will forward to MICHELLE Toney to review and advise if there is any assistance to help patient with cost.

## 2022-04-27 NOTE — PATIENT INSTRUCTIONS
Covid test, procedure paperwork, and pre-testing scheduled on 5/2/2022 AT 11:30 am. Procedure scheduled with Dr. Parris Zarco on 5/5/2022 at [facility].

## 2022-04-28 ENCOUNTER — TELEPHONE (OUTPATIENT)
Dept: CARDIOLOGY CLINIC | Age: 37
End: 2022-04-28

## 2022-04-28 DIAGNOSIS — I25.5 ISCHEMIC CARDIOMYOPATHY: Primary | ICD-10-CM

## 2022-04-29 ENCOUNTER — TELEPHONE (OUTPATIENT)
Dept: CARDIOLOGY CLINIC | Age: 37
End: 2022-04-29

## 2022-04-29 NOTE — TELEPHONE ENCOUNTER
Pt called to inquire about obtaining the procedure code for his upcoming procedure. States the hospital said our nursing staff would have to provide that. Caterina thought Osmani Martínez may be able to get that answer. I instructed pt I would put another msg in and that staff does ask for a min of 48 business hours to return calls.

## 2022-05-02 ENCOUNTER — ANTI-COAG VISIT (OUTPATIENT)
Dept: PHARMACY | Age: 37
End: 2022-05-02
Payer: COMMERCIAL

## 2022-05-02 ENCOUNTER — HOSPITAL ENCOUNTER (OUTPATIENT)
Age: 37
Discharge: HOME | End: 2022-05-02
Payer: COMMERCIAL

## 2022-05-02 ENCOUNTER — HOSPITAL ENCOUNTER (OUTPATIENT)
Dept: GENERAL RADIOLOGY | Age: 37
Discharge: HOME | End: 2022-05-02
Payer: COMMERCIAL

## 2022-05-02 ENCOUNTER — HOSPITAL ENCOUNTER (OUTPATIENT)
Age: 37
Setting detail: SPECIMEN
Discharge: HOME | End: 2022-05-02
Payer: COMMERCIAL

## 2022-05-02 ENCOUNTER — NURSE ONLY (OUTPATIENT)
Dept: CARDIOLOGY CLINIC | Age: 37
End: 2022-05-02

## 2022-05-02 DIAGNOSIS — G45.9 TIA (TRANSIENT ISCHEMIC ATTACK): ICD-10-CM

## 2022-05-02 DIAGNOSIS — Z01.818 PRE-PROCEDURAL EXAMINATION: ICD-10-CM

## 2022-05-02 DIAGNOSIS — I21.4 NSTEMI (NON-ST ELEVATED MYOCARDIAL INFARCTION) (HCC): Primary | ICD-10-CM

## 2022-05-02 LAB
ANION GAP SERPL CALCULATED.3IONS-SCNC: 10 MMOL/L (ref 4–16)
APTT: 39.9 SECONDS (ref 25.1–37.1)
BASOPHILS ABSOLUTE: 0.1 K/CU MM
BASOPHILS RELATIVE PERCENT: 1.2 % (ref 0–1)
BUN BLDV-MCNC: 8 MG/DL (ref 6–23)
CALCIUM SERPL-MCNC: 9.3 MG/DL (ref 8.3–10.6)
CHLORIDE BLD-SCNC: 106 MMOL/L (ref 99–110)
CO2: 26 MMOL/L (ref 21–32)
CREAT SERPL-MCNC: 1.1 MG/DL (ref 0.9–1.3)
DIFFERENTIAL TYPE: ABNORMAL
EOSINOPHILS ABSOLUTE: 0.9 K/CU MM
EOSINOPHILS RELATIVE PERCENT: 10.3 % (ref 0–3)
GFR AFRICAN AMERICAN: >60 ML/MIN/1.73M2
GFR NON-AFRICAN AMERICAN: >60 ML/MIN/1.73M2
GLUCOSE BLD-MCNC: 86 MG/DL (ref 70–99)
HCT VFR BLD CALC: 49.3 % (ref 42–52)
HEMOGLOBIN: 15.4 GM/DL (ref 13.5–18)
IMMATURE NEUTROPHIL %: 0.3 % (ref 0–0.43)
INR BLD: 2.53 INDEX
INTERNATIONAL NORMALIZATION RATIO, POC: 3
LYMPHOCYTES ABSOLUTE: 2.7 K/CU MM
LYMPHOCYTES RELATIVE PERCENT: 30.8 % (ref 24–44)
MAGNESIUM: 2.2 MG/DL (ref 1.8–2.4)
MCH RBC QN AUTO: 27.8 PG (ref 27–31)
MCHC RBC AUTO-ENTMCNC: 31.2 % (ref 32–36)
MCV RBC AUTO: 89.2 FL (ref 78–100)
MONOCYTES ABSOLUTE: 0.7 K/CU MM
MONOCYTES RELATIVE PERCENT: 7.9 % (ref 0–4)
NUCLEATED RBC %: 0 %
PDW BLD-RTO: 12.5 % (ref 11.7–14.9)
PHOSPHORUS: 3.2 MG/DL (ref 2.5–4.9)
PLATELET # BLD: 225 K/CU MM (ref 140–440)
PMV BLD AUTO: 10.2 FL (ref 7.5–11.1)
POC INR: 3 INDEX
POTASSIUM SERPL-SCNC: 4.8 MMOL/L (ref 3.5–5.1)
PROTHROMBIN TIME, POC: 35.7 SECONDS (ref 10–14.3)
PROTHROMBIN TIME: 32.9 SECONDS (ref 11.7–14.5)
RBC # BLD: 5.53 M/CU MM (ref 4.6–6.2)
SEGMENTED NEUTROPHILS ABSOLUTE COUNT: 4.4 K/CU MM
SEGMENTED NEUTROPHILS RELATIVE PERCENT: 49.5 % (ref 36–66)
SODIUM BLD-SCNC: 142 MMOL/L (ref 135–145)
TOTAL IMMATURE NEUTOROPHIL: 0.03 K/CU MM
TOTAL NUCLEATED RBC: 0 K/CU MM
WBC # BLD: 8.8 K/CU MM (ref 4–10.5)

## 2022-05-02 PROCEDURE — 36415 COLL VENOUS BLD VENIPUNCTURE: CPT

## 2022-05-02 PROCEDURE — U0005 INFEC AGEN DETEC AMPLI PROBE: HCPCS

## 2022-05-02 PROCEDURE — 85610 PROTHROMBIN TIME: CPT

## 2022-05-02 PROCEDURE — 80048 BASIC METABOLIC PNL TOTAL CA: CPT

## 2022-05-02 PROCEDURE — 83735 ASSAY OF MAGNESIUM: CPT

## 2022-05-02 PROCEDURE — 71046 X-RAY EXAM CHEST 2 VIEWS: CPT

## 2022-05-02 PROCEDURE — 99211 OFF/OP EST MAY X REQ PHY/QHP: CPT

## 2022-05-02 PROCEDURE — 85025 COMPLETE CBC W/AUTO DIFF WBC: CPT

## 2022-05-02 PROCEDURE — 36416 COLLJ CAPILLARY BLOOD SPEC: CPT

## 2022-05-02 PROCEDURE — 85730 THROMBOPLASTIN TIME PARTIAL: CPT

## 2022-05-02 PROCEDURE — U0003 INFECTIOUS AGENT DETECTION BY NUCLEIC ACID (DNA OR RNA); SEVERE ACUTE RESPIRATORY SYNDROME CORONAVIRUS 2 (SARS-COV-2) (CORONAVIRUS DISEASE [COVID-19]), AMPLIFIED PROBE TECHNIQUE, MAKING USE OF HIGH THROUGHPUT TECHNOLOGIES AS DESCRIBED BY CMS-2020-01-R: HCPCS

## 2022-05-02 PROCEDURE — 84100 ASSAY OF PHOSPHORUS: CPT

## 2022-05-02 NOTE — PROGRESS NOTES
Medication Management Service  [facility]  [phone]    Visit Date: 5/2/2022   Subjective:       Marcus Sanchez is a 29 y.o. male who presents to clinic today for anticoagulation monitoring and adjustment. Patient seen in clinic for warfarin management due to  Indication:   CVA and TIA. INR goal: of 2.0-3.0. Duration of therapy: indefinite. Patient reports the following:   Adherent with regimen  Missed or extra doses:  None   Bleeding or thromboembolic side effects:  None  Significant medication changes:  None  Significant dietary changes: None  Significant alcohol or tobacco changes: None  Significant recent illness, disease state changes, or hospitalization:  None  Upcoming surgeries or procedures:  None  Falls: None           Assessment and Plan     PT/INR done in office per protocol. INR today is 3.0, therapeutic. Patient scheduled for procedure 5/5 and office of Dr. Aron Carvalho will call him with instructions after staff speaks with Dr. Aron Carvalho about INR today. Plan: Will continue current regimen of warfarin 6mg. Hold per instructions from Dr. Aron Carvalho. Recheck INR pending INR at procedure. Patient verbalized understanding of dosing directions and information discussed. Dosing schedule given to patient including phone number, appointment date, and time. Progress note sent to referring office. Patient acknowledges working in consult agreement with pharmacist as referred by his/her physician.       Electronically signed by Kwame Leon [facility] on 5/2/22 at 1:30 PM EDT    For Pharmacy Admin Tracking Only     Intervention Detail:    Total # of Interventions Recommended:    Total # of Interventions Accepted:    Time Spent (min): 15

## 2022-05-02 NOTE — PROGRESS NOTES
Patient here in office and educated on Dual Chamber Implantable Cardioverter Defibrillator Implant, schedule for 5/5/2022 @ 0730, with arrival @ [phone], @ [facility]; risk explained; and consents signed. Also copy of orders given for labs and CXR due 5/2/2022 at [facility]. Instruction given to patient to :  NPO after midnight the night before procedure; call hospital at [phone] to pre-register. May take rest of morning meds of procedure except listed. If taking Coumadin patient is to call the office after labs drawn, for results and instructions. Patient voiced understanding. Copies of consent & info scanned in chart. Patient informed of instructions and guidance for performing test.  Throat swab performed. Swab placed into labeled collection tube. Collection tube and lab order placed in plastic bag. Bag placed in biohazard bag and placed in fridge.  called for . Patient instructed to self quarantine until procedure.

## 2022-05-03 ENCOUNTER — TELEPHONE (OUTPATIENT)
Dept: CARDIOLOGY CLINIC | Age: 37
End: 2022-05-03

## 2022-05-03 LAB
SARS-COV-2: NOT DETECTED
SOURCE: NORMAL

## 2022-05-03 ASSESSMENT — ENCOUNTER SYMPTOMS
COUGH: 0
CHEST TIGHTNESS: 0
COLOR CHANGE: 0
WHEEZING: 0
EYE PAIN: 0
CONSTIPATION: 0
VOMITING: 0
PHOTOPHOBIA: 0
NAUSEA: 0
SHORTNESS OF BREATH: 1
DIARRHEA: 0
BLOOD IN STOOL: 0
ABDOMINAL PAIN: 0
BACK PAIN: 0

## 2022-05-03 NOTE — TELEPHONE ENCOUNTER
Patient completed pre procedure labs and INR resulted at 2.53. Per Epic patient taking 6mg coumadin daily except Tuesday taking 8 mg coumadin. Dr Lenin Gallo aware of results and advising patient to hold coumadin 2 days prior to scheduled Dual Chamber ICD Implant scheduled 5/5/2022. Tried to contact patient to advise. No answer and unable to leave a message due to no voicemail. Will attempt to contact patient at a later time.

## 2022-05-03 NOTE — TELEPHONE ENCOUNTER
Spoke with patient and patient is aware to hold coumadin starting today for upcoming scheduled procedure of Dual Chamber ICD scheduled 5/5/2022 and voiced understanding.

## 2022-05-05 ENCOUNTER — APPOINTMENT (OUTPATIENT)
Dept: GENERAL RADIOLOGY | Age: 37
End: 2022-05-05
Attending: INTERNAL MEDICINE
Payer: COMMERCIAL

## 2022-05-05 ENCOUNTER — HOSPITAL ENCOUNTER (OUTPATIENT)
Dept: CARDIAC CATH/INVASIVE PROCEDURES | Age: 37
Setting detail: OBSERVATION
Discharge: HOME | End: 2022-05-06
Attending: INTERNAL MEDICINE | Admitting: INTERNAL MEDICINE
Payer: COMMERCIAL

## 2022-05-05 DIAGNOSIS — Z95.810 S/P ICD (INTERNAL CARDIAC DEFIBRILLATOR) PROCEDURE: Primary | ICD-10-CM

## 2022-05-05 LAB
ABO/RH: NORMAL
ANTIBODY SCREEN: NEGATIVE
APTT: 31.3 SECONDS (ref 25.1–37.1)
GLUCOSE BLD-MCNC: 102 MG/DL (ref 70–99)
INR BLD: 1.26 INDEX
PROTHROMBIN TIME: 16.3 SECONDS (ref 11.7–14.5)

## 2022-05-05 PROCEDURE — 85610 PROTHROMBIN TIME: CPT

## 2022-05-05 PROCEDURE — 2500000003 HC RX 250 WO HCPCS

## 2022-05-05 PROCEDURE — 6360000002 HC RX W HCPCS

## 2022-05-05 PROCEDURE — 2709999900 HC NON-CHARGEABLE SUPPLY

## 2022-05-05 PROCEDURE — C1777 LEAD, AICD, ENDO SINGLE COIL: HCPCS

## 2022-05-05 PROCEDURE — 33249 INSJ/RPLCMT DEFIB W/LEAD(S): CPT

## 2022-05-05 PROCEDURE — 86850 RBC ANTIBODY SCREEN: CPT

## 2022-05-05 PROCEDURE — 82962 GLUCOSE BLOOD TEST: CPT

## 2022-05-05 PROCEDURE — C1898 LEAD, PMKR, OTHER THAN TRANS: HCPCS

## 2022-05-05 PROCEDURE — 85730 THROMBOPLASTIN TIME PARTIAL: CPT

## 2022-05-05 PROCEDURE — C1892 INTRO/SHEATH,FIXED,PEEL-AWAY: HCPCS

## 2022-05-05 PROCEDURE — 2580000003 HC RX 258

## 2022-05-05 PROCEDURE — 86900 BLOOD TYPING SEROLOGIC ABO: CPT

## 2022-05-05 PROCEDURE — 33249 INSJ/RPLCMT DEFIB W/LEAD(S): CPT | Performed by: INTERNAL MEDICINE

## 2022-05-05 PROCEDURE — 94761 N-INVAS EAR/PLS OXIMETRY MLT: CPT

## 2022-05-05 PROCEDURE — 96366 THER/PROPH/DIAG IV INF ADDON: CPT

## 2022-05-05 PROCEDURE — 6370000000 HC RX 637 (ALT 250 FOR IP): Performed by: INTERNAL MEDICINE

## 2022-05-05 PROCEDURE — 6360000002 HC RX W HCPCS: Performed by: NURSE PRACTITIONER

## 2022-05-05 PROCEDURE — 2580000003 HC RX 258: Performed by: INTERNAL MEDICINE

## 2022-05-05 PROCEDURE — 6360000002 HC RX W HCPCS: Performed by: INTERNAL MEDICINE

## 2022-05-05 PROCEDURE — 2580000003 HC RX 258: Performed by: NURSE PRACTITIONER

## 2022-05-05 PROCEDURE — 96365 THER/PROPH/DIAG IV INF INIT: CPT

## 2022-05-05 PROCEDURE — 71045 X-RAY EXAM CHEST 1 VIEW: CPT

## 2022-05-05 PROCEDURE — 93308 TTE F-UP OR LMTD: CPT

## 2022-05-05 PROCEDURE — 86901 BLOOD TYPING SEROLOGIC RH(D): CPT

## 2022-05-05 PROCEDURE — 6360000004 HC RX CONTRAST MEDICATION

## 2022-05-05 PROCEDURE — C1721 AICD, DUAL CHAMBER: HCPCS

## 2022-05-05 RX ORDER — TRAMADOL HYDROCHLORIDE 50 MG/1
50 TABLET ORAL ONCE
Status: COMPLETED | OUTPATIENT
Start: 2022-05-05 | End: 2022-05-05

## 2022-05-05 RX ORDER — WARFARIN SODIUM 6 MG/1
6 TABLET ORAL DAILY
Status: DISCONTINUED | OUTPATIENT
Start: 2022-05-05 | End: 2022-05-06 | Stop reason: HOSPADM

## 2022-05-05 RX ORDER — SODIUM CHLORIDE 0.9 % (FLUSH) 0.9 %
5-40 SYRINGE (ML) INJECTION PRN
Status: CANCELLED | OUTPATIENT
Start: 2022-05-05

## 2022-05-05 RX ORDER — CLOPIDOGREL BISULFATE 75 MG/1
75 TABLET ORAL DAILY
Status: DISCONTINUED | OUTPATIENT
Start: 2022-05-06 | End: 2022-05-06 | Stop reason: HOSPADM

## 2022-05-05 RX ORDER — SODIUM CHLORIDE 0.9 % (FLUSH) 0.9 %
5-40 SYRINGE (ML) INJECTION EVERY 12 HOURS SCHEDULED
Status: DISCONTINUED | OUTPATIENT
Start: 2022-05-05 | End: 2022-05-06 | Stop reason: HOSPADM

## 2022-05-05 RX ORDER — LISINOPRIL 40 MG/1
40 TABLET ORAL DAILY
Status: CANCELLED | OUTPATIENT
Start: 2022-05-05

## 2022-05-05 RX ORDER — METOPROLOL SUCCINATE 100 MG/1
100 TABLET, EXTENDED RELEASE ORAL DAILY
Status: CANCELLED | OUTPATIENT
Start: 2022-05-05

## 2022-05-05 RX ORDER — METOPROLOL SUCCINATE 50 MG/1
50 TABLET, EXTENDED RELEASE ORAL DAILY
Status: DISCONTINUED | OUTPATIENT
Start: 2022-05-05 | End: 2022-05-05

## 2022-05-05 RX ORDER — METOPROLOL SUCCINATE 50 MG/1
50 TABLET, EXTENDED RELEASE ORAL DAILY
Status: CANCELLED | OUTPATIENT
Start: 2022-05-05

## 2022-05-05 RX ORDER — METOPROLOL SUCCINATE 50 MG/1
150 TABLET, EXTENDED RELEASE ORAL DAILY
Status: DISCONTINUED | OUTPATIENT
Start: 2022-05-05 | End: 2022-05-06 | Stop reason: HOSPADM

## 2022-05-05 RX ORDER — CLOPIDOGREL BISULFATE 75 MG/1
75 TABLET ORAL DAILY
Status: CANCELLED | OUTPATIENT
Start: 2022-05-05

## 2022-05-05 RX ORDER — SODIUM CHLORIDE 9 MG/ML
INJECTION, SOLUTION INTRAVENOUS PRN
Status: DISCONTINUED | OUTPATIENT
Start: 2022-05-05 | End: 2022-05-06 | Stop reason: HOSPADM

## 2022-05-05 RX ORDER — 0.9 % SODIUM CHLORIDE 0.9 %
250 INTRAVENOUS SOLUTION INTRAVENOUS ONCE
Status: COMPLETED | OUTPATIENT
Start: 2022-05-05 | End: 2022-05-06

## 2022-05-05 RX ORDER — SODIUM CHLORIDE 0.9 % (FLUSH) 0.9 %
5-40 SYRINGE (ML) INJECTION PRN
Status: DISCONTINUED | OUTPATIENT
Start: 2022-05-05 | End: 2022-05-06 | Stop reason: HOSPADM

## 2022-05-05 RX ORDER — SPIRONOLACTONE 25 MG/1
25 TABLET ORAL DAILY
Status: CANCELLED | OUTPATIENT
Start: 2022-05-05

## 2022-05-05 RX ORDER — SODIUM CHLORIDE 9 MG/ML
INJECTION, SOLUTION INTRAVENOUS PRN
Status: CANCELLED | OUTPATIENT
Start: 2022-05-05

## 2022-05-05 RX ORDER — ACETAMINOPHEN 325 MG/1
650 TABLET ORAL EVERY 6 HOURS PRN
Status: DISCONTINUED | OUTPATIENT
Start: 2022-05-05 | End: 2022-05-06 | Stop reason: HOSPADM

## 2022-05-05 RX ORDER — ATORVASTATIN CALCIUM 40 MG/1
80 TABLET, FILM COATED ORAL NIGHTLY
Status: DISCONTINUED | OUTPATIENT
Start: 2022-05-05 | End: 2022-05-06 | Stop reason: HOSPADM

## 2022-05-05 RX ORDER — DOXYCYCLINE HYCLATE 100 MG
100 TABLET ORAL 2 TIMES DAILY
Qty: 10 TABLET | Refills: 0 | Status: SHIPPED | OUTPATIENT
Start: 2022-05-05 | End: 2022-05-10

## 2022-05-05 RX ORDER — SPIRONOLACTONE 50 MG/1
25 TABLET, FILM COATED ORAL DAILY
Status: DISCONTINUED | OUTPATIENT
Start: 2022-05-05 | End: 2022-05-06 | Stop reason: HOSPADM

## 2022-05-05 RX ORDER — ATORVASTATIN CALCIUM 80 MG/1
80 TABLET, FILM COATED ORAL NIGHTLY
Status: CANCELLED | OUTPATIENT
Start: 2022-05-05

## 2022-05-05 RX ORDER — SODIUM CHLORIDE 0.9 % (FLUSH) 0.9 %
5-40 SYRINGE (ML) INJECTION EVERY 12 HOURS SCHEDULED
Status: CANCELLED | OUTPATIENT
Start: 2022-05-05

## 2022-05-05 RX ORDER — TRAMADOL HYDROCHLORIDE 50 MG/1
50 TABLET ORAL EVERY 6 HOURS PRN
Status: CANCELLED | OUTPATIENT
Start: 2022-05-05

## 2022-05-05 RX ORDER — ASPIRIN 81 MG/1
81 TABLET, CHEWABLE ORAL DAILY
Status: DISCONTINUED | OUTPATIENT
Start: 2022-05-06 | End: 2022-05-06 | Stop reason: HOSPADM

## 2022-05-05 RX ORDER — ASPIRIN 81 MG/1
81 TABLET, CHEWABLE ORAL DAILY
Status: CANCELLED | OUTPATIENT
Start: 2022-05-05

## 2022-05-05 RX ORDER — LISINOPRIL 20 MG/1
40 TABLET ORAL DAILY
Status: DISCONTINUED | OUTPATIENT
Start: 2022-05-05 | End: 2022-05-06 | Stop reason: HOSPADM

## 2022-05-05 RX ORDER — TRAMADOL HYDROCHLORIDE 50 MG/1
50 TABLET ORAL EVERY 8 HOURS PRN
Qty: 9 TABLET | Refills: 0 | Status: SHIPPED | OUTPATIENT
Start: 2022-05-05 | End: 2022-05-08

## 2022-05-05 RX ORDER — CEFAZOLIN SODIUM 2 G/100ML
2000 INJECTION, SOLUTION INTRAVENOUS EVERY 8 HOURS
Status: COMPLETED | OUTPATIENT
Start: 2022-05-05 | End: 2022-05-06

## 2022-05-05 RX ADMIN — SODIUM CHLORIDE 250 ML: 9 INJECTION, SOLUTION INTRAVENOUS at 16:03

## 2022-05-05 RX ADMIN — WARFARIN SODIUM 6 MG: 6 TABLET ORAL at 20:01

## 2022-05-05 RX ADMIN — TRAMADOL HYDROCHLORIDE 50 MG: 50 TABLET, COATED ORAL at 12:10

## 2022-05-05 RX ADMIN — CEFAZOLIN SODIUM 2000 MG: 2 INJECTION, SOLUTION INTRAVENOUS at 15:29

## 2022-05-05 RX ADMIN — CEFAZOLIN SODIUM 1000 MG: 1 INJECTION, POWDER, FOR SOLUTION INTRAMUSCULAR; INTRAVENOUS at 23:38

## 2022-05-05 RX ADMIN — SODIUM CHLORIDE, PRESERVATIVE FREE 10 ML: 5 INJECTION INTRAVENOUS at 20:01

## 2022-05-05 RX ADMIN — SODIUM CHLORIDE 25 ML: 9 INJECTION, SOLUTION INTRAVENOUS at 23:36

## 2022-05-05 ASSESSMENT — PAIN SCALES - GENERAL
PAINLEVEL_OUTOF10: 0
PAINLEVEL_OUTOF10: 2
PAINLEVEL_OUTOF10: 5
PAINLEVEL_OUTOF10: 3

## 2022-05-05 NOTE — PROGRESS NOTES
Pressure dressing reapplied to L chest as ordered by Dr Eve Dover, small amount of bruising noted at site.  Report called to Ai MARTINEZ on 3N

## 2022-05-05 NOTE — FLOWSHEET NOTE
Assessment done. Pt on cell phone- attempted to do admission and explain to him how to order dinner. Pt asked this RN to leave room and give him time on his phone. Attempted to explain again to pt the time for ordering food and he stated to let him talk. Will cont. To try to explain and educate pt.

## 2022-05-05 NOTE — PROGRESS NOTES
After eating a sandwich, patient complained of feeling faint once again. BP dropped into the 70s once again. Patient assisted to lay down. Dr. Abel Millan came to bedside to assess the patient. BP stabilized promptly. Per Dr. Abel Millan, patient will stay overnight.

## 2022-05-05 NOTE — PROGRESS NOTES
1112- Patient took his home dose of plavix per Dr. Singh Ernesto request.     Left chest remains free from swelling or bleeding.

## 2022-05-05 NOTE — PROGRESS NOTES
Pt sts feeling better and that is symptoms have resolved. O2 sat % on room air. NS bolus continues to infuse.

## 2022-05-05 NOTE — FLOWSHEET NOTE
Pt arrived on unit, oriented to room and call light. Dressing is clean dry and intact. Pt placed on monitor and alarms verified. Pt given warm blankets.

## 2022-05-05 NOTE — PROGRESS NOTES
Dr. Esteban Olivares reviewed the echo at bedside as well as the patient. Patient no longer symptomatic or having hypotension. Dr. Esteban Olivares educated patient about vagal response from tramadol and encourages him to only take tylenol for pain at home. Per Dr. Esteban Olivares patient ok to discharge home this evening after ambulating. Patient ambulated with no dizziness. Patient sitting on edge of bed eating dinner.

## 2022-05-05 NOTE — H&P
Electrophysiology H&p Note      Reason for consultation: Need for ICD    Chief complaint : Shortness of breath on moderate exertion - discuss ICD    Referring physician: Rosemary Muñiz      Primary care physician: Nolberto Winter MD      History of Present Illness: Today visit (05/05/22)    Patient here for ICD implantation. No change in H&P noted from previous clinic visit. Previous visit (4/27/2022)      Chief Complaint   Patient presents with    Cardiomyopathy     Patient is here to discuss possible device. Patient denies chest pain, shortness of breath at rest, dizziness, palpitations, and edema. Other wise not much different from last visit. Patient reports that his insurance is no longer paying for Life vest. Patient wants to consider ICD. He discussed with Drew Hernandez and he feels its time for him now           Previous visit:    Patient is a 25-year-old male with history of ischemic cardiomyopathy, CVA referred by Dr. Carter Montiel for ICD. Patient was initially hospitalized in August 2021 when he had aphasic kind of symptoms and he was noted to have CVA on echocardiogram he was noted to have akinetic apex with LVEF of 20%. Patient had a left heart cath and it showed an occluded LAD. Viability study showed infarcted apex and a viable anterior wall. Later on patient was referred for a  intervention by Dr. Ally Noel in December 2021. Patient has been taking medical therapy but still his LVEF has not improved significantly. Patient has LifeVest on. Patient reports off-and-on he gets palpitations. Patient is not dizzy or lightheaded. Patient reports that he has shortness of breath with moderate exertion but he is able to do regular activities without any discomfort. He denies any swelling in the legs, chest pain, dizziness or syncope.     Patient reports he did have some chest pain in the month of August and he thought it was probably gastritis and he was taking medication for that but he did not think that it was cardiac pain and until he was told that his heart function was weak in August when he came with a stroke    He recovered fully from his CVA. Patient reports he is compliant with his medication and wants to see if his LV function will improve further. He is not decided if he wants to have an ICD yet and he wants to know if he can continue the LifeVest for few more months to see if LVEF will improve    [facility]   Past Medical History:   Diagnosis Date    Anticoagulated on Coumadin     Aphasia 08/2021    Cerebrovascular accident (CVA) due to embolism ([facility] [address] 08/2021    Ischemic cardiomyopathy 08/2021    NSTEMI (non-ST elevated myocardial infarction) ([facility] [address] 08/2021    Palpitations     Presence of external cardiac defibrillator 08/27/2021    Currently wearing LifeVest.       Surgical history : Denies any recent surgeries. Family history: No history of sudden cardiac death    Social history :  reports that he quit smoking about 7 months ago. He smoked 0.50 packs per day prior to that. He has never used smokeless tobacco. He reports that he does not drink alcohol and does not use drugs. No Known Allergies    No current facility-administered medications on file prior to encounter. Current Outpatient Medications on File Prior to Encounter   Medication Sig Dispense Refill    warfarin (COUMADIN) 2 MG tablet Take 3 tablets by mouth daily Except take 4 tablets on Tuesdays or as directed 100 tablet 2    metoprolol succinate (TOPROL XL) 100 MG extended release tablet Take 1 tablet by mouth daily Do not crush or chew.  HOLD FOR SBP LESS THAN 90 AND HR LESS THAN 60  Take 150 mg 90 tablet 3    lisinopril (PRINIVIL;ZESTRIL) 40 MG tablet Take 1 tablet by mouth daily HOLD FOR SBP LESS THAN 90 AND HR LESS THAN 60 90 tablet 3    metoprolol succinate (TOPROL XL) 50 MG extended release tablet Take 1 tablet by mouth daily Take 150 mg daily 30 tablet 3    clopidogrel (PLAVIX) 75 MG tablet Take 75 mg by mouth daily      folic acid-pyridoxine-cyancobalamin (FOLTX) 1.13-25-2 MG TABS Take 1 tablet by mouth daily (Patient not taking: Reported on 4/8/2022) 90 tablet 3    spironolactone (ALDACTONE) 25 MG tablet Take 1 tablet by mouth daily 90 tablet 3    aspirin 81 MG chewable tablet Take 1 tablet by mouth daily 90 tablet 3    atorvastatin (LIPITOR) 80 MG tablet Take 1 tablet by mouth nightly 90 tablet 3       Review of Systems:   Review of Systems   Constitutional: Negative for activity change, chills, fatigue and fever. HENT: Negative for congestion, ear pain and tinnitus. Eyes: Negative for photophobia, pain and visual disturbance. Respiratory: Positive for shortness of breath (with moderate exertion). Negative for cough, chest tightness and wheezing. Cardiovascular: Negative for chest pain, palpitations and leg swelling. Gastrointestinal: Negative for abdominal pain, blood in stool, constipation, diarrhea, nausea and vomiting. Endocrine: Negative for cold intolerance and heat intolerance. Genitourinary: Negative for dysuria, flank pain and hematuria. Musculoskeletal: Negative for arthralgias, back pain, myalgias and neck stiffness. Skin: Negative for color change and rash. Allergic/Immunologic: Negative for food allergies. Neurological: Negative for dizziness, light-headedness, numbness and headaches. Hematological: Does not bruise/bleed easily. Psychiatric/Behavioral: Negative for agitation, behavioral problems and confusion. Examination:      Vitals:    05/05/22 0636 05/05/22 0645 05/05/22 0706   BP:  114/78 114/78   Pulse:  77 66   Resp:  12 17   Temp:  97.1 °F (36.2 °C) 96.8 °F (36 °C)   TempSrc:  Temporal Temporal   SpO2:  98% 99%   Weight: 182 lb (82.6 kg)     Height: 5' 10\" (1.778 m)          Body mass index is 26.11 kg/m².       Physical Exam  Constitutional: Appearance: Normal appearance. He is not ill-appearing. HENT:      Head: Normocephalic and atraumatic. Mouth/Throat:      Mouth: Mucous membranes are moist.   Eyes:      Conjunctiva/sclera: Conjunctivae normal.   Cardiovascular:      Rate and Rhythm: Normal rate and regular rhythm. Heart sounds: Murmur (grade 2/6 systolic murmur) heard. Pulmonary:      Effort: Pulmonary effort is normal.      Breath sounds: No rales. Abdominal:      General: Abdomen is flat. Palpations: Abdomen is soft. Musculoskeletal:         General: No tenderness. Normal range of motion. Cervical back: Normal range of motion. Right lower leg: No edema. Left lower leg: No edema. Skin:     General: Skin is warm and dry. Neurological:      General: No focal deficit present. Mental Status: He is alert and oriented to person, place, and time. CBC:   Lab Results   Component Value Date    WBC 8.8 05/02/2022    HGB 15.4 05/02/2022    HCT 49.3 05/02/2022     05/02/2022     Lipids:  Lab Results   Component Value Date    CHOL 149 08/22/2021    TRIG 148 08/22/2021    HDL 28 (L) 08/22/2021    LDLDIRECT 94 08/22/2021     PT/INR:   Lab Results   Component Value Date    INR 1.26 05/05/2022        BMP:    Lab Results   Component Value Date     05/02/2022    K 4.8 05/02/2022     05/02/2022    CO2 26 05/02/2022    BUN 8 05/02/2022     CMP:   Lab Results   Component Value Date    AST 30 08/22/2021    PROT 7.0 08/22/2021    BILITOT 0.4 08/22/2021    ALKPHOS 95 08/22/2021     TSH:  No results found for: TSH, T4    EKGINTERPRETATION - EKG Interpretation:   Sinus rhythm, old anterolateral MI    Echo 3/30/2022    Dilated left ventricle with extensive regional wall motion abnormalities and   severe systolic impairment. EF is estimated at 20-25%. Grade II diastolic dysfunction. Septal and anterior wall segments are akinetic. Apical ballooning noted. Moderately dilated left atrium.    Mild mitral and tricuspid regurgitation is present. RVSP is 31 mmHg. No evidence of pericardial effusion. Compared to previous echo on 8/2021,LV size and EF has minimally improved,   <20% to 20-25%. ASA - 3  MALLAMPATI - 2    IMPRESSION / RECOMMENDATIONS:     Ischemic cardiomyopathy  CVA on coumadin - CVA thought to be from LV thrombus possibility given akinetic apex    Patient is 81-year-old male with ischemic cardiomyopathy on metoprolol, lisinopril and spironolactone and referred for ICD. Patient is on optimal medical therapy and he has been on therapy for more than 3 months and still his LVEF is is 20-25%     I had a very detailed discussion on the patient about ischemic cardiomyopathy and risk of sudden death and need for ICD. The risks including, but not limited to, the risks of vascular injury, bleeding, infection, device malfunction, lead dislodgement, radiation exposure, injury to cardiac and surrounding structures (including pneumothorax), stroke, myocardial infarction and death were discussed in detail. The patient opted to proceed with the device implantation. Thanks again for allowing me to participate in care of this patient. Please call me if you have any questions. With best regards. Guille Miller MD, 5/5/2022 7:50 AM     Please note this report has been partially produced using speech recognition software and may contain errors related to that system including errors in grammar, punctuation, and spelling, as well as words and phrases that may be inappropriate. If there are any questions or concerns please feel free to contact the dictating provider for clarification.

## 2022-05-05 NOTE — OP NOTE
[facility]     Electrophysiology Procedure Note       Date of Procedure: 5/5/2022  Patient's Name: Hema Davenport  YOB: 1985   Medical Record Number: [id number]  Procedure Performed by: Stacie Forrest MD     Procedures performed:  · Insertion of right ventricular defibrillator lead under fluoroscopy. · Insertion of right atrial lead under fluoroscopy  · Insertion of a dual chamber ICD. · Electronic analysis of lead and device. · IV sedation. · Selective venography of Left upper extremity. Indication of the procedure:       Hema Davenport is a 29 y.o. male with ischemic cardiomyopathy with LVEF 20-25% , NYHA class II, on optimal medical therapy here ICD for primary prevention - given history of stroke I will place dual chamber ICD to assess if there are any atrial arrhythmia. Sedation : versed and fentanyl    Blood loss : 20 cc    Details of procedure: The patient was brought to the electrophysiology laboratory in stable condition. The patient was in a fasting and non-sedated state. The risks, benefits and alternatives of the procedure were discussed with the patient. The risks including, but not limited to, the risks of vascular injury, bleeding, infection, device malfunction, lead dislodgement, radiation exposure, injury to cardiac and surrounding structures (including pneumothorax), stroke, myocardial infarction and death were discussed in detail. The patient opted to proceed with the device implantation. Written informed consent was signed and placed in the chart. Prophylactic antibiotic was given. Selective venography of the left upper extremity was done to assess vein patency. The patient was prepped and draped in a sterile fashion. A timeout protocol was completed to identify the patient and the procedure being performed. IV sedation was provided with IV Versed, Fentanyl.  An incision was made in the left pectoral area after administration of lidocaine. Using electrocautery and blunt dissection, a pocket was created. Central venous access into the left subclavian vein was obtained using the modified Seldinger technique. After central venous access was obtained, a sheath was placed in the subclavian vein. A right ventricular lead was advanced into position in the apex under fluoroscopic guidance and using a series of curved stylets. The lead was actively fixated. After confirming appropriate function, the sheath was split and removed. The lead was secured to the underlying tissue using suture material. A new sheath was advanced over a second previously placed wire into the vein. The atrial lead was advanced to the right atrial appendage and actively fixated under fluoroscopic guidance. After confirming appropriate function, the sheath was split and removed. The lead was secured to the underlying tissue using suture. The pocket was irrigated with an antibiotic solution. The leads were then connected to the new pulse generator dual chamber ICD which was then placed into the cleaned pocket. A dual chamber ICD was implanted to differentiate between atrial and ventricular arrhythmias to minimize inappropriate shocks and provide potential physiological pacing. The pocket was then closed in two subcutaneous layers using 2-0 & 2-0 Vicryl and subcuticular layer using 4-0 Vicryl. The skin was covered with Steri-Strips and pressure dressing. All sponge and needle counts were reported as correct at the end of the procedure. The patient tolerated the procedure well and there were no complications. Patient was transported to the holding area in stable condition. Lead and device information:                   Plan:     The patient will have usual post-implant care, including chest x-ray, and antibiotic therapy and interrogation of the device.

## 2022-05-05 NOTE — PROGRESS NOTES
Pt c/o feeling nauseated, dizzy and warm. Skin diaphoretic  . BP in 70s. Pt lying flat. Cool compress to head. Dr Lenin Gallo aware, An Almonte NP at bedside.   Received orders for  bolus and stat ECHO

## 2022-05-06 VITALS
TEMPERATURE: 97.8 F | RESPIRATION RATE: 17 BRPM | WEIGHT: 182 LBS | BODY MASS INDEX: 26.05 KG/M2 | DIASTOLIC BLOOD PRESSURE: 69 MMHG | HEIGHT: 70 IN | HEART RATE: 95 BPM | SYSTOLIC BLOOD PRESSURE: 103 MMHG | OXYGEN SATURATION: 97 %

## 2022-05-06 LAB
INR BLD: 1.09 INDEX
PROTHROMBIN TIME: 14.1 SECONDS (ref 11.7–14.5)

## 2022-05-06 PROCEDURE — 85610 PROTHROMBIN TIME: CPT

## 2022-05-06 PROCEDURE — 2580000003 HC RX 258: Performed by: INTERNAL MEDICINE

## 2022-05-06 PROCEDURE — 6370000000 HC RX 637 (ALT 250 FOR IP): Performed by: INTERNAL MEDICINE

## 2022-05-06 PROCEDURE — 36415 COLL VENOUS BLD VENIPUNCTURE: CPT

## 2022-05-06 PROCEDURE — 93283 PRGRMG EVAL IMPLANTABLE DFB: CPT | Performed by: INTERNAL MEDICINE

## 2022-05-06 PROCEDURE — G0378 HOSPITAL OBSERVATION PER HR: HCPCS

## 2022-05-06 PROCEDURE — 94761 N-INVAS EAR/PLS OXIMETRY MLT: CPT

## 2022-05-06 RX ADMIN — ASPIRIN 81 MG 81 MG: 81 TABLET ORAL at 09:07

## 2022-05-06 RX ADMIN — CLOPIDOGREL BISULFATE 75 MG: 75 TABLET ORAL at 09:08

## 2022-05-06 RX ADMIN — SODIUM CHLORIDE, PRESERVATIVE FREE 10 ML: 5 INJECTION INTRAVENOUS at 09:08

## 2022-05-06 RX ADMIN — METOPROLOL SUCCINATE 150 MG: 50 TABLET, EXTENDED RELEASE ORAL at 09:07

## 2022-05-06 RX ADMIN — SPIRONOLACTONE 25 MG: 50 TABLET ORAL at 09:06

## 2022-05-06 RX ADMIN — LISINOPRIL 40 MG: 20 TABLET ORAL at 09:06

## 2022-05-06 ASSESSMENT — PAIN SCALES - GENERAL: PAINLEVEL_OUTOF10: 0

## 2022-05-06 NOTE — PLAN OF CARE
Problem: Chronic Conditions and Co-morbidities  Goal: Patient's chronic conditions and co-morbidity symptoms are monitored and maintained or improved  Outcome: Progressing     Problem: Discharge Planning  Goal: Discharge to home or other facility with appropriate resources  Outcome: Progressing     Problem: Pain  Goal: Verbalizes/displays adequate comfort level or baseline comfort level  Outcome: Progressing     Problem: ABCDS Injury Assessment  Goal: Absence of physical injury  Outcome: Progressing

## 2022-05-06 NOTE — CARE COORDINATION
.CM has reviewed pt's chart for needs. CM screening shows that pt has PCP, insurance and is independent PTA. If needs arise please contact [facility].   TE

## 2022-05-06 NOTE — DISCHARGE SUMMARY
[facility]  Discharge Summary    Joe Goodwin  :  1985  MRN:  [id number]    Admit date:  2022  Discharge date:      Admitting Physician:  Miranda Lim MD    Discharge Diagnoses:     1. Sp dual chamber ICD     Admission Condition:  fair    Discharged Condition:  good    Hospital Course:   Patient with hx of ischemic cardiomyopathy with LVEF 20-25% presented for implantation of dual chamber ICD. Patient tolerated the procedure well. Plan was to discharge patient home lastnight. Patient had some hypotension post procedure most likely due to sedation and pain medication. Observed overnight. Patient did well - no complications were noted. Echo did not show any pericardial effusion - patient noted to have previous layered clot in LV and he is on coumadin. Continued with coumadin. Patient device area was clean, no hematoma and no tenderness. Patient device interrogation was stable. CXR confirmed placement of device with no complications. Patient stable for discharge with out patient follow up. Blood pressure this morning is at baseline. Patient ambulated without complaints. Blood pressure reassessed and is stable. Current Discharge Medication List           Details   traMADol (ULTRAM) 50 MG tablet Take 1 tablet by mouth every 8 hours as needed for Pain for up to 3 days. Intended supply: 3 days. Take lowest dose possible to manage pain  Qty: 9 tablet, Refills: 0    Comments: Reduce doses taken as pain becomes manageable  Associated Diagnoses: S/P ICD (internal cardiac defibrillator) procedure      doxycycline hyclate (VIBRA-TABS) 100 MG tablet Take 1 tablet by mouth 2 times daily for 5 days  Qty: 10 tablet, Refills: 0              Details   warfarin (COUMADIN) 2 MG tablet Take 3 tablets by mouth daily Except take 4 tablets on  or as directed  Qty: 100 tablet, Refills: 2      !! metoprolol succinate (TOPROL XL) 100 MG extended release tablet Take 1 tablet by mouth daily Do not crush or chew. HOLD FOR SBP LESS THAN 90 AND HR LESS THAN 60  Take 150 mg  Qty: 90 tablet, Refills: 3      lisinopril (PRINIVIL;ZESTRIL) 40 MG tablet Take 1 tablet by mouth daily HOLD FOR SBP LESS THAN 90 AND HR LESS THAN 60  Qty: 90 tablet, Refills: 3      !! metoprolol succinate (TOPROL XL) 50 MG extended release tablet Take 1 tablet by mouth daily Take 150 mg daily  Qty: 30 tablet, Refills: 3      clopidogrel (PLAVIX) 75 MG tablet Take 75 mg by mouth daily      folic acid-pyridoxine-cyancobalamin (FOLTX) 1.13-25-2 MG TABS Take 1 tablet by mouth daily  Qty: 90 tablet, Refills: 3      spironolactone (ALDACTONE) 25 MG tablet Take 1 tablet by mouth daily  Qty: 90 tablet, Refills: 3      aspirin 81 MG chewable tablet Take 1 tablet by mouth daily  Qty: 90 tablet, Refills: 3      atorvastatin (LIPITOR) 80 MG tablet Take 1 tablet by mouth nightly  Qty: 90 tablet, Refills: 3       !! - Potential duplicate medications found. Please discuss with provider. Consults:  none    Discharge Exam:  /69   Pulse 95   Temp 97.8 °F (36.6 °C) (Oral)   Resp 18   Ht 5' 10\" (1.778 m)   Wt 182 lb (82.6 kg)   SpO2 99%   BMI 26.11 kg/m²   General appearance: alert, appears stated age and cooperative  Head: Normocephalic, without obvious abnormality, atraumatic  Lungs: clear to auscultation bilaterally  Heart: regular rate and rhythm, S1, S2 normal, no murmur, click, rub or gallop  Extremities: extremities normal, atraumatic, no cyanosis or edema  Pulses: 2+ and symmetric  Skin: Skin color, texture, turgor normal. No rashes or lesions. Left upper chest dressing clean dry intact. Minimal swelling. No bruising or hematoma  Neurologic: Grossly normal    Disposition:   home    Signed:  RICARDO Finley CNP  5/6/2022, 8:34 AM     Device Assessment:         The device is Medtronic ICD - Dual Chamber chamber      MRI Compatible : yes    Device interrogation was performed.     Mode :  AAI --- DDD     Sensing is normal. Impedence is normal. Threshold is normal.     There has not been interval changes. Estimated battery life is new    The underlying rhythm is AS, VS.      Atrial Arrhythmia : No    Non sustained VT episodes : No    Sustained VT episodes : No    The patient is not pacemaker dependent.        HF management parameter are still collecting data     Interpreted by    Sally Marks M.D

## 2022-05-09 ENCOUNTER — TELEPHONE (OUTPATIENT)
Dept: CARDIOLOGY CLINIC | Age: 37
End: 2022-05-09

## 2022-05-09 NOTE — LETTER
[facility]  [address] [address]  Phone: [phone]  Fax: [phone]    VICTORINO Hsieh      May 9, 2022     Patient: Ludy Lopez   YOB: 1985   Date of Visit: 5/9/2022       To Whom It May Concern: It is my medical opinion that from Ludy Lopez may return to work on 5/10/2022 with no restrictions. If you have any questions or concerns, please don't hesitate to call.     Sincerely,      VICTORINO Hsieh

## 2022-05-09 NOTE — TELEPHONE ENCOUNTER
Faxed medical records request to [facility]- [phone].

## 2022-05-09 NOTE — LETTER
[facility]  [address] [address]  Phone: [phone]  Fax: [phone]           VICTORINO Hsieh      May 9, 2022     Patient: Ludy Lopez   YOB: 1985   Date of Visit: 5/9/2022       To Whom It May Concern: It is my medical opinion that from Ludy Lopez may return to work on 5/10/2022. If you have any questions or concerns, please don't hesitate to call.     Sincerely,      FERNANDO HsiehCNP

## 2022-05-09 NOTE — TELEPHONE ENCOUNTER
Discussed with Augie Nascimento CNP, and patient cleared to return to work as patient works from home. Letter printed and faxed to [phone] as requested by patient. Did also advise patient can take OTC Mucinex or coricidin HBP. Did also advise to contact primary care for any worsening or continuous chest congestion/cough. Patient voiced understanding.

## 2022-05-09 NOTE — Clinical Note
[facility]  [address] [address]  Phone: [phone]  Fax: [phone]    Saritha Kline MD        May 9, 2022     Patient: Matheus Padilla   YOB: 1985   Date of Visit: 5/9/2022       To Whom It May Concern: It is my medical opinion that Matheus Padilla {participation release, (activity restriction):50491}. If you have any questions or concerns, please don't hesitate to call.     Sincerely,        Saritha Kline MD

## 2022-05-13 ENCOUNTER — NURSE ONLY (OUTPATIENT)
Dept: CARDIOLOGY CLINIC | Age: 37
End: 2022-05-13

## 2022-05-13 VITALS — TEMPERATURE: 98.2 F

## 2022-05-13 DIAGNOSIS — Z95.810 STATUS POST IMPLANTATION OF AUTOMATIC CARDIOVERTER/DEFIBRILLATOR (AICD): Primary | ICD-10-CM

## 2022-05-13 PROCEDURE — 99024 POSTOP FOLLOW-UP VISIT: CPT | Performed by: INTERNAL MEDICINE

## 2022-05-13 NOTE — PROGRESS NOTES
Patient seen for site check post ICD implant. Dressing removed. No signs of inflammation or infection noted. Edges well approximated. Patient has no complaints of pain or discomfort. Single steri strip applied. Patient instructed to not lift arm higher than shoulder level.

## 2022-05-19 ENCOUNTER — TELEPHONE (OUTPATIENT)
Dept: CARDIOLOGY CLINIC | Age: 37
End: 2022-05-19

## 2022-05-23 ENCOUNTER — TELEPHONE (OUTPATIENT)
Dept: CARDIOLOGY CLINIC | Age: 37
End: 2022-05-23

## 2022-05-23 NOTE — TELEPHONE ENCOUNTER
Returned phone call to patient and advised patient okay to shower. Did also schedule 1 month follow up s/p ICD 5/5/2022. Also reminded patient still restricted to not lifting arm higher than shoulder level and not lift heavier than 5-8 pounds. Patient voiced understanding. Did ask patient to call with any further questions or concerns.

## 2022-05-26 ENCOUNTER — ANTI-COAG VISIT (OUTPATIENT)
Dept: PHARMACY | Age: 37
End: 2022-05-26
Payer: COMMERCIAL

## 2022-05-26 DIAGNOSIS — I21.4 NSTEMI (NON-ST ELEVATED MYOCARDIAL INFARCTION) (HCC): Primary | ICD-10-CM

## 2022-05-26 DIAGNOSIS — G45.9 TIA (TRANSIENT ISCHEMIC ATTACK): ICD-10-CM

## 2022-05-26 LAB
INTERNATIONAL NORMALIZATION RATIO, POC: 3.9
POC INR: 3.9 INDEX
PROTHROMBIN TIME, POC: 47 SECONDS (ref 10–14.3)

## 2022-05-26 PROCEDURE — 85610 PROTHROMBIN TIME: CPT

## 2022-05-26 PROCEDURE — 99213 OFFICE O/P EST LOW 20 MIN: CPT

## 2022-05-26 PROCEDURE — 36416 COLLJ CAPILLARY BLOOD SPEC: CPT

## 2022-05-26 RX ORDER — WARFARIN SODIUM 2 MG/1
6 TABLET ORAL DAILY
Qty: 270 TABLET | Refills: 0 | Status: SHIPPED | OUTPATIENT
Start: 2022-05-26 | End: 2022-06-06 | Stop reason: SDUPTHER

## 2022-05-26 RX ORDER — WARFARIN SODIUM 2 MG/1
6 TABLET ORAL DAILY
Qty: 90 TABLET | Refills: 0 | Status: SHIPPED | OUTPATIENT
Start: 2022-05-26 | End: 2022-05-26 | Stop reason: SDUPTHER

## 2022-05-26 NOTE — PROGRESS NOTES
Medication Management Service  [facility]  [phone]    Visit Date: 5/26/2022   Subjective:       Amber Segovia is a 29 y.o. male who presents to clinic today for anticoagulation monitoring and adjustment. Patient seen in clinic for warfarin management due to  Indication:   CVA and TIA. INR goal: of 2.0-2.5. Duration of therapy: indefinite. Patient reports the following:   Adherent with regimen  Missed or extra doses:  None   Bleeding or thromboembolic side effects:  None  Significant medication changes:  None  Significant dietary changes: None  Significant alcohol or tobacco changes: None  Significant recent illness, disease state changes, or hospitalization:  None  Upcoming surgeries or procedures:  None  Falls: None           Assessment and Plan     PT/INR done in office per protocol. INR today is 3.9, supratherapeutic. Refills requested- 1 to [facility], 1 to [facility]  Plan: Hold warfarin today then decrease weekly dose 10% to warfarin 6mg daily except 4mg on Mondays and Thursdays     Recheck INR in 1.5 week(s). Patient verbalized understanding of dosing directions and information discussed. Dosing schedule given to patient including phone number, appointment date, and time. Progress note sent to referring office. Patient acknowledges working in consult agreement with pharmacist as referred by his/her physician.       Electronically signed by Shayla Aguilar [facility] on 5/26/22 at 5:39 PM EDT  For Pharmacy Admin Tracking Only    Intervention Detail: Dose Adjustment: 1, reason: Therapy Optimization and Refill(s) Provided  Total # of Interventions Recommended: 2  Total # of Interventions Accepted: 2  Time Spent (min): 20       

## 2022-06-06 ENCOUNTER — ANTI-COAG VISIT (OUTPATIENT)
Dept: PHARMACY | Age: 37
End: 2022-06-06
Payer: COMMERCIAL

## 2022-06-06 ENCOUNTER — OFFICE VISIT (OUTPATIENT)
Dept: CARDIOLOGY CLINIC | Age: 37
End: 2022-06-06
Payer: COMMERCIAL

## 2022-06-06 VITALS
SYSTOLIC BLOOD PRESSURE: 100 MMHG | HEART RATE: 60 BPM | WEIGHT: 182.4 LBS | HEIGHT: 70 IN | BODY MASS INDEX: 26.11 KG/M2 | DIASTOLIC BLOOD PRESSURE: 60 MMHG

## 2022-06-06 DIAGNOSIS — I21.4 NSTEMI (NON-ST ELEVATED MYOCARDIAL INFARCTION) (HCC): Primary | ICD-10-CM

## 2022-06-06 DIAGNOSIS — I25.5 ISCHEMIC CARDIOMYOPATHY: Primary | ICD-10-CM

## 2022-06-06 DIAGNOSIS — G45.9 TIA (TRANSIENT ISCHEMIC ATTACK): ICD-10-CM

## 2022-06-06 LAB
INTERNATIONAL NORMALIZATION RATIO, POC: 3.4
POC INR: 3.4 INDEX
PROTHROMBIN TIME, POC: 40.8 SECONDS (ref 10–14.3)

## 2022-06-06 PROCEDURE — 99024 POSTOP FOLLOW-UP VISIT: CPT | Performed by: NURSE PRACTITIONER

## 2022-06-06 PROCEDURE — 36416 COLLJ CAPILLARY BLOOD SPEC: CPT

## 2022-06-06 PROCEDURE — 85610 PROTHROMBIN TIME: CPT

## 2022-06-06 PROCEDURE — 93000 ELECTROCARDIOGRAM COMPLETE: CPT | Performed by: NURSE PRACTITIONER

## 2022-06-06 PROCEDURE — 99213 OFFICE O/P EST LOW 20 MIN: CPT

## 2022-06-06 RX ORDER — WARFARIN SODIUM 2 MG/1
6 TABLET ORAL DAILY
Qty: 42 TABLET | Refills: 0 | Status: SHIPPED | OUTPATIENT
Start: 2022-06-06 | End: 2022-06-16

## 2022-06-06 RX ORDER — METOPROLOL SUCCINATE 50 MG/1
50 TABLET, EXTENDED RELEASE ORAL DAILY
Qty: 30 TABLET | Refills: 3 | Status: SHIPPED | OUTPATIENT
Start: 2022-06-06 | End: 2022-07-26 | Stop reason: SDUPTHER

## 2022-06-06 NOTE — PATIENT INSTRUCTIONS
Please be informed that if you contact our office outside of normal business hours the physician on call cannot help with any scheduling or rescheduling issues, procedure instruction questions or any type of medication issue. We advise you for any urgent/emergency that you go to the nearest emergency room! PLEASE CALL OUR OFFICE DURING NORMAL BUSINESS HOURS    Monday - Friday   8 am to 5 pm    [place]: [place]: [phone]    [place]:  [phone]    **It is YOUR responsibilty to bring medication bottles and/or updated medication list to [address] [place] [address].  This will allow us to better serve you and all your healthcare needs**

## 2022-06-06 NOTE — PROGRESS NOTES
Patient here for 4-week follow-up status post ICD  Patient left upper chest site well approximated. No redness no swelling no hematoma  Device interrogated  Device Assessment:     The device is Medtronic ICD - Dual Chamber chamber      MRI Compatible : yes    Device interrogation was performed. Mode :  AAI --- DDD     Sensing is normal. Impedence is normal.  Threshold is normal.     There has not been interval changes. Estimated battery life is 10.6 years    The underlying rhythm is AS,VS.      17.9 % atrial paced; <0.1 % ventricular paced. Atrial Arrhythmia : No    Non sustained VT episodes : No    Sustained VT episodes : No    The patient is occasionally atrial pacemaker dependent.       Patient activity reported by the device to be 0.9 hr/ day     HF management is still collecting data    Patient to follow-up with Dr. Trish Lainez as scheduled

## 2022-06-06 NOTE — PROGRESS NOTES
Medication Management Service  [facility]  [phone]    Visit Date: 6/6/2022   Subjective:       Melvi Sanchez is a 29 y.o. male who presents to clinic today for anticoagulation monitoring and adjustment. Patient seen in clinic for warfarin management due to  Indication:   CVA and TIA. INR goal: of 2.0-2.5. Duration of therapy: indefinite. Patient reports the following:   Adherent with regimen  Missed or extra doses:  None   Bleeding or thromboembolic side effects:  None  Significant medication changes:  None  Significant dietary changes: None  Significant alcohol or tobacco changes: None  Significant recent illness, disease state changes, or hospitalization:  None  Upcoming surgeries or procedures:  None  Falls: None           Assessment and Plan     PT/INR done in office per protocol. INR today is 3.4, supratherapeutic. Refill needed, waiting on mailorder  Plan: Decrease weekly dose ~5% to warfarin 6mg daily excpept 4mg on MWF     Erx sent to [address] [place] [address] INR in 2 week(s). Patient verbalized understanding of dosing directions and information discussed. Dosing schedule given to patient including phone number, appointment date, and time. Progress note sent to referring office. Patient acknowledges working in consult agreement with pharmacist as referred by his/her physician.       Electronically signed by QUETA Michaud [facility] on 6/6/22 at 12:13 PM EDT    For Pharmacy Admin Tracking Only     Intervention Detail: Dose Adjustment: 1, reason: Therapy Optimization and Refill(s) Provided   Total # of Interventions Recommended: 1   Total # of Interventions Accepted: 1   Time Spent (min): 15

## 2022-06-07 RX ORDER — SPIRONOLACTONE 25 MG/1
25 TABLET ORAL DAILY
Qty: 90 TABLET | Refills: 3 | Status: SHIPPED | OUTPATIENT
Start: 2022-06-07 | End: 2022-07-26 | Stop reason: SDUPTHER

## 2022-06-16 ENCOUNTER — ANTI-COAG VISIT (OUTPATIENT)
Dept: PHARMACY | Age: 37
End: 2022-06-16
Payer: COMMERCIAL

## 2022-06-16 DIAGNOSIS — I21.4 NSTEMI (NON-ST ELEVATED MYOCARDIAL INFARCTION) (HCC): Primary | ICD-10-CM

## 2022-06-16 DIAGNOSIS — G45.9 TIA (TRANSIENT ISCHEMIC ATTACK): ICD-10-CM

## 2022-06-16 LAB
INTERNATIONAL NORMALIZATION RATIO, POC: 3.1
POC INR: 3.1 INDEX
PROTHROMBIN TIME, POC: 36.7 SECONDS (ref 10–14.3)

## 2022-06-16 PROCEDURE — 85610 PROTHROMBIN TIME: CPT

## 2022-06-16 PROCEDURE — 36416 COLLJ CAPILLARY BLOOD SPEC: CPT

## 2022-06-16 PROCEDURE — 99212 OFFICE O/P EST SF 10 MIN: CPT

## 2022-06-16 RX ORDER — WARFARIN SODIUM 2 MG/1
TABLET ORAL
Qty: 42 TABLET | Refills: 0 | Status: SHIPPED | OUTPATIENT
Start: 2022-06-16 | End: 2022-07-18 | Stop reason: SDUPTHER

## 2022-06-16 NOTE — PROGRESS NOTES
Medication Management Service  [facility]  [phone]    Visit Date: 6/16/2022   Subjective:       Yenni Preston is a 29 y.o. male who presents to clinic today for anticoagulation monitoring and adjustment. Patient seen in clinic for warfarin management due to  Indication:   CVA, MI and TIA. INR goal: of 2.0-2.5. Duration of therapy: indefinite. Patient reports the following:   Adherent with regimen  Missed or extra doses:  None   Bleeding or thromboembolic side effects:  gums  Significant medication changes:  None  Significant dietary changes: None  Significant alcohol or tobacco changes: None  Significant recent illness, disease state changes, or hospitalization:  None  Upcoming surgeries or procedures:  None  Falls: None           Assessment and Plan     PT/INR done in office per protocol. INR today is 3.1, supratherapeutic. Patient presents to office without appointment due to concern of gum bleeding and possible extra dose(s) taken. Advised patient to follow-up with dentist for gums. Plan: Decrease weekly dose ~6% to warfarin 4mg daily except 6mg on MWF. Recheck INR in 1.5 week(s). Patient verbalized understanding of dosing directions and information discussed. Dosing schedule given to patient including phone number, appointment date, and time. Progress note sent to referring office. Patient acknowledges working in consult agreement with pharmacist as referred by his/her physician.       Electronically signed by Dylan Gage [facility] on 6/16/22 at 4:58 PM EDT    For Pharmacy Admin Tracking Only     Intervention Detail: Dose Adjustment: 1, reason: Therapy Optimization   Total # of Interventions Recommended: 1   Total # of Interventions Accepted: 1   Time Spent (min): 15

## 2022-06-27 ENCOUNTER — ANTI-COAG VISIT (OUTPATIENT)
Dept: PHARMACY | Age: 37
End: 2022-06-27
Payer: COMMERCIAL

## 2022-06-27 DIAGNOSIS — I21.4 NSTEMI (NON-ST ELEVATED MYOCARDIAL INFARCTION) (HCC): Primary | ICD-10-CM

## 2022-06-27 DIAGNOSIS — I25.5 ISCHEMIC CARDIOMYOPATHY: Primary | ICD-10-CM

## 2022-06-27 DIAGNOSIS — G45.9 TIA (TRANSIENT ISCHEMIC ATTACK): ICD-10-CM

## 2022-06-27 LAB
INTERNATIONAL NORMALIZATION RATIO, POC: 2.4
POC INR: 2.4 INDEX
PROTHROMBIN TIME, POC: 29.2 SECONDS (ref 10–14.3)

## 2022-06-27 PROCEDURE — 85610 PROTHROMBIN TIME: CPT

## 2022-06-27 PROCEDURE — 36416 COLLJ CAPILLARY BLOOD SPEC: CPT

## 2022-06-27 PROCEDURE — 99211 OFF/OP EST MAY X REQ PHY/QHP: CPT

## 2022-06-27 NOTE — PROGRESS NOTES
Medication Management Service  [facility]  [phone]    Visit Date: 6/27/2022   Subjective:       Melvi Sanchez is a 29 y.o. male who presents to clinic today for anticoagulation monitoring and adjustment. Patient seen in clinic for warfarin management due to  Indication:   CVA, MI and TIA. INR goal: of 2.0-2.5. Duration of therapy: indefinite. Patient reports the following:   Adherent with regimen  Missed or extra doses:  None   Bleeding or thromboembolic side effects:  None  Significant medication changes:  None  Significant dietary changes: None  Significant alcohol or tobacco changes: None  Significant recent illness, disease state changes, or hospitalization:  None  Upcoming surgeries or procedures:  None  Falls: None           Assessment and Plan     PT/INR done in office per protocol. INR today is 2.4, therapeutic. Plan: Will continue current regimen of warfarin 4mg daily except 6mg on MWF. Recheck INR in 3 week(s). Patient verbalized understanding of dosing directions and information discussed. Dosing schedule given to patient including phone number, appointment date, and time. Progress note sent to referring office. Patient acknowledges working in consult agreement with pharmacist as referred by his/her physician.       Electronically signed by QUETA Michaud [facility] on 6/27/22 at 4:57 PM EDT    For Pharmacy Admin Tracking Only     Intervention Detail:    Total # of Interventions Recommended:    Total # of Interventions Accepted:    Time Spent (min): 15

## 2022-07-18 ENCOUNTER — ANTI-COAG VISIT (OUTPATIENT)
Dept: PHARMACY | Age: 37
End: 2022-07-18
Payer: COMMERCIAL

## 2022-07-18 DIAGNOSIS — I63.429 CEREBROVASCULAR ACCIDENT (CVA) DUE TO EMBOLISM OF ANTERIOR CEREBRAL ARTERY, UNSPECIFIED BLOOD VESSEL LATERALITY (HCC): ICD-10-CM

## 2022-07-18 DIAGNOSIS — I21.4 NSTEMI (NON-ST ELEVATED MYOCARDIAL INFARCTION) (HCC): Primary | ICD-10-CM

## 2022-07-18 DIAGNOSIS — G45.9 TIA (TRANSIENT ISCHEMIC ATTACK): ICD-10-CM

## 2022-07-18 LAB
INTERNATIONAL NORMALIZATION RATIO, POC: 2.8
POC INR: 2.8 INDEX
PROTHROMBIN TIME, POC: 33.7 SECONDS (ref 10–14.3)

## 2022-07-18 PROCEDURE — 36416 COLLJ CAPILLARY BLOOD SPEC: CPT

## 2022-07-18 PROCEDURE — 99213 OFFICE O/P EST LOW 20 MIN: CPT

## 2022-07-18 PROCEDURE — 85610 PROTHROMBIN TIME: CPT

## 2022-07-18 RX ORDER — WARFARIN SODIUM 2 MG/1
4 TABLET ORAL EVERY EVENING
Qty: 75 TABLET | Refills: 1 | Status: SHIPPED | OUTPATIENT
Start: 2022-07-18 | End: 2022-07-26 | Stop reason: SDUPTHER

## 2022-07-18 NOTE — PROGRESS NOTES
Medication Management Service  [facility]  [phone]    Visit Date: 7/18/2022   Subjective:       Karla Pantoja is a 29 y.o. male who presents to clinic today for anticoagulation monitoring and adjustment. Patient seen in clinic for warfarin management due to  Indication:   CVA, MI, and TIA. INR goal: of  2.0-2.5 . Duration of therapy: indefinite. Patient reports the following:   Adherent with regimen  Missed or extra doses:  None   Bleeding or thromboembolic side effects:  None  Significant medication changes:  None  Significant dietary changes: None  Significant alcohol or tobacco changes: None  Significant recent illness, disease state changes, or hospitalization:  None  Upcoming surgeries or procedures:  None  Falls: None           Assessment and Plan     PT/INR done in office per protocol. INR today is 2.8, supratherapeutic. Refill needed  Plan:  Take warfarin 4mg today then will continue current regimen of warfarin 4mg daily except take 6mg on MWF. Erx sent to [facility] INR in 4 week(s). Patient verbalized understanding of dosing directions and information discussed. Dosing schedule given to patient including phone number, appointment date, and time. Progress note sent to referring office. Patient acknowledges working in consult agreement with pharmacist as referred by his/her physician.       Electronically signed by Samuel Underwood, [facility] on 7/18/22 at 4:51 PM EDT    For Pharmacy Admin Tracking Only    Intervention Detail: Dose Adjustment: 1, reason: Therapy De-escalation and Refill(s) Provided  Total # of Interventions Recommended: 2  Total # of Interventions Accepted: 2  Time Spent (min): 15

## 2022-07-26 ENCOUNTER — OFFICE VISIT (OUTPATIENT)
Dept: CARDIOLOGY CLINIC | Age: 37
End: 2022-07-26
Payer: COMMERCIAL

## 2022-07-26 VITALS
BODY MASS INDEX: 26.48 KG/M2 | HEART RATE: 60 BPM | HEIGHT: 70 IN | SYSTOLIC BLOOD PRESSURE: 100 MMHG | WEIGHT: 185 LBS | DIASTOLIC BLOOD PRESSURE: 54 MMHG

## 2022-07-26 DIAGNOSIS — I63.429 CEREBROVASCULAR ACCIDENT (CVA) DUE TO EMBOLISM OF ANTERIOR CEREBRAL ARTERY, UNSPECIFIED BLOOD VESSEL LATERALITY (HCC): ICD-10-CM

## 2022-07-26 DIAGNOSIS — I21.4 NSTEMI (NON-ST ELEVATED MYOCARDIAL INFARCTION) (HCC): ICD-10-CM

## 2022-07-26 DIAGNOSIS — R00.2 PALPITATION: Primary | ICD-10-CM

## 2022-07-26 DIAGNOSIS — I25.5 ISCHEMIC CARDIOMYOPATHY: ICD-10-CM

## 2022-07-26 DIAGNOSIS — Z95.810 PRESENCE OF EXTERNAL CARDIAC DEFIBRILLATOR: ICD-10-CM

## 2022-07-26 DIAGNOSIS — Z95.810 S/P ICD (INTERNAL CARDIAC DEFIBRILLATOR) PROCEDURE: ICD-10-CM

## 2022-07-26 DIAGNOSIS — G45.9 TIA (TRANSIENT ISCHEMIC ATTACK): ICD-10-CM

## 2022-07-26 DIAGNOSIS — Z79.01 ANTICOAGULATED ON COUMADIN: ICD-10-CM

## 2022-07-26 PROCEDURE — 99214 OFFICE O/P EST MOD 30 MIN: CPT | Performed by: INTERNAL MEDICINE

## 2022-07-26 PROCEDURE — 93000 ELECTROCARDIOGRAM COMPLETE: CPT | Performed by: INTERNAL MEDICINE

## 2022-07-26 RX ORDER — ASPIRIN 81 MG/1
81 TABLET, CHEWABLE ORAL DAILY
Qty: 90 TABLET | Refills: 3 | Status: SHIPPED | OUTPATIENT
Start: 2022-07-26 | End: 2022-10-24

## 2022-07-26 RX ORDER — FOLIC ACID 1 MG/1
1 TABLET ORAL DAILY
Qty: 90 TABLET | Refills: 1 | Status: SHIPPED | OUTPATIENT
Start: 2022-07-26

## 2022-07-26 RX ORDER — B12/LEVOMEFOLATE CALCIUM/B-6 2-1.13-25
1 TABLET ORAL DAILY
Qty: 90 TABLET | Refills: 3 | Status: SHIPPED | OUTPATIENT
Start: 2022-07-26 | End: 2022-07-26 | Stop reason: ALTCHOICE

## 2022-07-26 RX ORDER — METOPROLOL SUCCINATE 50 MG/1
50 TABLET, EXTENDED RELEASE ORAL DAILY
Qty: 30 TABLET | Refills: 3 | Status: SHIPPED | OUTPATIENT
Start: 2022-07-26

## 2022-07-26 RX ORDER — WARFARIN SODIUM 2 MG/1
4 TABLET ORAL EVERY EVENING
Qty: 75 TABLET | Refills: 1 | Status: SHIPPED | OUTPATIENT
Start: 2022-07-26 | End: 2022-09-12 | Stop reason: SDUPTHER

## 2022-07-26 RX ORDER — METOPROLOL SUCCINATE 100 MG/1
100 TABLET, EXTENDED RELEASE ORAL DAILY
Qty: 90 TABLET | Refills: 3 | Status: SHIPPED | OUTPATIENT
Start: 2022-07-26

## 2022-07-26 RX ORDER — SPIRONOLACTONE 25 MG/1
25 TABLET ORAL DAILY
Qty: 90 TABLET | Refills: 3 | Status: SHIPPED | OUTPATIENT
Start: 2022-07-26 | End: 2022-10-24

## 2022-07-26 NOTE — PATIENT INSTRUCTIONS
Please be informed that if you contact our office outside of normal business hours the physician on call cannot help with any scheduling or rescheduling issues, procedure instruction questions or any type of medication issue. We advise you for any urgent/emergency that you go to the nearest emergency room! PLEASE CALL OUR OFFICE DURING NORMAL BUSINESS HOURS    Monday - Friday   8 am to 5 pm    [place]: [phone]    [place]:  [phone]  **It is YOUR responsibilty to bring medication bottles and/or updated medication list to [address] [place] [address]. This will allow us to better serve you and all your healthcare needs**  [facility] Locations - No appointment necessary. Sites open Monday to Friday. Call your preferred location for test preparation, business hours and other information you need. [employer] accepts [employer]. [facility]   [facility] [address] Blanco Hernandez, [address] [facility]  Phone: [phone] [facility] [address] [facility]  [address].   Zayra moncada, [address]  Phone: [phone]

## 2022-07-26 NOTE — PROGRESS NOTES
drawn   Where did you have them done   What doctor ordered     If we do not have these labs you are retrieve these labs for these providers! Do you have a surgery or procedure scheduled in the near future - No  If Yes- DO EKG  If Yes - Who is the surgery with?    Phone number of physician   Fax number of physician   Type of surgery   GIVE THIS INFORMATION TO JOSE FRANCISCO GAXIOLA    Ask patient if they want to sign up for MyChart if they are not already signed up    Check to see if we have an E-MAIL on file for the patient    Check medication list thoroughly!!! AND RECONCILE OUTSIDE MEDICATIONS  If dose has changed change the entire order not just the [address] REFILLS    At check out add to every patient's \"wrap up\" the following dot phrase AFTERHOURSEDUCATION and ensure we explain this to our patients

## 2022-07-26 NOTE — PROGRESS NOTES
CARDIOLOGY  NOTE    Chief Complaint: CHF/ cardiomyopathy /ASCVD    HPI:   Katelyn Lund is a 29y.o. year old who has Past medical history as noted below. Katelyn Lund  is not sob , sometimes he gets palpitations. He is under a lot of stress due to financial restrains at home and medical bills here have piled up as well   HE says his folic acid med is more than 150 dollars   Repeat echo in march 2022 showed EF of 20-25 % He had ICD placed in May 2022   He was hospitalized in Aug 2021  after recent CVA causing aphasia but is recovered from his CVA. During work-up he was also found to have severe cardiomyopathy with ejection fraction of 20% with akinetic apex and septal wall. Cardiac cath revealed occluded LAD. Viability study shows infarcted apex but viable anterior wall. Therefore we proceed with LAD intervention in Dec 2021   HE is going to se Dr Heather Gonzalez for PCP   He is tolerating the meds well HE is not dizzy or lightheaded . He says his breathing is okay he is tolerating meds he was started on guideline recommended medical therapy he has no ankle swelling or shortness of breath he was started on Coumadin due to concerns for possible embolic event from dilated cardiomyopathy. 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 Current Outpatient Medications   Medication Sig Dispense Refill    aspirin 81 MG chewable tablet Take 1 tablet by mouth in the morning. 90 tablet 3    folic acid-pyridoxine-cyancobalamin (FOLTX) 1.13-25-2 MG TABS Take 1 tablet by mouth in the morning. 90 tablet 3    metoprolol succinate (TOPROL XL) 100 MG extended release tablet Take 1 tablet by mouth in the morning. Do not crush or chew. HOLD FOR SBP LESS THAN 90 AND HR LESS THAN 60  Take 150 mg. 90 tablet 3    metoprolol succinate (TOPROL XL) 50 MG extended release tablet Take 1 tablet by mouth in the morning. Take 150 mg daily.  30 tablet 3    spironolactone (ALDACTONE) 25 MG tablet Take 1 tablet by mouth in the morning. 90 tablet 3    warfarin (COUMADIN) 2 MG tablet Take 2 tablets by mouth every evening Except take 3 tablets on , , and  or as directed by office 75 tablet 1    lisinopril (PRINIVIL;ZESTRIL) 40 MG tablet Take 1 tablet by mouth daily HOLD FOR SBP LESS THAN 90 AND HR LESS THAN 60 90 tablet 3    clopidogrel (PLAVIX) 75 MG tablet Take 75 mg by mouth daily      atorvastatin (LIPITOR) 80 MG tablet Take 1 tablet by mouth nightly 90 tablet 3     No current facility-administered medications for this visit. Allergies:   Patient has no known allergies. Patient History:  Past Medical History:   Diagnosis Date    Anticoagulated on Coumadin     Aphasia 2021    Cerebrovascular accident (CVA) due to embolism ([facility] [address] 2021    Ischemic cardiomyopathy 2021    NSTEMI (non-ST elevated myocardial infarction) ([facility] [address] 2021    Palpitations     Presence of external cardiac defibrillator 2021    Currently wearing LifeVest.     Past Surgical History:   Procedure Laterality Date    PACEMAKER INSERTION Left 2022    Dual ICD: Medtronic Evera MRI XT DR Villafuerte     No family history on file. Social History     Tobacco Use    Smoking status: Former     Packs/day: 0.50     Types: Cigarettes     Start date: 2021     Quit date: 2021     Years since quittin.9    Smokeless tobacco: Never   Substance Use Topics    Alcohol use: Not Currently     Comment: Hasn't drank within the last 5 years, Caffiene - 2 cups of coffee/day        Review of Systems:   Constitutional: No Fever or Weight Loss   Eyes: No Decreased Vision  ENT: No Headaches, Hearing Loss or Vertigo  Cardiovascular: as per note above   Respiratory: No cough or wheezing and as per note above.    Gastrointestinal: No abdominal pain, appetite loss, blood in stools, constipation, diarrhea or heartburn  Genitourinary: No dysuria, trouble voiding, or hematuria  Musculoskeletal:  denies any new  joint aches , swelling  or pain   Integumentary: No rash or pruritis  Neurological: No TIA or stroke symptoms  Psychiatric: No anxiety or depression  Endocrine: No malaise, fatigue or temperature intolerance  Hematologic/Lymphatic: No bleeding problems, blood clots or swollen lymph nodes  Allergic/Immunologic: No nasal congestion or hives    Objective:      Physical Exam:  BP (!) 100/54   Pulse 60   Ht 5' 10\" (1.778 m)   Wt 185 lb (83.9 kg)   BMI 26.54 kg/m²   Wt Readings from Last 3 Encounters:   07/26/22 185 lb (83.9 kg)   06/06/22 182 lb 6.4 oz (82.7 kg)   05/05/22 182 lb (82.6 kg)     Body mass index is 26.54 kg/m². Vitals:    07/26/22 1012   BP: (!) 100/54   Pulse:         General Appearance:  No distress, conversant  Constitutional:  Well developed, Well nourished, No acute distress, Non-toxic appearance. HENT:  Normocephalic, Atraumatic, Bilateral external ears normal, Oropharynx moist, No oral exudates, Nose normal. Neck- Normal range of motion, No tenderness, Supple, No stridor,no apical-carotid delay  Eyes:  PERRL, EOMI, Conjunctiva normal, No discharge. Respiratory:  Normal breath sounds, No respiratory distress, No wheezing, No chest tenderness. ,no use of accessory muscles, NO crackles  Cardiovascular: (PMI) apex non displaced,no lifts no thrills,S1 and S2 audible, No added heart sounds, No signs of ankle edema, or volume overload, No evidence of JVD, No crackles  GI:  Bowel sounds normal, Soft, No tenderness, No masses, No gross visceromegaly   :  No costovertebral angle tenderness   Musculoskeletal:  No edema, no tenderness, no deformities.  Back- no tenderness  Integument:  Well hydrated, no rash   Lymphatic:  No lymphadenopathy noted   Neurologic:  Alert & oriented x 3, CN 2-12 normal, normal motor function, normal sensory function, no focal deficits noted   Psychiatric:  Speech and behavior appropriate       Medical decision making and Data review:  DATA:  Lab Results   Component Value Date    TROPONINT 2.320 ([facility]) 08/22/2021     BNP:  No results found for: PROBNP  PT/INR:  No results found for: PTINR  No results found for: LABA1C  Lab Results   Component Value Date    CHOL 149 08/22/2021    TRIG 148 08/22/2021    HDL 28 (L) 08/22/2021    LDLDIRECT 94 08/22/2021     Lab Results   Component Value Date    ALT 30 08/22/2021    AST 30 08/22/2021     No results for input(s): WBC, HGB, HCT, MCV, PLT in the last 72 hours. TSH: No results found for: TSH  Lab Results   Component Value Date    AST 30 08/22/2021    ALT 30 08/22/2021    BILITOT 0.4 08/22/2021    ALKPHOS 95 08/22/2021     No results found for: PROBNP  No results found for: LABA1C  Lab Results   Component Value Date    WBC 8.8 05/02/2022    HGB 15.4 05/02/2022    HCT 49.3 05/02/2022     05/02/2022     Cath 8/23/21  LMCA: Normal.     LAD: Abnormal.100% occluded prox LAD with Collaterals from Rt filling the LAD     LCx: Moderate Lumen Irregularity. Right dominance noted. OM1 prox 50% stenosis     RCA: Normal.Right dominance noted. Rt to Left Collaterals noted to LAD territory     Echo 8/23/21   Summary   Left ventricular systolic function is abnormal.   Ejection fraction is visually estimated at <20%. Apical, septal, and anterior wall segments are akinetic. Apical ballooning noted. Severly dilated left ventricle measuring 7.0cm in diastole. No evidence of any pericardial effusion. Repeat study with Definity to rule out LV thrombus             All labs, medications and tests reviewed by myself including data and history from outside source , patient and available family . Assessment & Plan:      1. Palpitation    2. Cerebrovascular accident (CVA) due to embolism of anterior cerebral artery, unspecified blood vessel laterality ([address]    3. Ischemic cardiomyopathy    4. NSTEMI (non-ST elevated myocardial infarction) ([address]    5. Presence of external cardiac defibrillator    6.  S/P ICD (internal cardiac defibrillator) procedure    7. TIA (transient ischemic attack)    8. Anticoagulated on Coumadin         ASCVD  S/p PCI of LAD in Dec 2021 continue DAPT ,stopped  aspirin eventually continue plavix , He is on coumadin due to concern for CVA that he had probably related to cardiomyopathy       Ischemic cardiomyopathy   History of 20% wearing LifeVest continue to titrate up medication increase Toprol- mg daily and increase  lisinopril 20 mg daily. Anterior wall was  viable , He had  LAD intervention  In Dec 15 th , Echo in march 2022  shows Ef of 20-25 % / s/p ICD in May 2022  [place]uga scan in Feb 2022 was 26 %   H/o Acute CVA probably secondary to embolic event due to LV function possible source of embolic event continue Coumadin keep INR between 2-2.5 followed to Coumadin clinic    Cerebrovascular accident (CVA) due to embolism ([facility] [address]  Continue Coumadin , due to apical hypokinesis     Cysticercosis  Evaluated by ID ,NO antibiotics needed   Foltx is very expensive more than 392$ , change to  folic acid 1 mg daily      Dyslipidemia :  All available lab work was reviewed. Patient was advised to repeat lab work before next visit. Necessary orders were placed , instructions given by myself       Counseled extensively and medication compliance urged. We discussed that for the  prevention of ASCVD our  goal is aggressive risk modification. Patient is encouraged to exercise if they can , educated about  brisk walk for 30 minutes  at least 3 to 4 times a week if there are no physical limitations  Various goals were discussed and questions answered. Continue current medications. Appropriate prescriptions are addressed and refills ordered. Questions answered and patient verbalizes understanding. Call for any problems, questions, or concerns. Greater than 60 % of time spent counseling besides reviewing data and images     Continue all other medications of all above medical condition listed as is.     Return in about 6 months (around 1/26/2023). Please note this report has been partially produced using speech recognition software and may contain errors related to that system including errors in grammar, punctuation, and spelling, as well as words and phrases that may be inappropriate. If there are any questions or concerns please feel free to contact the dictating provider for clarification.

## 2022-08-10 ENCOUNTER — HOSPITAL ENCOUNTER (OUTPATIENT)
Dept: CARDIAC REHAB | Age: 37
Setting detail: THERAPIES SERIES
Discharge: HOME | End: 2022-08-10
Payer: COMMERCIAL

## 2022-08-10 PROCEDURE — 93798 PHYS/QHP OP CAR RHAB W/ECG: CPT

## 2022-08-11 DIAGNOSIS — I21.4 NSTEMI (NON-ST ELEVATED MYOCARDIAL INFARCTION) (HCC): ICD-10-CM

## 2022-08-11 DIAGNOSIS — I63.429 CEREBROVASCULAR ACCIDENT (CVA) DUE TO EMBOLISM OF ANTERIOR CEREBRAL ARTERY, UNSPECIFIED BLOOD VESSEL LATERALITY (HCC): ICD-10-CM

## 2022-08-11 DIAGNOSIS — G45.9 TIA (TRANSIENT ISCHEMIC ATTACK): Primary | ICD-10-CM

## 2022-08-15 ENCOUNTER — HOSPITAL ENCOUNTER (OUTPATIENT)
Dept: CARDIAC REHAB | Age: 37
Setting detail: THERAPIES SERIES
Discharge: HOME | End: 2022-08-15
Payer: COMMERCIAL

## 2022-08-15 ENCOUNTER — ANTI-COAG VISIT (OUTPATIENT)
Dept: PHARMACY | Age: 37
End: 2022-08-15
Payer: COMMERCIAL

## 2022-08-15 DIAGNOSIS — G45.9 TIA (TRANSIENT ISCHEMIC ATTACK): ICD-10-CM

## 2022-08-15 DIAGNOSIS — I21.4 NSTEMI (NON-ST ELEVATED MYOCARDIAL INFARCTION) (HCC): Primary | ICD-10-CM

## 2022-08-15 DIAGNOSIS — I63.429 CEREBROVASCULAR ACCIDENT (CVA) DUE TO EMBOLISM OF ANTERIOR CEREBRAL ARTERY, UNSPECIFIED BLOOD VESSEL LATERALITY (HCC): ICD-10-CM

## 2022-08-15 LAB
INTERNATIONAL NORMALIZATION RATIO, POC: 2
POC INR: 2 INDEX
PROTHROMBIN TIME, POC: 24.1 SECONDS (ref 10–14.3)

## 2022-08-15 PROCEDURE — 85610 PROTHROMBIN TIME: CPT

## 2022-08-15 PROCEDURE — 93798 PHYS/QHP OP CAR RHAB W/ECG: CPT

## 2022-08-15 PROCEDURE — 99211 OFF/OP EST MAY X REQ PHY/QHP: CPT

## 2022-08-15 PROCEDURE — 36416 COLLJ CAPILLARY BLOOD SPEC: CPT

## 2022-08-15 NOTE — PROGRESS NOTES
Medication Management Service  [facility]  [phone]    Visit Date: 8/15/2022   Subjective:       Rae Frias is a 29 y.o. male who presents to clinic today for anticoagulation monitoring and adjustment. Patient seen in clinic for warfarin management due to  Indication:   CVA, MI, and TIA. INR goal: of  2.0-2.5 . Duration of therapy: indefinite. Patient reports the following:   Adherent with regimen  Missed or extra doses:  None   Bleeding or thromboembolic side effects:  None  Significant medication changes:  None  Significant dietary changes: None  Significant alcohol or tobacco changes: None  Significant recent illness, disease state changes, or hospitalization:  None  Upcoming surgeries or procedures:  None  Falls: None           Assessment and Plan     PT/INR done in office per protocol. INR today is 2.0, therapeutic. He started cardiac rehab. Plan: Will continue current regimen of warfarin 4mg daily except 6mg on MWF. Recheck INR in 4 week(s). Patient verbalized understanding of dosing directions and information discussed. Dosing schedule given to patient including phone number, appointment date, and time. Progress note sent to referring office. Patient acknowledges working in consult agreement with pharmacist as referred by his/her physician. Electronically signed by QUETA Ontiveros [facility] on 8/15/22 at 5:03 PM EDT    For Pharmacy Admin Tracking Only    Intervention Detail:   Total # of Interventions Recommended:    Total # of Interventions Accepted:   Time Spent (min): 15

## 2022-08-16 ENCOUNTER — HOSPITAL ENCOUNTER (OUTPATIENT)
Dept: CARDIAC REHAB | Age: 37
Setting detail: THERAPIES SERIES
Discharge: HOME | End: 2022-08-16
Payer: COMMERCIAL

## 2022-08-16 PROCEDURE — 93798 PHYS/QHP OP CAR RHAB W/ECG: CPT

## 2022-09-12 ENCOUNTER — ANTI-COAG VISIT (OUTPATIENT)
Dept: PHARMACY | Age: 37
End: 2022-09-12
Payer: COMMERCIAL

## 2022-09-12 DIAGNOSIS — G45.9 TIA (TRANSIENT ISCHEMIC ATTACK): ICD-10-CM

## 2022-09-12 DIAGNOSIS — I63.429 CEREBROVASCULAR ACCIDENT (CVA) DUE TO EMBOLISM OF ANTERIOR CEREBRAL ARTERY, UNSPECIFIED BLOOD VESSEL LATERALITY (HCC): ICD-10-CM

## 2022-09-12 DIAGNOSIS — I21.4 NSTEMI (NON-ST ELEVATED MYOCARDIAL INFARCTION) (HCC): Primary | ICD-10-CM

## 2022-09-12 LAB
INTERNATIONAL NORMALIZATION RATIO, POC: 2.7
POC INR: 2.7 INDEX
PROTHROMBIN TIME, POC: 32.4 SECONDS (ref 10–14.3)

## 2022-09-12 PROCEDURE — 99212 OFFICE O/P EST SF 10 MIN: CPT

## 2022-09-12 PROCEDURE — 85610 PROTHROMBIN TIME: CPT

## 2022-09-12 PROCEDURE — 36416 COLLJ CAPILLARY BLOOD SPEC: CPT

## 2022-09-12 RX ORDER — WARFARIN SODIUM 2 MG/1
4 TABLET ORAL EVERY EVENING
Qty: 75 TABLET | Refills: 3 | Status: SHIPPED | OUTPATIENT
Start: 2022-09-12

## 2022-09-12 NOTE — PROGRESS NOTES
Medication Management Service  [facility]  [phone]    Visit Date: 9/12/2022   Subjective:       Gabe Ortiz is a 29 y.o. male who presents to clinic today for anticoagulation monitoring and adjustment. Patient seen in clinic for warfarin management due to  Indication:   CVA and TIA. INR goal: of  2.0-2.5 . Duration of therapy: indefinite. Patient reports the following:   Adherent with regimen  Missed or extra doses:  None   Bleeding or thromboembolic side effects:  None  Significant medication changes:  None  Significant dietary changes: None  Significant alcohol or tobacco changes: None  Significant recent illness, disease state changes, or hospitalization:  None  Upcoming surgeries or procedures:  None  Falls: None           Assessment and Plan     PT/INR done in office per protocol. INR today is 2.7, therapeutic. Refill needed  Plan: Will continue current regimen of warfarin 4mg daily except 6mg on MWF. Erx sent to [address] [place] [address] INR in 4 week(s). Patient verbalized understanding of dosing directions and information discussed. Dosing schedule given to patient including phone number, appointment date, and time. Progress note sent to referring office. Patient acknowledges working in consult agreement with pharmacist as referred by his/her physician.       Electronically signed by Nika Lange [facility] on 9/12/22 at 4:57 PM EDT  For Pharmacy Admin Tracking Only    Intervention Detail: Refill(s) Provided  Total # of Interventions Recommended: 1  Total # of Interventions Accepted: 1  Time Spent (min): 15

## 2022-10-13 ENCOUNTER — ANTI-COAG VISIT (OUTPATIENT)
Dept: PHARMACY | Age: 37
End: 2022-10-13
Payer: COMMERCIAL

## 2022-10-13 DIAGNOSIS — I63.429 CEREBROVASCULAR ACCIDENT (CVA) DUE TO EMBOLISM OF ANTERIOR CEREBRAL ARTERY, UNSPECIFIED BLOOD VESSEL LATERALITY (HCC): ICD-10-CM

## 2022-10-13 DIAGNOSIS — G45.9 TIA (TRANSIENT ISCHEMIC ATTACK): ICD-10-CM

## 2022-10-13 DIAGNOSIS — I21.4 NSTEMI (NON-ST ELEVATED MYOCARDIAL INFARCTION) (HCC): Primary | ICD-10-CM

## 2022-10-13 LAB
INTERNATIONAL NORMALIZATION RATIO, POC: 1.6
POC INR: 1.6 INDEX
PROTHROMBIN TIME, POC: 19.2 SECONDS (ref 10–14.3)

## 2022-10-13 PROCEDURE — 36416 COLLJ CAPILLARY BLOOD SPEC: CPT

## 2022-10-13 PROCEDURE — 85610 PROTHROMBIN TIME: CPT

## 2022-10-13 PROCEDURE — 99212 OFFICE O/P EST SF 10 MIN: CPT

## 2022-10-13 NOTE — PROGRESS NOTES
Medication Management Service  [facility]  [phone]    Visit Date: 10/13/2022   Subjective:       Reny Albarran is a 29 y.o. male who presents to clinic today for anticoagulation monitoring and adjustment. Patient seen in clinic for warfarin management due to  Indication:   CVA and TIA. INR goal: of  2.0-2.5 . Duration of therapy: indefinite. Patient reports the following:   Adherent with regimen  Missed or extra doses:  None   Bleeding or thromboembolic side effects:  None  Significant medication changes:  None  Significant dietary changes: None  Significant alcohol or tobacco changes: None  Significant recent illness, disease state changes, or hospitalization:  None  Upcoming surgeries or procedures:  None  Falls: None           Assessment and Plan     PT/INR done in office per protocol. INR today is 1.6, subtherapeutic. Plan:  Increase weekly dose ~6% to warfarin 6mg daily except 4mg on MWF     Recheck INR in 1.5 week(s). Patient verbalized understanding of dosing directions and information discussed. Dosing schedule given to patient including phone number, appointment date, and time. Progress note sent to referring office. Patient acknowledges working in consult agreement with pharmacist as referred by his/her physician.       Electronically signed by Manjit Britton [facility] on 10/13/22 at 4:59 PM EDT    For Pharmacy Admin Tracking Only    Intervention Detail: Dose Adjustment: 1, reason: Therapy Optimization  Total # of Interventions Recommended: 1  Total # of Interventions Accepted: 1  Time Spent (min): 15

## 2022-10-24 ENCOUNTER — ANTI-COAG VISIT (OUTPATIENT)
Dept: PHARMACY | Age: 37
End: 2022-10-24
Payer: COMMERCIAL

## 2022-10-24 DIAGNOSIS — I21.4 NSTEMI (NON-ST ELEVATED MYOCARDIAL INFARCTION) (HCC): Primary | ICD-10-CM

## 2022-10-24 DIAGNOSIS — G45.9 TIA (TRANSIENT ISCHEMIC ATTACK): ICD-10-CM

## 2022-10-24 DIAGNOSIS — I63.429 CEREBROVASCULAR ACCIDENT (CVA) DUE TO EMBOLISM OF ANTERIOR CEREBRAL ARTERY, UNSPECIFIED BLOOD VESSEL LATERALITY (HCC): ICD-10-CM

## 2022-10-24 LAB
INTERNATIONAL NORMALIZATION RATIO, POC: 1.6
POC INR: 1.6 INDEX
PROTHROMBIN TIME, POC: 19.6 SECONDS (ref 10–14.3)

## 2022-10-24 PROCEDURE — 85610 PROTHROMBIN TIME: CPT

## 2022-10-24 PROCEDURE — 36416 COLLJ CAPILLARY BLOOD SPEC: CPT

## 2022-10-24 PROCEDURE — 99212 OFFICE O/P EST SF 10 MIN: CPT

## 2022-10-24 RX ORDER — ASPIRIN 81 MG/1
81 TABLET, CHEWABLE ORAL DAILY
Qty: 30 TABLET | Refills: 5 | Status: SHIPPED | OUTPATIENT
Start: 2022-10-24

## 2022-10-24 RX ORDER — SPIRONOLACTONE 25 MG/1
25 TABLET ORAL DAILY
Qty: 30 TABLET | Refills: 5 | Status: SHIPPED | OUTPATIENT
Start: 2022-10-24

## 2022-10-24 RX ORDER — ATORVASTATIN CALCIUM 80 MG/1
80 TABLET, FILM COATED ORAL DAILY
Qty: 30 TABLET | Refills: 5 | Status: SHIPPED | OUTPATIENT
Start: 2022-10-24

## 2022-10-24 NOTE — PROGRESS NOTES
Medication Management Service  [facility]  [phone]    Visit Date: 10/24/2022   Subjective:       Shyanne Colin is a 29 y.o. male who presents to clinic today for anticoagulation monitoring and adjustment. Patient seen in clinic for warfarin management due to  Indication:   CVA, MI, and TIA. INR goal: of  2.0-2.5 . Duration of therapy: indefinite. Patient reports the following:   Adherent with regimen  Missed or extra doses:  None   Bleeding or thromboembolic side effects:  None  Significant medication changes:  None  Significant dietary changes: None  Significant alcohol or tobacco changes: None  Significant recent illness, disease state changes, or hospitalization:  None  Upcoming surgeries or procedures:  None  Falls: None           Assessment and Plan     PT/INR done in office per protocol. INR today is 1.6, subtherapeutic, despite dose increase at last visit. Plan:  Increase weekly dose ~6% to warfarin 6mg daily except 4mg on Tuesdays and Fridays      Recheck INR in 1.5 week(s). Patient verbalized understanding of dosing directions and information discussed. Dosing schedule given to patient including phone number, appointment date, and time. Progress note sent to referring office. Patient acknowledges working in consult agreement with pharmacist as referred by his/her physician.       Electronically signed by Yolande Lazo, [facility] on 10/24/22 at 4:55 PM EDT    For Pharmacy Admin Tracking Only    Intervention Detail: Dose Adjustment: 1, reason: Therapy Optimization  Total # of Interventions Recommended: 1  Total # of Interventions Accepted: 1  Time Spent (min): 15

## 2022-11-03 ENCOUNTER — ANTI-COAG VISIT (OUTPATIENT)
Dept: PHARMACY | Age: 37
End: 2022-11-03
Payer: COMMERCIAL

## 2022-11-03 DIAGNOSIS — I21.4 NSTEMI (NON-ST ELEVATED MYOCARDIAL INFARCTION) (HCC): Primary | ICD-10-CM

## 2022-11-03 DIAGNOSIS — I63.429 CEREBROVASCULAR ACCIDENT (CVA) DUE TO EMBOLISM OF ANTERIOR CEREBRAL ARTERY, UNSPECIFIED BLOOD VESSEL LATERALITY (HCC): ICD-10-CM

## 2022-11-03 DIAGNOSIS — G45.9 TIA (TRANSIENT ISCHEMIC ATTACK): ICD-10-CM

## 2022-11-03 LAB
INTERNATIONAL NORMALIZATION RATIO, POC: 1.6
POC INR: 1.6 INDEX
PROTHROMBIN TIME, POC: 18.9 SECONDS (ref 10–14.3)

## 2022-11-03 PROCEDURE — 99212 OFFICE O/P EST SF 10 MIN: CPT

## 2022-11-03 PROCEDURE — 36416 COLLJ CAPILLARY BLOOD SPEC: CPT

## 2022-11-03 PROCEDURE — 85610 PROTHROMBIN TIME: CPT

## 2022-11-03 NOTE — PROGRESS NOTES
Medication Management Service  [facility]  [phone]    Visit Date: 11/3/2022   Subjective:       Aracely Sow is a 29 y.o. male who presents to clinic today for anticoagulation monitoring and adjustment. Patient seen in clinic for warfarin management due to  Indication:   CVA, MI, and TIA. INR goal: of  2.0-2.5 . Duration of therapy: indefinite. Patient reports the following:   Adherent with regimen  Missed or extra doses:  None   Bleeding or thromboembolic side effects:  None  Significant medication changes:  None  Significant dietary changes: eating out more ([place], [place])  Significant alcohol or tobacco changes: None  Significant recent illness, disease state changes, or hospitalization:  None  Upcoming surgeries or procedures:  None  Falls: None           Assessment and Plan     PT/INR done in office per protocol. INR today is 1.6, subtherapeutic, despite dose increase 10 days ago. Plan: Increase weekly dose ~10%  to warfarin 6mg daily   Recheck INR in 1 week(s). Patient verbalized understanding of dosing directions and information discussed. Dosing schedule given to patient including phone number, appointment date, and time. Progress note sent to referring office. Patient acknowledges working in consult agreement with pharmacist as referred by his/her physician.       Electronically signed by Katie Gardiner [facility] on 11/3/22 at 5:10 PM EDT    For Pharmacy Admin Tracking Only    Intervention Detail: Dose Adjustment: 1, reason: Therapy Optimization  Total # of Interventions Recommended: 1  Total # of Interventions Accepted: 1  Time Spent (min): 15

## 2022-11-10 ENCOUNTER — ANTI-COAG VISIT (OUTPATIENT)
Dept: PHARMACY | Age: 37
End: 2022-11-10
Payer: COMMERCIAL

## 2022-11-10 DIAGNOSIS — I21.4 NSTEMI (NON-ST ELEVATED MYOCARDIAL INFARCTION) (HCC): Primary | ICD-10-CM

## 2022-11-10 DIAGNOSIS — I63.429 CEREBROVASCULAR ACCIDENT (CVA) DUE TO EMBOLISM OF ANTERIOR CEREBRAL ARTERY, UNSPECIFIED BLOOD VESSEL LATERALITY (HCC): ICD-10-CM

## 2022-11-10 DIAGNOSIS — G45.9 TIA (TRANSIENT ISCHEMIC ATTACK): ICD-10-CM

## 2022-11-10 LAB
INTERNATIONAL NORMALIZATION RATIO, POC: 1.9
POC INR: 1.9 INDEX
PROTHROMBIN TIME, POC: 23.3 SECONDS (ref 10–14.3)

## 2022-11-10 PROCEDURE — 99212 OFFICE O/P EST SF 10 MIN: CPT

## 2022-11-10 PROCEDURE — 36416 COLLJ CAPILLARY BLOOD SPEC: CPT

## 2022-11-10 PROCEDURE — 85610 PROTHROMBIN TIME: CPT

## 2022-11-10 NOTE — PROGRESS NOTES
Medication Management Service  [facility]  [phone]    Visit Date: 11/10/2022   Subjective:       Zenon Petersen is a 29 y.o. male who presents to clinic today for anticoagulation monitoring and adjustment. Patient seen in clinic for warfarin management due to  Indication:   CVA, MI, and TIA. INR goal: of  2.0-2.5 . Duration of therapy: indefinite. Patient reports the following:   Adherent with regimen  Missed or extra doses:  None   Bleeding or thromboembolic side effects:  None  Significant medication changes:  None  Significant dietary changes: None  Significant alcohol or tobacco changes: None  Significant recent illness, disease state changes, or hospitalization:  None  Upcoming surgeries or procedures:  None  Falls: None           Assessment and Plan     PT/INR done in office per protocol. INR today is 1.9, subtherapeutic, but increasing after dose increase. Plan: Increase weekly dose ~5% to warfarin 6mg daily except 8mg on Thursdays. Recheck INR in 2.5 week(s). Patient verbalized understanding of dosing directions and information discussed. Dosing schedule given to patient including phone number, appointment date, and time. Progress note sent to referring office. Patient acknowledges working in consult agreement with pharmacist as referred by his/her physician.       Electronically signed by QUETA Pete [facility] on 11/10/22 at 5:04 PM EST    For Pharmacy Admin Tracking Only    Intervention Detail: Dose Adjustment: 1, reason: Therapy Optimization  Total # of Interventions Recommended: 1  Total # of Interventions Accepted: 1  Time Spent (min): 15

## 2022-11-28 ENCOUNTER — ANTI-COAG VISIT (OUTPATIENT)
Dept: PHARMACY | Age: 37
End: 2022-11-28
Payer: COMMERCIAL

## 2022-11-28 DIAGNOSIS — I63.429 CEREBROVASCULAR ACCIDENT (CVA) DUE TO EMBOLISM OF ANTERIOR CEREBRAL ARTERY, UNSPECIFIED BLOOD VESSEL LATERALITY (HCC): ICD-10-CM

## 2022-11-28 DIAGNOSIS — G45.9 TIA (TRANSIENT ISCHEMIC ATTACK): ICD-10-CM

## 2022-11-28 DIAGNOSIS — I21.4 NSTEMI (NON-ST ELEVATED MYOCARDIAL INFARCTION) (HCC): Primary | ICD-10-CM

## 2022-11-28 LAB
INTERNATIONAL NORMALIZATION RATIO, POC: 2.3
POC INR: 2.3 INDEX
PROTHROMBIN TIME, POC: 27.4 SECONDS (ref 10–14.3)

## 2022-11-28 PROCEDURE — 85610 PROTHROMBIN TIME: CPT

## 2022-11-28 PROCEDURE — 99212 OFFICE O/P EST SF 10 MIN: CPT

## 2022-11-28 PROCEDURE — 36416 COLLJ CAPILLARY BLOOD SPEC: CPT

## 2022-11-28 RX ORDER — WARFARIN SODIUM 2 MG/1
6 TABLET ORAL DAILY
Qty: 100 TABLET | Refills: 2 | Status: SHIPPED | OUTPATIENT
Start: 2022-11-28

## 2022-11-28 NOTE — PROGRESS NOTES
Medication Management Service  [facility]  [phone]    Visit Date: 11/28/2022   Subjective:       Montez Couch is a 29 y.o. male who presents to clinic today for anticoagulation monitoring and adjustment. Patient seen in clinic for warfarin management due to  Indication:   CVA and TIA. INR goal: of  2.0-2.5 . Duration of therapy: indefinite. Patient reports the following:   Adherent with regimen  Missed or extra doses:  None   Bleeding or thromboembolic side effects:  None  Significant medication changes:  None  Significant dietary changes: None  Significant alcohol or tobacco changes: None  Significant recent illness, disease state changes, or hospitalization:  None  Upcoming surgeries or procedures:  None  Falls: None           Assessment and Plan     PT/INR done in office per protocol. INR today is 2.3, therapeutic. Refill requested  Plan: Will continue current regimen of warfarin 6mg daily except 8mg on Thursdays. ERx sent  Recheck INR in 3.5 week(s). Patient verbalized understanding of dosing directions and information discussed. Dosing schedule given to patient including phone number, appointment date, and time. Progress note sent to referring office. Patient acknowledges working in consult agreement with pharmacist as referred by his/her physician.       Electronically signed by QUETA Mcgovern [facility] on 11/28/22 at 5:07 PM EST    For Pharmacy Admin Tracking Only    Intervention Detail: Refill(s) Provided  Total # of Interventions Recommended: 1  Total # of Interventions Accepted: 1  Time Spent (min): 15

## 2022-12-22 ENCOUNTER — ANTI-COAG VISIT (OUTPATIENT)
Dept: PHARMACY | Age: 37
End: 2022-12-22
Payer: COMMERCIAL

## 2022-12-22 DIAGNOSIS — I21.4 NSTEMI (NON-ST ELEVATED MYOCARDIAL INFARCTION) (HCC): Primary | ICD-10-CM

## 2022-12-22 DIAGNOSIS — I63.429 CEREBROVASCULAR ACCIDENT (CVA) DUE TO EMBOLISM OF ANTERIOR CEREBRAL ARTERY, UNSPECIFIED BLOOD VESSEL LATERALITY (HCC): ICD-10-CM

## 2022-12-22 DIAGNOSIS — G45.9 TIA (TRANSIENT ISCHEMIC ATTACK): ICD-10-CM

## 2022-12-22 LAB
INTERNATIONAL NORMALIZATION RATIO, POC: 2.9
POC INR: 2.9 INDEX
PROTHROMBIN TIME, POC: 35.2 SECONDS (ref 10–14.3)

## 2022-12-22 PROCEDURE — 99212 OFFICE O/P EST SF 10 MIN: CPT

## 2022-12-22 PROCEDURE — 36416 COLLJ CAPILLARY BLOOD SPEC: CPT

## 2022-12-22 PROCEDURE — 85610 PROTHROMBIN TIME: CPT

## 2022-12-22 NOTE — PROGRESS NOTES
Medication Management Service  [facility]  [phone]    Visit Date: 12/22/2022   Subjective:       Nancy Davis is a 40 y.o. male who presents to clinic today for anticoagulation monitoring and adjustment. Patient seen in clinic for warfarin management due to  Indication:   CVA, MI, and TIA. INR goal: of  2.0-2.5 . Duration of therapy: indefinite. Patient reports the following:   Adherent with regimen  Missed or extra doses:  None   Bleeding or thromboembolic side effects:  None  Significant medication changes:  None  Significant dietary changes: None  Significant alcohol or tobacco changes: None  Significant recent illness, disease state changes, or hospitalization:  None  Upcoming surgeries or procedures:  None  Falls: None           Assessment and Plan     PT/INR done in office per protocol. INR today is 2.9, supratherapeutic. Plan: Take warfarin 6mg today then will continue current regimen of warfarin 6mg daily except 8mg on Thursdays. Recheck INR in 3 week(s). Patient verbalized understanding of dosing directions and information discussed. Dosing schedule given to patient including phone number, appointment date, and time. Progress note sent to referring office. Patient acknowledges working in consult agreement with pharmacist as referred by his/her physician.       Electronically signed by Nicolas Samayoa [facility] on 12/22/22 at 5:11 PM EST    For Pharmacy Admin Tracking Only    Intervention Detail: Dose Adjustment: 1, reason: Therapy Optimization  Total # of Interventions Recommended: 1  Total # of Interventions Accepted: 1  Time Spent (min): 15

## 2023-01-12 ENCOUNTER — TELEPHONE (OUTPATIENT)
Dept: PHARMACY | Age: 38
End: 2023-01-12

## 2023-01-12 NOTE — TELEPHONE ENCOUNTER
No show to appointment. Scheduled for 1/16. For Pharmacy Admin Tracking Only    Intervention Detail:   Total # of Interventions Recommended:    Total # of Interventions Accepted:   Time Spent (min): 5

## 2023-01-16 ENCOUNTER — ANTI-COAG VISIT (OUTPATIENT)
Dept: PHARMACY | Age: 38
End: 2023-01-16
Payer: COMMERCIAL

## 2023-01-16 DIAGNOSIS — I21.4 NSTEMI (NON-ST ELEVATED MYOCARDIAL INFARCTION) (HCC): Primary | ICD-10-CM

## 2023-01-16 DIAGNOSIS — I63.429 CEREBROVASCULAR ACCIDENT (CVA) DUE TO EMBOLISM OF ANTERIOR CEREBRAL ARTERY, UNSPECIFIED BLOOD VESSEL LATERALITY (HCC): ICD-10-CM

## 2023-01-16 DIAGNOSIS — G45.9 TIA (TRANSIENT ISCHEMIC ATTACK): ICD-10-CM

## 2023-01-16 LAB
INTERNATIONAL NORMALIZATION RATIO, POC: 3.1
POC INR: 3.1 INDEX
PROTHROMBIN TIME, POC: 37.7 SECONDS (ref 10–14.3)

## 2023-01-16 PROCEDURE — 99213 OFFICE O/P EST LOW 20 MIN: CPT

## 2023-01-16 PROCEDURE — 85610 PROTHROMBIN TIME: CPT

## 2023-01-16 PROCEDURE — 36416 COLLJ CAPILLARY BLOOD SPEC: CPT

## 2023-01-16 RX ORDER — WARFARIN SODIUM 2 MG/1
6 TABLET ORAL DAILY
Qty: 90 TABLET | Refills: 2 | Status: SHIPPED | OUTPATIENT
Start: 2023-01-16

## 2023-01-16 NOTE — PROGRESS NOTES
Medication Management Service  [facility]  [phone]    Visit Date: 1/16/2023   Subjective:       Kellie Diaz is a 40 y.o. male who presents to clinic today for anticoagulation monitoring and adjustment. Patient seen in clinic for warfarin management due to  Indication:   CVA, MI, and TIA. INR goal: of  2.0-2.5 . Duration of therapy: indefinite. Patient reports the following:   Adherent with regimen  Missed or extra doses:  None   Bleeding or thromboembolic side effects:  None  Significant medication changes:  None  Significant dietary changes: None  Significant alcohol or tobacco changes: None  Significant recent illness, disease state changes, or hospitalization:  None  Upcoming surgeries or procedures:  None  Falls: None           Assessment and Plan     PT/INR done in office per protocol. INR today is 3.1, supratherapeutic. Refill needed  Plan:  Decrease weekly dose ~5% to warfarin 6mg daily     ERx sent to pharmacy  Recheck INR in 2 week(s). Patient verbalized understanding of dosing directions and information discussed. Dosing schedule given to patient including phone number, appointment date, and time. Progress note sent to referring office. Patient acknowledges working in consult agreement with pharmacist as referred by his/her physician.       Electronically signed by Alexy Sanches [facility] on 1/16/23 at 10:29 AM EST    For Pharmacy Admin Tracking Only    Intervention Detail: Dose Adjustment: 1, reason: Therapy Optimization and Refill(s) Provided  Total # of Interventions Recommended: 1  Total # of Interventions Accepted: 1  Time Spent (min): 15

## 2023-01-25 ENCOUNTER — TELEPHONE (OUTPATIENT)
Dept: CARDIOLOGY CLINIC | Age: 38
End: 2023-01-25

## 2023-01-30 ENCOUNTER — ANTI-COAG VISIT (OUTPATIENT)
Dept: PHARMACY | Age: 38
End: 2023-01-30
Payer: COMMERCIAL

## 2023-01-30 DIAGNOSIS — G45.9 TIA (TRANSIENT ISCHEMIC ATTACK): ICD-10-CM

## 2023-01-30 DIAGNOSIS — I21.4 NSTEMI (NON-ST ELEVATED MYOCARDIAL INFARCTION) (HCC): Primary | ICD-10-CM

## 2023-01-30 DIAGNOSIS — I63.429 CEREBROVASCULAR ACCIDENT (CVA) DUE TO EMBOLISM OF ANTERIOR CEREBRAL ARTERY, UNSPECIFIED BLOOD VESSEL LATERALITY (HCC): ICD-10-CM

## 2023-01-30 LAB
INTERNATIONAL NORMALIZATION RATIO, POC: 3
POC INR: 3 INDEX
PROTHROMBIN TIME, POC: 36.6 SECONDS (ref 10–14.3)

## 2023-01-30 PROCEDURE — 36416 COLLJ CAPILLARY BLOOD SPEC: CPT

## 2023-01-30 PROCEDURE — 85610 PROTHROMBIN TIME: CPT

## 2023-01-30 PROCEDURE — 99212 OFFICE O/P EST SF 10 MIN: CPT

## 2023-01-30 NOTE — PROGRESS NOTES
Medication Management Service  [facility]  [phone]    Visit Date: 1/30/2023   Subjective:       Abiel Acosta is a 40 y.o. male who presents to clinic today for anticoagulation monitoring and adjustment. Patient seen in clinic for warfarin management due to  Indication:   CVA, MI, and TIA. INR goal: of  2.0-2.5 . Duration of therapy: indefinite. Patient reports the following:   Adherent with regimen  Missed or extra doses:  None   Bleeding or thromboembolic side effects:  None  Significant medication changes:  None  Significant dietary changes: None  Significant alcohol or tobacco changes: None  Significant recent illness, disease state changes, or hospitalization:  None  Upcoming surgeries or procedures:  None  Falls: None           Assessment and Plan     PT/INR done in office per protocol. INR today is 3.0, supratherapeutic, for goal range        Plan: Decrease weekly dose ~5% to warfarin 6mg daily except 4mg on Mondays  Recheck INR in 2 week(s). Patient verbalized understanding of dosing directions and information discussed. Dosing schedule given to patient including phone number, appointment date, and time. Progress note sent to referring office. Patient acknowledges working in consult agreement with pharmacist as referred by his/her physician.       Electronically signed by QUETA Subramanian [facility] on 1/30/23 at 4:32 PM EST    For Pharmacy Admin Tracking Only    Intervention Detail: Dose Adjustment: 1, reason: Therapy Optimization  Total # of Interventions Recommended: 1  Total # of Interventions Accepted: 1  Time Spent (min): 15

## 2023-01-31 ENCOUNTER — TELEPHONE (OUTPATIENT)
Dept: CARDIOLOGY CLINIC | Age: 38
End: 2023-01-31

## 2023-01-31 ENCOUNTER — OFFICE VISIT (OUTPATIENT)
Dept: CARDIOLOGY CLINIC | Age: 38
End: 2023-01-31
Payer: COMMERCIAL

## 2023-01-31 VITALS
DIASTOLIC BLOOD PRESSURE: 68 MMHG | HEIGHT: 70 IN | BODY MASS INDEX: 26.71 KG/M2 | WEIGHT: 186.6 LBS | SYSTOLIC BLOOD PRESSURE: 118 MMHG | HEART RATE: 74 BPM

## 2023-01-31 DIAGNOSIS — Z79.01 ANTICOAGULATED ON COUMADIN: ICD-10-CM

## 2023-01-31 DIAGNOSIS — Z95.810 S/P ICD (INTERNAL CARDIAC DEFIBRILLATOR) PROCEDURE: ICD-10-CM

## 2023-01-31 DIAGNOSIS — I25.5 ISCHEMIC CARDIOMYOPATHY: ICD-10-CM

## 2023-01-31 DIAGNOSIS — I63.429 CEREBROVASCULAR ACCIDENT (CVA) DUE TO EMBOLISM OF ANTERIOR CEREBRAL ARTERY, UNSPECIFIED BLOOD VESSEL LATERALITY (HCC): Primary | ICD-10-CM

## 2023-01-31 PROBLEM — I21.4 NSTEMI (NON-ST ELEVATED MYOCARDIAL INFARCTION) (HCC): Status: RESOLVED | Noted: 2021-08-22 | Resolved: 2023-01-31

## 2023-01-31 PROCEDURE — 99214 OFFICE O/P EST MOD 30 MIN: CPT | Performed by: NURSE PRACTITIONER

## 2023-01-31 RX ORDER — FOLIC ACID 1 MG/1
1 TABLET ORAL DAILY
Qty: 90 TABLET | Refills: 3 | Status: SHIPPED | OUTPATIENT
Start: 2023-01-31

## 2023-01-31 RX ORDER — METOPROLOL SUCCINATE 50 MG/1
50 TABLET, EXTENDED RELEASE ORAL DAILY
Qty: 90 TABLET | Refills: 3 | Status: SHIPPED | OUTPATIENT
Start: 2023-01-31

## 2023-01-31 RX ORDER — WARFARIN SODIUM 2 MG/1
6 TABLET ORAL DAILY
Qty: 90 TABLET | Refills: 2 | Status: SHIPPED | OUTPATIENT
Start: 2023-01-31

## 2023-01-31 RX ORDER — SPIRONOLACTONE 25 MG/1
25 TABLET ORAL DAILY
Qty: 90 TABLET | Refills: 3 | Status: SHIPPED | OUTPATIENT
Start: 2023-01-31

## 2023-01-31 RX ORDER — ATORVASTATIN CALCIUM 80 MG/1
80 TABLET, FILM COATED ORAL DAILY
Qty: 90 TABLET | Refills: 3 | Status: SHIPPED | OUTPATIENT
Start: 2023-01-31

## 2023-01-31 RX ORDER — LISINOPRIL 40 MG/1
40 TABLET ORAL DAILY
Qty: 90 TABLET | Refills: 3 | Status: SHIPPED | OUTPATIENT
Start: 2023-01-31

## 2023-01-31 RX ORDER — METOPROLOL SUCCINATE 100 MG/1
100 TABLET, EXTENDED RELEASE ORAL DAILY
Qty: 90 TABLET | Refills: 3 | Status: SHIPPED | OUTPATIENT
Start: 2023-01-31

## 2023-01-31 RX ORDER — CLOPIDOGREL BISULFATE 75 MG/1
75 TABLET ORAL DAILY
Qty: 90 TABLET | Refills: 3 | Status: SHIPPED | OUTPATIENT
Start: 2023-01-31

## 2023-01-31 ASSESSMENT — ENCOUNTER SYMPTOMS
SHORTNESS OF BREATH: 0
COUGH: 0

## 2023-01-31 NOTE — LETTER
Ariadna Plunkett Last  1985  [phone]    Have you had any Chest Pain that is not new? - No  If Yes DO EKG - How does it feel -    How long does the pain last -      How long have you been having the pain -    Did you take a    And did it relieve the pain -      DO EKG IF: Patient has a Heart Rate above 100 or below 40     CAD (Coronary Artery Disease) patient should have one on file every 6 months        Have you had any Shortness of Breath - No  If Yes - When     Have you had any dizziness - No  If Yes DO ORTHOSTATIC BP - when do you feel dizzy    How long does it last .        Sitting wait 5 minutes do supine (laying down) wait 5 minutes then do standing - log each in \"vitals\" area in Epic   Be sure to ask what symptoms they are having if they get dizzy while completing ortho stats such as: room spinning, nausea, etc.    Have you had any palpitations that are not new? - No  If Yes DO EKG - Do you feel your heart   How long does it last - . Is the patient on any of the following medications -   If Yes DO EKG - Needs done every 6 months    Do you have any edema - swelling in No    If Yes - CHECK TO SEE IF THE EDEMA IS PITTING  How long have they been having edema -   If Yes - Have they worn compression stockings   If they have worn compression stockings      Vein \"LEG PROBLEM Questionnaire\"  1. Do you have prominent leg veins? No   2. Do you have any skin discoloration? No  3. Do you have any healed or active sores? No  4. Do you have swelling of the legs? No  5. Do you have a family history of varicose veins? No  6. Does your profession involve pro-longed        standing or heavy lifting? No  7. Have you been fighting overweight problems? No  8. Do you have restless legs? No  9. Do you have any night time cramps? No  10.  Do you have any of the following in your legs:        NONE     When did you have your last labs drawn   Where did you have them done   What doctor ordered     If we do not have these labs you are retrieve these labs for these providers! Do you have a surgery or procedure scheduled in the near future - No  If Yes- DO EKG  If Yes - Who is the surgery with?    Phone number of physician   Fax number of physician   Type of surgery   GIVE THIS INFORMATION TO JOSE FRANCISCO GAXIOLA     Ask patient if they want to sign up for MyChart if they are not already signed up     Check to see if we have an E-MAIL on file for the patient     Check medication list thoroughly!!! AND RECONCILE OUTSIDE MEDICATIONS  If dose has changed change the entire order not just the [place] At check out add to every patient's \"wrap up\" the following dot phrase AFTERHOURSEDUCATION and ensure we explain this to our patients

## 2023-01-31 NOTE — TELEPHONE ENCOUNTER
----- Message from RICARDO Shannon CNP sent at 1/31/2023 11:07 AM EST -----  Regarding: help with transmission  He has never set up his carelink. Can you see if [employer] can call him and get him started.    Thanks

## 2023-01-31 NOTE — TELEPHONE ENCOUNTER
Called and given [employer] Get Connected number to get started in [facility]. No questions at this time.

## 2023-01-31 NOTE — PROGRESS NOTES
CARDIOLOGY  NOTE    2023    Joy Patel (:  1985) is a 40 y.o. [facility] Thomas established patient with Dr. Madai Bejarano, here for evaluation of the following chief complaint(s):  6 Month Follow-Up        SUBJECTIVE/OBJECTIVE:    AKASH Hernandez has Past medical history as noted below. HE is feeling better. He has been able to do more of his normal activity over the last 6 months. SOB has resolved. He states that there has not been any palpiations for a while. He had some fasyter HR 2022 but reportst yohan he was at an interview. He thisnk he was anxious. Ceci Hernandez  is not sob , sometimes he gets palpitations. He is under a lot of stress due to financial restrains at home and medical bills here have piled up as well   HE says his folic acid med is more than 150 dollars   Repeat echo in 2022 showed EF of 20-25 % He had ICD placed in May 2022   He was hospitalized in Aug 2021  after recent CVA causing aphasia but is recovered from his CVA. During work-up he was also found to have severe cardiomyopathy with ejection fraction of 20% with akinetic apex and septal wall. Cardiac cath revealed occluded LAD. Viability study shows infarcted apex but viable anterior wall. Therefore we proceed with LAD intervention in Dec 2021   HE is going to se Dr Kimberly Villafuerte for PCP   He is tolerating the meds well HE is not dizzy or lightheaded . He says his breathing is okay he is tolerating meds he was started on guideline recommended medical therapy he has no ankle swelling or shortness of breath he was started on Coumadin due to concerns for possible embolic event from dilated cardiomyopathy. 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 Review of Systems   Constitutional:  Negative for fatigue and fever. Respiratory:  Negative for cough and shortness of breath. Cardiovascular:  Negative for chest pain, palpitations and leg swelling.    Musculoskeletal:  Negative for arthralgias and gait problem. Neurological:  Negative for dizziness, syncope, weakness, light-headedness and headaches. Vitals:    01/31/23 0854   BP: 118/68   Site: Left Upper Arm   Position: Sitting   Cuff Size: Medium Adult   Pulse: 74   Weight: 186 lb 9.6 oz (84.6 kg)   Height: 5' 10\" (1.778 m)       Wt Readings from Last 3 Encounters:   01/31/23 186 lb 9.6 oz (84.6 kg)   07/26/22 185 lb (83.9 kg)   06/06/22 182 lb 6.4 oz (82.7 kg)       BP Readings from Last 3 Encounters:   01/31/23 118/68   07/26/22 (!) 100/54   06/06/22 100/60       Prior to Admission medications    Medication Sig Start Date End Date Taking? Authorizing Provider   warfarin (COUMADIN) 2 MG tablet Take 3 tablets by mouth daily Or as directed 1/16/23  Yes Wayne Bashir MD   spironolactone (ALDACTONE) 25 MG tablet Take 1 tablet by mouth daily 10/24/22  Yes RICARDO Carter CNP   atorvastatin (LIPITOR) 80 MG tablet Take 1 tablet by mouth daily 10/24/22  Yes RICARDO Carter CNP   aspirin 81 MG chewable tablet Take 1 tablet by mouth daily 10/24/22  Yes RICARDO Carter CNP   metoprolol succinate (TOPROL XL) 100 MG extended release tablet Take 1 tablet by mouth in the morning. Do not crush or chew. HOLD FOR SBP LESS THAN 90 AND HR LESS THAN 60  Take 150 mg. 7/26/22  Yes Wayne Bashir MD   metoprolol succinate (TOPROL XL) 50 MG extended release tablet Take 1 tablet by mouth in the morning. Take 150 mg daily. 7/26/22  Yes Wayne Bashir MD   folic acid (FOLVITE) 1 MG tablet Take 1 tablet by mouth in the morning. 7/26/22  Yes Wayne Bashir MD   lisinopril (PRINIVIL;ZESTRIL) 40 MG tablet Take 1 tablet by mouth daily HOLD FOR SBP LESS THAN 90 AND HR LESS THAN 60 3/21/22  Yes Wayne Bashir MD   clopidogrel (PLAVIX) 75 MG tablet Take 75 mg by mouth daily 12/17/21  Yes Historical Provider, MD       Physical Exam  Vitals reviewed. Constitutional:       General: He is not in acute distress.      Appearance: Normal appearance. He is not ill-appearing. HENT:      Head: Atraumatic. Neck:      Vascular: No carotid bruit. Cardiovascular:      Rate and Rhythm: Normal rate and regular rhythm. Pulses: Normal pulses. Heart sounds: Normal heart sounds. No murmur heard. Pulmonary:      Effort: Pulmonary effort is normal. No respiratory distress. Breath sounds: Normal breath sounds. Musculoskeletal:         General: No swelling or deformity. Cervical back: Neck supple. No muscular tenderness. Neurological:      Mental Status: He is alert. Health Maintenance   Topic Date Due    Varicella vaccine (1 of 2 - 2-dose childhood series) Never done    Pneumococcal 0-64 years Vaccine (1 - PCV) Never done    Depression Screen  Never done    HIV screen  Never done    Hepatitis C screen  Never done    DTaP/Tdap/Td vaccine (1 - Tdap) Never done    COVID-19 Vaccine (3 - Booster for Moderna series) 07/28/2021    Flu vaccine (1) Never done    Lipids  12/15/2022    Hepatitis A vaccine  Aged Out    Hib vaccine  Aged Out    Meningococcal (ACWY) vaccine  Aged Out       Lab Review   Lab Results   Component Value Date/Time    CHOL 149 08/22/2021 05:56 PM    TRIG 148 08/22/2021 05:56 PM    HDL 28 08/22/2021 05:56 PM    LDLDIRECT 94 08/22/2021 05:56 PM        Cath 8/23/21  LMCA: Normal.     LAD: Abnormal.100% occluded prox LAD with Collaterals from Rt filling the LAD     LCx: Moderate Lumen Irregularity. Right dominance noted. OM1 prox 50% stenosis     RCA: Normal.Right dominance noted. Rt to Left Collaterals noted to LAD territory     Echo 8/23/21   Summary   Left ventricular systolic function is abnormal.   Ejection fraction is visually estimated at <20%. Apical, septal, and anterior wall segments are akinetic. Apical ballooning noted. Severly dilated left ventricle measuring 7.0cm in diastole. No evidence of any pericardial effusion.    Repeat study with Definity to rule out LV thrombus    Reviewed with  Marty patient case    ASSESSMENT/PLAN:  ASCVD  S/p PCI of LAD in Dec 2021 continue DAPT,   continue plavix , He is on coumadin due to concern for CVA that he had probably related to cardiomyopathy   Stop ASA      Ischemic cardiomyopathy   History of 20% wearing LifeVest continue to titrate up medication increase Toprol- mg daily and increase  lisinopril 20 mg daily. Anterior wall was  viable , He had  LAD intervention  In Dec 15 th , Echo in march 2022  shows Ef of 20-25 % / s/p ICD in May 2022  Muga scan in Feb 2022 was 26 %   H/o Acute CVA probably secondary to embolic event due to LV function possible source of embolic event continue Coumadin keep INR between 2-2.5 followed to Coumadin clinic  SP ICD placement per Dr. Ina Nascimento 5/2022    ICD placement for primary prevention  EF 26% per muga 11/2021. Interrogated today. Stable leads with 10.4 years battery  2 episodes of NSVT 1/20/2023 < 3 seconds. He was very nervious with an interview for a new job. Normal optivol levels  He does not need to take his carelink with him to [place] if he is going to be there < 3 months. Recommend sending transmission prior to leaving and when he returns. Will get carelink to call him to help with initial transmission. Cerebrovascular accident (CVA) due to embolism (HCC)  Continue Coumadin, due to apical hypokinesis      Cysticercosis  Evaluated by ID ,NO antibiotics needed   Foltx is very expensive more than 970$ , continue folic acid 1 mg daily       Dyslipidemia   All available lab work was reviewed. Patient was advised to repeat lab work before next visit. Necessary orders were placed , instructions given by myself         Counseled extensively and medication compliance urged. We discussed that for the  prevention of ASCVD our  goal is aggressive risk modification. Patient is encouraged to exercise if they can , educated about  brisk walk for 30 minutes  at least 3 to 4 times a week if there are no physical limitations  Various goals were discussed and questions answered. Continue current medications. Appropriate prescriptions are addressed and refills ordered. Questions answered and patient verbalizes understanding. Call for any problems, questions, or concerns. Greater than 60 % of time spent counseling besides reviewing data and images       Return in about 6 months (around 7/31/2023). An electronic signature was used to authenticate this note.     Electronically signed by RICARDO Vásquez CNP on 1/31/2023 at 9:17 AM

## 2023-02-01 ENCOUNTER — PROCEDURE VISIT (OUTPATIENT)
Dept: CARDIOLOGY CLINIC | Age: 38
End: 2023-02-01

## 2023-02-01 DIAGNOSIS — Z95.810 S/P ICD (INTERNAL CARDIAC DEFIBRILLATOR) PROCEDURE: Primary | ICD-10-CM

## 2023-02-13 ENCOUNTER — TELEPHONE (OUTPATIENT)
Dept: PHARMACY | Age: 38
End: 2023-02-13

## 2023-02-13 NOTE — TELEPHONE ENCOUNTER
No show to appointment. Scheduled for 2/16. For Pharmacy Admin Tracking Only    Intervention Detail:   Total # of Interventions Recommended:    Total # of Interventions Accepted:   Time Spent (min): 5

## 2023-02-16 ENCOUNTER — TELEPHONE (OUTPATIENT)
Dept: PHARMACY | Age: 38
End: 2023-02-16

## 2023-02-16 DIAGNOSIS — I63.429 CEREBROVASCULAR ACCIDENT (CVA) DUE TO EMBOLISM OF ANTERIOR CEREBRAL ARTERY, UNSPECIFIED BLOOD VESSEL LATERALITY (HCC): Primary | ICD-10-CM

## 2023-02-16 DIAGNOSIS — G45.9 TIA (TRANSIENT ISCHEMIC ATTACK): ICD-10-CM

## 2023-02-16 NOTE — TELEPHONE ENCOUNTER
No show to appointment. Called patient and scheduled for 2/23. He reports he took warfarin 6mg on Monday 2/13 instead of 4mg. Advised him to take warfarin 4mg tonight then resume maintenance dose of warfarin 6mg daily except 4mg on Mondays.    For Pharmacy Admin Tracking Only    Intervention Detail: Dose Adjustment: 1, reason: Therapy Optimization  Total # of Interventions Recommended: 1  Total # of Interventions Accepted: 1  Time Spent (min): 10

## 2023-02-23 ENCOUNTER — ANTI-COAG VISIT (OUTPATIENT)
Dept: PHARMACY | Age: 38
End: 2023-02-23
Payer: COMMERCIAL

## 2023-02-23 DIAGNOSIS — G45.9 TIA (TRANSIENT ISCHEMIC ATTACK): ICD-10-CM

## 2023-02-23 DIAGNOSIS — I63.429 CEREBROVASCULAR ACCIDENT (CVA) DUE TO EMBOLISM OF ANTERIOR CEREBRAL ARTERY, UNSPECIFIED BLOOD VESSEL LATERALITY (HCC): Primary | ICD-10-CM

## 2023-02-23 LAB
INTERNATIONAL NORMALIZATION RATIO, POC: 3.6
POC INR: 3.6 INDEX
PROTHROMBIN TIME, POC: 42.9 SECONDS (ref 10–14.3)

## 2023-02-23 PROCEDURE — 36416 COLLJ CAPILLARY BLOOD SPEC: CPT

## 2023-02-23 PROCEDURE — 99212 OFFICE O/P EST SF 10 MIN: CPT

## 2023-02-23 PROCEDURE — 85610 PROTHROMBIN TIME: CPT

## 2023-02-23 NOTE — PROGRESS NOTES
Medication Management Service  [facility]  [phone]    Visit Date: 2/23/2023   Subjective:       Marilu Contreras is a 40 y.o. male who presents to clinic today for anticoagulation monitoring and adjustment. Patient seen in clinic for warfarin management due to  Indication:   CVA and TIA. INR goal: of  2.0-2.5 . Duration of therapy: indefinite. Patient reports the following:   Adherent with regimen  Missed or extra doses:  - updated anticoag calendar changes from phone call- he took 6mg on a Monday instead of 4mg  Bleeding or thromboembolic side effects:  None  Significant medication changes:  None  Significant dietary changes: None  Significant alcohol or tobacco changes: None  Significant recent illness, disease state changes, or hospitalization:  None  Upcoming surgeries or procedures:  None  Falls: None           Assessment and Plan     PT/INR done in office per protocol. INR today is 3.6, supratherapeutic. Plan: Hold warfarin today then decrease weekly dose 5% to warfarin 6mg daily except 4mg on Mondays and Thursdays     Recheck INR in 2 week(s). Patient verbalized understanding of dosing directions and information discussed. Dosing schedule given to patient including phone number, appointment date, and time. Progress note sent to referring office. Patient acknowledges working in consult agreement with pharmacist as referred by his/her physician.       Electronically signed by QUETA Hankins [facility] on 2/23/23 at 5:21 PM EST    For Pharmacy Admin Tracking Only    Intervention Detail: Dose Adjustment: 1, reason: Therapy Optimization  Total # of Interventions Recommended: 1  Total # of Interventions Accepted: 1  Time Spent (min): 15

## 2023-03-09 ENCOUNTER — ANTI-COAG VISIT (OUTPATIENT)
Dept: PHARMACY | Age: 38
End: 2023-03-09
Payer: COMMERCIAL

## 2023-03-09 DIAGNOSIS — I63.429 CEREBROVASCULAR ACCIDENT (CVA) DUE TO EMBOLISM OF ANTERIOR CEREBRAL ARTERY, UNSPECIFIED BLOOD VESSEL LATERALITY (HCC): Primary | ICD-10-CM

## 2023-03-09 DIAGNOSIS — G45.9 TIA (TRANSIENT ISCHEMIC ATTACK): ICD-10-CM

## 2023-03-09 LAB
INTERNATIONAL NORMALIZATION RATIO, POC: 3
POC INR: 3 INDEX
PROTHROMBIN TIME, POC: 35.7 SECONDS (ref 10–14.3)

## 2023-03-09 PROCEDURE — 36416 COLLJ CAPILLARY BLOOD SPEC: CPT

## 2023-03-09 PROCEDURE — 85610 PROTHROMBIN TIME: CPT

## 2023-03-09 PROCEDURE — 99212 OFFICE O/P EST SF 10 MIN: CPT

## 2023-03-09 NOTE — PROGRESS NOTES
Medication Management Service  [facility]  [phone]    Visit Date: 3/9/2023   Subjective:       Gabe Ortiz is a 40 y.o. male who presents to clinic today for anticoagulation monitoring and adjustment. Patient seen in clinic for warfarin management due to  Indication:   CVA and TIA. INR goal: of  2.0-2.5 . Duration of therapy: indefinite. Patient reports the following:   Adherent with regimen  Missed or extra doses:  None   Bleeding or thromboembolic side effects:  None  Significant medication changes:  None  Significant dietary changes: None  Significant alcohol or tobacco changes: None  Significant recent illness, disease state changes, or hospitalization:  None  Upcoming surgeries or procedures:  None  Falls: None           Assessment and Plan     PT/INR done in office per protocol. INR today is 3.0, supratherapeutic, for his goal.         Plan:  Take warfarin 4mg today then decrease weekly dose ~5% to warfarin 6mg daily except 4mg on MWF     Recheck INR in 2 week(s). Patient verbalized understanding of dosing directions and information discussed. Dosing schedule given to patient including phone number, appointment date, and time. Progress note sent to referring office. Patient acknowledges working in consult agreement with pharmacist as referred by his/her physician.       Electronically signed by Nika Lange [facility] on 3/9/23 at 3:15 PM EST    For Pharmacy Admin Tracking Only    Intervention Detail: Dose Adjustment: 1, reason: Therapy Optimization  Total # of Interventions Recommended: 1  Total # of Interventions Accepted: 1  Time Spent (min): 15

## 2023-03-13 ENCOUNTER — TELEPHONE (OUTPATIENT)
Dept: PHARMACY | Age: 38
End: 2023-03-13

## 2023-03-13 DIAGNOSIS — I63.429 CEREBROVASCULAR ACCIDENT (CVA) DUE TO EMBOLISM OF ANTERIOR CEREBRAL ARTERY, UNSPECIFIED BLOOD VESSEL LATERALITY (HCC): Primary | ICD-10-CM

## 2023-03-13 DIAGNOSIS — G45.9 TIA (TRANSIENT ISCHEMIC ATTACK): ICD-10-CM

## 2023-03-13 RX ORDER — WARFARIN SODIUM 5 MG/1
5 TABLET ORAL DAILY
Qty: 30 TABLET | Refills: 1 | Status: SHIPPED | OUTPATIENT
Start: 2023-03-13

## 2023-03-13 NOTE — TELEPHONE ENCOUNTER
Patient called and needs a refill. Last visit we had discussed changing to 5mg, but he thought he might have a large supply of 2mg tablets at home. Will start warfarin 5mg daily, starting tomorrow 3/14/23. Erx sent to [address]      For Pharmacy Admin Tracking Only    Intervention Detail: Dose Adjustment: 1, reason: Therapy Optimization and Refill(s) Provided  Total # of Interventions Recommended: 1  Total # of Interventions Accepted: 1  Time Spent (min): 15

## 2023-03-23 ENCOUNTER — ANTI-COAG VISIT (OUTPATIENT)
Dept: PHARMACY | Age: 38
End: 2023-03-23
Payer: COMMERCIAL

## 2023-03-23 DIAGNOSIS — G45.9 TIA (TRANSIENT ISCHEMIC ATTACK): ICD-10-CM

## 2023-03-23 DIAGNOSIS — I63.429 CEREBROVASCULAR ACCIDENT (CVA) DUE TO EMBOLISM OF ANTERIOR CEREBRAL ARTERY, UNSPECIFIED BLOOD VESSEL LATERALITY (HCC): Primary | ICD-10-CM

## 2023-03-23 LAB
INTERNATIONAL NORMALIZATION RATIO, POC: 2.3
POC INR: 2.3 INDEX
PROTHROMBIN TIME, POC: 27.3 SECONDS (ref 10–14.3)

## 2023-03-23 PROCEDURE — 99211 OFF/OP EST MAY X REQ PHY/QHP: CPT

## 2023-03-23 PROCEDURE — 36416 COLLJ CAPILLARY BLOOD SPEC: CPT

## 2023-03-23 PROCEDURE — 85610 PROTHROMBIN TIME: CPT

## 2023-03-23 NOTE — PROGRESS NOTES
Medication Management Service  [facility]  [phone]    Visit Date: 3/23/2023   Subjective:       Brien Monroe is a 40 y.o. male who presents to clinic today for anticoagulation monitoring and adjustment. Patient seen in clinic for warfarin management due to  Indication:   CVA and TIA. INR goal: of  2.0-2.5 . Duration of therapy: indefinite. Patient reports the following:   Adherent with regimen  Missed or extra doses:  None   Bleeding or thromboembolic side effects:  None  Significant medication changes:  None  Significant dietary changes: None  Significant alcohol or tobacco changes: None  Significant recent illness, disease state changes, or hospitalization:  None  Upcoming surgeries or procedures:  None  Falls: None           Assessment and Plan     PT/INR done in office per protocol. INR today is 2.3, therapeutic. Plan: Will continue current regimen of warfarin 5mg daily. Recheck INR in 2.5 week(s). Patient verbalized understanding of dosing directions and information discussed. Dosing schedule given to patient including phone number, appointment date, and time. Progress note sent to referring office. Patient acknowledges working in consult agreement with pharmacist as referred by his/her physician. Electronically signed by Rogerio Oh, [address] [place] [address] on 3/23/23 at 5:18 PM EDT    For Pharmacy Admin Tracking Only    Intervention Detail:   Total # of Interventions Recommended:    Total # of Interventions Accepted:   Time Spent (min): 15

## 2023-04-17 ENCOUNTER — TELEPHONE (OUTPATIENT)
Dept: PHARMACY | Age: 38
End: 2023-04-17

## 2023-04-17 NOTE — TELEPHONE ENCOUNTER
Patient called to see if he can come at 6:15pm today. Scheduled for 4/20 at 5:00pm.     For Pharmacy Admin Tracking Only    Intervention Detail:   Total # of Interventions Recommended:    Total # of Interventions Accepted:   Time Spent (min): 5

## 2023-04-20 ENCOUNTER — ANTI-COAG VISIT (OUTPATIENT)
Dept: PHARMACY | Age: 38
End: 2023-04-20
Payer: COMMERCIAL

## 2023-04-20 DIAGNOSIS — G45.9 TIA (TRANSIENT ISCHEMIC ATTACK): ICD-10-CM

## 2023-04-20 DIAGNOSIS — I63.429 CEREBROVASCULAR ACCIDENT (CVA) DUE TO EMBOLISM OF ANTERIOR CEREBRAL ARTERY, UNSPECIFIED BLOOD VESSEL LATERALITY (HCC): Primary | ICD-10-CM

## 2023-04-20 LAB
INTERNATIONAL NORMALIZATION RATIO, POC: 2.1
POC INR: 2.1 INDEX
PROTHROMBIN TIME, POC: 25.3 SECONDS (ref 10–14.3)

## 2023-04-20 PROCEDURE — 85610 PROTHROMBIN TIME: CPT

## 2023-04-20 PROCEDURE — 99212 OFFICE O/P EST SF 10 MIN: CPT

## 2023-04-20 PROCEDURE — 36416 COLLJ CAPILLARY BLOOD SPEC: CPT

## 2023-04-20 RX ORDER — WARFARIN SODIUM 5 MG/1
5 TABLET ORAL DAILY
Qty: 30 TABLET | Refills: 2 | Status: SHIPPED | OUTPATIENT
Start: 2023-04-20

## 2023-04-20 NOTE — PROGRESS NOTES
Medication Management Service  [facility]  [phone]    Visit Date: 4/20/2023   Subjective:       Bryce Recio is a 40 y.o. male who presents to clinic today for anticoagulation monitoring and adjustment. Patient seen in clinic for warfarin management due to  Indication:   CVA and TIA. INR goal: of  2.0-2.5 . Duration of therapy: indefinite. Patient reports the following:   Adherent with regimen  Missed or extra doses:  None   Bleeding or thromboembolic side effects:  None  Significant medication changes:  None  Significant dietary changes: None  Significant alcohol or tobacco changes: None  Significant recent illness, disease state changes, or hospitalization:  None  Upcoming surgeries or procedures:  None  Falls: None           Assessment and Plan     PT/INR done in office per protocol. INR today is 2.1, therapeutic. Refill needed  Plan: Will continue current regimen of warfarin 5mg daily. ERx sent to [facility] INR in 4 week(s). Patient verbalized understanding of dosing directions and information discussed. Dosing schedule given to patient including phone number, appointment date, and time. Progress note sent to referring office. Patient acknowledges working in consult agreement with pharmacist as referred by his/her physician.       Electronically signed by Cayla Santos [facility] on 4/20/23 at 5:03 PM EDT    For Pharmacy Admin Tracking Only    Intervention Detail: Refill(s) Provided  Total # of Interventions Recommended: 1  Total # of Interventions Accepted: 1  Time Spent (min): 15

## 2023-04-23 NOTE — LETTER
Edwin Garcia  1985  [id number]    Have you had any Chest Pain that is not new? - No          Have you had any Shortness of Breath - Yes  If Yes - When on exertion    Have you had any dizziness - No    Have you had any palpitations that are not new? - No    Is the patient on any of the following medications -   If Yes DO EKG - Needs done every 6 months    Do you have any edema - swelling in No    If Yes - CHECK TO SEE IF THE EDEMA IS PITTING      When did you have your last labs drawn   Where did you have them done   What doctor ordered     If we do not have these labs you are retrieve these labs for these providers!     Do you have a surgery or procedure scheduled in the near future - NO     Ask patient if [facility] if they are not already signed [facility]     Check to see if we have an E-MAIL on file for the patient     Check medication list thoroughly!!! AND RECONCILE OUTSIDE MEDICATIONS  If dose has changed change the entire order not just the MG  BE SURE TO ASK PATIENT IF THEY NEED MEDICATION REFILLS     At check out add to every patient's \"wrap up\" the following dot phrase AFTERHOURSEDUCATION and ensure we explain this to our patients 23-Apr-2023

## 2023-05-09 ENCOUNTER — PROCEDURE VISIT (OUTPATIENT)
Dept: CARDIOLOGY CLINIC | Age: 38
End: 2023-05-09

## 2023-05-09 DIAGNOSIS — Z95.810 ICD (IMPLANTABLE CARDIOVERTER-DEFIBRILLATOR), DUAL, IN SITU: Primary | ICD-10-CM

## 2023-05-09 DIAGNOSIS — Z95.810 S/P ICD (INTERNAL CARDIAC DEFIBRILLATOR) PROCEDURE: ICD-10-CM

## 2023-05-18 ENCOUNTER — ANTI-COAG VISIT (OUTPATIENT)
Dept: PHARMACY | Age: 38
End: 2023-05-18
Payer: COMMERCIAL

## 2023-05-18 DIAGNOSIS — I63.429 CEREBROVASCULAR ACCIDENT (CVA) DUE TO EMBOLISM OF ANTERIOR CEREBRAL ARTERY, UNSPECIFIED BLOOD VESSEL LATERALITY (HCC): Primary | ICD-10-CM

## 2023-05-18 DIAGNOSIS — G45.9 TIA (TRANSIENT ISCHEMIC ATTACK): ICD-10-CM

## 2023-05-18 LAB
INTERNATIONAL NORMALIZATION RATIO, POC: 2.2
POC INR: 2.2 INDEX
PROTHROMBIN TIME, POC: 25.9 SECONDS (ref 10–14.3)

## 2023-05-18 PROCEDURE — 36416 COLLJ CAPILLARY BLOOD SPEC: CPT

## 2023-05-18 PROCEDURE — 85610 PROTHROMBIN TIME: CPT

## 2023-05-18 PROCEDURE — 99211 OFF/OP EST MAY X REQ PHY/QHP: CPT

## 2023-07-06 DIAGNOSIS — I63.429 CEREBROVASCULAR ACCIDENT (CVA) DUE TO EMBOLISM OF ANTERIOR CEREBRAL ARTERY, UNSPECIFIED BLOOD VESSEL LATERALITY (HCC): Primary | ICD-10-CM

## 2023-07-06 RX ORDER — WARFARIN SODIUM 5 MG/1
TABLET ORAL
Qty: 30 TABLET | Refills: 2 | Status: SHIPPED | OUTPATIENT
Start: 2023-07-06

## 2023-07-06 NOTE — TELEPHONE ENCOUNTER
Patient called requesting the procedure code for his upcoming procedure, please call him back at ph# [phone].
Quality 226: Preventive Care And Screening: Tobacco Use: Screening And Cessation Intervention: Patient screened for tobacco use and is an ex/non-smoker
Quality 138: Melanoma: Coordination Of Care: A treatment plan was communicated to the physicians providing continuing care within one month of diagnosis outlining: diagnosis, tumor thickness and a plan for surgery or alternate care.
Detail Level: Detailed
Quality 137: Melanoma: Continuity Of Care - Recall System: Patient information entered into a recall system that includes: target date for the next exam specified AND a process to follow up with patients regarding missed or unscheduled appointments

## 2023-07-11 DIAGNOSIS — I63.429 CEREBROVASCULAR ACCIDENT (CVA) DUE TO EMBOLISM OF ANTERIOR CEREBRAL ARTERY, UNSPECIFIED BLOOD VESSEL LATERALITY (HCC): Primary | ICD-10-CM

## 2023-07-13 ENCOUNTER — TELEPHONE (OUTPATIENT)
Dept: PHARMACY | Age: 38
End: 2023-07-13

## 2023-07-13 NOTE — TELEPHONE ENCOUNTER
Rescheduled for 7/20. He will call 7/17 if he can come in on 7/17. For Pharmacy Admin Tracking Only    Intervention Detail:   Total # of Interventions Recommended:    Total # of Interventions Accepted:   Time Spent (min): 5

## 2023-07-20 ENCOUNTER — ANTI-COAG VISIT (OUTPATIENT)
Dept: PHARMACY | Age: 38
End: 2023-07-20
Payer: COMMERCIAL

## 2023-07-20 ENCOUNTER — TELEPHONE (OUTPATIENT)
Dept: PHARMACY | Age: 38
End: 2023-07-20

## 2023-07-20 DIAGNOSIS — I63.429 CEREBROVASCULAR ACCIDENT (CVA) DUE TO EMBOLISM OF ANTERIOR CEREBRAL ARTERY, UNSPECIFIED BLOOD VESSEL LATERALITY (HCC): Primary | ICD-10-CM

## 2023-07-20 DIAGNOSIS — G45.9 TIA (TRANSIENT ISCHEMIC ATTACK): ICD-10-CM

## 2023-07-20 PROCEDURE — 36416 COLLJ CAPILLARY BLOOD SPEC: CPT

## 2023-07-20 PROCEDURE — 85610 PROTHROMBIN TIME: CPT

## 2023-07-20 PROCEDURE — 99211 OFF/OP EST MAY X REQ PHY/QHP: CPT

## 2023-07-20 NOTE — PROGRESS NOTES
Medication Management Service  [facility]  [phone]    Visit Date: 7/20/2023   Subjective:       Travis Dong is a 40 y.o. male who presents to clinic today for anticoagulation monitoring and adjustment. Patient seen in clinic for warfarin management due to  Indication:   CVA and TIA. INR goal: of  2.0-2.5 . Duration of therapy: indefinite. Patient reports the following:   Adherent with regimen  Missed or extra doses:  None   Bleeding or thromboembolic side effects:  None  Significant medication changes:  None  Significant dietary changes: None  Significant alcohol or tobacco changes: None  Significant recent illness, disease state changes, or hospitalization:  None  Upcoming surgeries or procedures:  None  Falls: None           Assessment and Plan     PT/INR done in office per protocol. INR today is 2.7, therapeutic, just above goal range. Plan: Will continue current regimen of warfarin 5mg daily. Recheck INR in 4.5 week(s). Patient verbalized understanding of dosing directions and information discussed. Dosing schedule given to patient including phone number, appointment date, and time. Progress note sent to referring office. Electronically signed by QUETA [facility] on 7/20/23     For Pharmacy Admin Tracking Only    Intervention Detail:   Total # of Interventions Recommended:    Total # of Interventions Accepted:   Time Spent (min): 15

## 2023-07-20 NOTE — TELEPHONE ENCOUNTER
Patient called to check on other open times for today. Current openings did not work for his schedule. He will call back or I will call if something opens. For Pharmacy Admin Tracking Only    Intervention Detail:   Total # of Interventions Recommended:    Total # of Interventions Accepted:   Time Spent (min): 5

## 2023-07-31 ENCOUNTER — OFFICE VISIT (OUTPATIENT)
Dept: CARDIOLOGY CLINIC | Age: 38
End: 2023-07-31
Payer: COMMERCIAL

## 2023-07-31 VITALS
HEART RATE: 87 BPM | HEIGHT: 70 IN | OXYGEN SATURATION: 99 % | BODY MASS INDEX: 26.88 KG/M2 | DIASTOLIC BLOOD PRESSURE: 82 MMHG | WEIGHT: 187.8 LBS | SYSTOLIC BLOOD PRESSURE: 118 MMHG

## 2023-07-31 DIAGNOSIS — G45.9 TIA (TRANSIENT ISCHEMIC ATTACK): ICD-10-CM

## 2023-07-31 DIAGNOSIS — I25.5 ISCHEMIC CARDIOMYOPATHY: Primary | ICD-10-CM

## 2023-07-31 DIAGNOSIS — I63.429 CEREBROVASCULAR ACCIDENT (CVA) DUE TO EMBOLISM OF ANTERIOR CEREBRAL ARTERY, UNSPECIFIED BLOOD VESSEL LATERALITY (HCC): ICD-10-CM

## 2023-07-31 DIAGNOSIS — Z95.810 S/P ICD (INTERNAL CARDIAC DEFIBRILLATOR) PROCEDURE: ICD-10-CM

## 2023-07-31 DIAGNOSIS — Z79.01 ANTICOAGULATED ON COUMADIN: ICD-10-CM

## 2023-07-31 PROCEDURE — 99214 OFFICE O/P EST MOD 30 MIN: CPT | Performed by: INTERNAL MEDICINE

## 2023-07-31 RX ORDER — LISINOPRIL 20 MG/1
20 TABLET ORAL DAILY
Qty: 90 TABLET | Refills: 2 | Status: SHIPPED | OUTPATIENT
Start: 2023-07-31

## 2023-07-31 NOTE — PROGRESS NOTES
Ischemic cardiomyopathy   History of 20% wearing LifeVest continue to titrate up medication advised to restart Toprol- mg daily and increase  lisinopril 20 mg daily. Anterior wall was  viable , He had  LAD intervention  In Dec 15 th , Echo in march 2022  shows Ef of 20-25 % / s/p ICD in May 2022  Muga scan in Feb 2022 was 26 %   H/o Acute CVA probably secondary to embolic event due to LV function possible source of embolic event continue Coumadin keep INR between 2-2.5 followed to Coumadin clinic    Cerebrovascular accident (CVA) due to embolism ([address])  Continue Coumadin , due to apical hypokinesis I will repeat echo since he is s/p BiV ICD now although he is not paced if EF is improved maybe we can take him off Coumadin? Cysticercosis  Evaluated by ID ,NO antibiotics needed   Foltx is very expensive more than 628$ , change to  folic acid 1 mg daily      Dyslipidemia :  All available lab work was reviewed. Patient was advised to repeat lab work before next visit. Necessary orders were placed , instructions given by myself       Counseled extensively and medication compliance urged. We discussed that for the  prevention of ASCVD our  goal is aggressive risk modification. Patient is encouraged to exercise if they can , educated about  brisk walk for 30 minutes  at least 3 to 4 times a week if there are no physical limitations  Various goals were discussed and questions answered. Continue current medications. Appropriate prescriptions are addressed and refills ordered. Questions answered and patient verbalizes understanding. Call for any problems, questions, or concerns. Greater than 60 % of time spent counseling besides reviewing data and images     Continue all other medications of all above medical condition listed as is. Return in about 6 months (around 1/31/2024).     Please note this report has been partially produced using speech recognition software and may contain errors related to that

## 2023-07-31 NOTE — PATIENT INSTRUCTIONS
[facility] Locations - No appointment necessary. Sites open Monday to Friday. Call your preferred location for test preparation, business   hours and other information you need. [employer]. [address] [facility]. [address] [place], [address] [facility]  Phone: [phone]     **It is YOUR responsibilty to bring medication bottles and/or updated medication list to [facility]. This will allow us to better serve you and all your healthcare needs**    Please be informed that if you contact our office outside of normal business hours the physician on call cannot help with any scheduling or rescheduling issues, procedure instruction questions or any type of medication issue. We advise you for any urgent/emergency that you go to the nearest emergency room! PLEASE CALL OUR OFFICE DURING NORMAL BUSINESS HOURS    Monday - Friday   8 am to 5 pm    [place]: [address] [place]: [phone]    [place]:  [phone]    We are committed to providing you the best care possible. If you receive a survey after visiting one of our offices, please take time to share your experience concerning your physician office visit. These surveys are confidential and no health information about you is shared. We are eager to improve for you and we are counting on your feedback to help make that happen. Thank you for allowing us to care for you today! We want to ensure we can follow your treatment plan and we strive to give you the best outcomes and experience possible. If you ever have a life threatening emergency and call 911 - for an ambulance (EMS)   Our providers can only care for you at:   [facility] or [facility]. Even if you have someone take you or you drive yourself we can only care for you in a [facility] facility. Our providers are not setup at the other healthcare locations!

## 2023-08-16 ENCOUNTER — PROCEDURE VISIT (OUTPATIENT)
Dept: CARDIOLOGY CLINIC | Age: 38
End: 2023-08-16

## 2023-08-16 DIAGNOSIS — Z95.810 ICD (IMPLANTABLE CARDIOVERTER-DEFIBRILLATOR), DUAL, IN SITU: Primary | ICD-10-CM

## 2023-08-16 DIAGNOSIS — Z95.810 S/P ICD (INTERNAL CARDIAC DEFIBRILLATOR) PROCEDURE: ICD-10-CM

## 2023-08-21 ENCOUNTER — ANTI-COAG VISIT (OUTPATIENT)
Dept: PHARMACY | Age: 38
End: 2023-08-21
Payer: COMMERCIAL

## 2023-08-21 DIAGNOSIS — G45.9 TIA (TRANSIENT ISCHEMIC ATTACK): ICD-10-CM

## 2023-08-21 DIAGNOSIS — I63.429 CEREBROVASCULAR ACCIDENT (CVA) DUE TO EMBOLISM OF ANTERIOR CEREBRAL ARTERY, UNSPECIFIED BLOOD VESSEL LATERALITY (HCC): Primary | ICD-10-CM

## 2023-08-21 LAB
INTERNATIONAL NORMALIZATION RATIO, POC: 2.1
POC INR: 2.1 INDEX
PROTHROMBIN TIME, POC: 24.9 SECONDS (ref 10–14.3)

## 2023-08-21 PROCEDURE — 85610 PROTHROMBIN TIME: CPT

## 2023-08-21 PROCEDURE — 36416 COLLJ CAPILLARY BLOOD SPEC: CPT

## 2023-08-21 PROCEDURE — 99211 OFF/OP EST MAY X REQ PHY/QHP: CPT

## 2023-08-21 NOTE — PROGRESS NOTES
Medication Management Service  [facility]  [phone]    Visit Date: 8/21/2023   Subjective:       Aleksey Arredondo is a 40 y.o. male who presents to clinic today for anticoagulation monitoring and adjustment. Patient seen in clinic for warfarin management due to  Indication:   CVA and TIA. INR goal: of  2.0-2.5 . Duration of therapy: indefinite. Patient reports the following:   Adherent with regimen  Missed or extra doses:  None   Bleeding or thromboembolic side effects:  None  Significant medication changes:  None  Significant dietary changes: None  Significant alcohol or tobacco changes: None  Significant recent illness, disease state changes, or hospitalization:  None  Upcoming surgeries or procedures:  None  Falls: None           Assessment and Plan     PT/INR done in office per protocol. INR today is 2.1, therapeutic. Plan: Will continue current regimen of warfarin 5mg daily. Recheck INR in 4.5 week(s). Patient verbalized understanding of dosing directions and information discussed. Dosing schedule given to patient including phone number, appointment date, and time. Progress note sent to referring office. Electronically signed by QUETA [facility] on 8/21/23     For Pharmacy Admin Tracking Only    Intervention Detail:   Total # of Interventions Recommended:    Total # of Interventions Accepted:   Time Spent (min): 15

## 2023-08-31 ENCOUNTER — PROCEDURE VISIT (OUTPATIENT)
Dept: CARDIOLOGY CLINIC | Age: 38
End: 2023-08-31
Payer: COMMERCIAL

## 2023-08-31 DIAGNOSIS — I25.5 ISCHEMIC CARDIOMYOPATHY: ICD-10-CM

## 2023-08-31 LAB
LV EF: 23 %
LVEF MODALITY: NORMAL

## 2023-08-31 PROCEDURE — 93306 TTE W/DOPPLER COMPLETE: CPT | Performed by: INTERNAL MEDICINE

## 2023-09-05 ENCOUNTER — TELEPHONE (OUTPATIENT)
Dept: CARDIOLOGY CLINIC | Age: 38
End: 2023-09-05

## 2023-09-05 NOTE — TELEPHONE ENCOUNTER
Summary   Dilated left ventricle with extensive regional wall motion abnormalities and   severe systolic impairment. Ejection fraction is visually estimated at 20-25%. Grade II diastolic dysfunction. No significant valvular abnormalities. Mild mitral and tricuspid regurgitation is present. RVSP is 24 mmHg. No evidence of pericardial effusion. When this echo is compared with the echo from 5/2022, no significant   interval changes have occurred. Spoke to pt regarding results and follow up appt. Patient advised and voices understanding.

## 2023-09-21 ENCOUNTER — ANTI-COAG VISIT (OUTPATIENT)
Dept: PHARMACY | Age: 38
End: 2023-09-21
Payer: COMMERCIAL

## 2023-09-21 DIAGNOSIS — I63.429 CEREBROVASCULAR ACCIDENT (CVA) DUE TO EMBOLISM OF ANTERIOR CEREBRAL ARTERY, UNSPECIFIED BLOOD VESSEL LATERALITY (HCC): Primary | ICD-10-CM

## 2023-09-21 DIAGNOSIS — G45.9 TIA (TRANSIENT ISCHEMIC ATTACK): ICD-10-CM

## 2023-09-21 LAB
INTERNATIONAL NORMALIZATION RATIO, POC: 2
POC INR: 2 INDEX
PROTHROMBIN TIME, POC: 23.6 SECONDS (ref 10–14.3)

## 2023-09-21 PROCEDURE — 36416 COLLJ CAPILLARY BLOOD SPEC: CPT

## 2023-09-21 PROCEDURE — 85610 PROTHROMBIN TIME: CPT

## 2023-09-21 PROCEDURE — 99212 OFFICE O/P EST SF 10 MIN: CPT

## 2023-09-21 RX ORDER — WARFARIN SODIUM 5 MG/1
5 TABLET ORAL DAILY
Qty: 90 TABLET | Refills: 1 | Status: SHIPPED | OUTPATIENT
Start: 2023-09-21

## 2023-09-21 NOTE — PROGRESS NOTES
Medication Management Service  [facility]  [phone]    Visit Date: 9/21/2023   Subjective:       Philippe Altamirano is a 40 y.o. male who presents to clinic today for anticoagulation monitoring and adjustment. Patient seen in clinic for warfarin management due to  Indication:   CVA and TIA. INR goal: of  2.0-2.5 . Duration of therapy: indefinite. Patient reports the following:   Adherent with regimen  Missed or extra doses:  None   Bleeding or thromboembolic side effects:  None  Significant medication changes:  None  Significant dietary changes: None  Significant alcohol or tobacco changes: None  Significant recent illness, disease state changes, or hospitalization:  None  Upcoming surgeries or procedures:  None  Falls: None           Assessment and Plan     PT/INR done in office per protocol. INR today is 2.0, therapeutic. Refill needed  Plan: Will continue current regimen of warfarin 5mg daily. Erx sent to [address] [place] [address] INR in 4 week(s). Patient verbalized understanding of dosing directions and information discussed. Dosing schedule given to patient including phone number, appointment date, and time. Progress note sent to referring office.     Electronically signed by Bebo Davies, [address] [place] [address] on 9/21/23     For Pharmacy Admin Tracking Only    Intervention Detail: Refill(s) Provided  Total # of Interventions Recommended: 1  Total # of Interventions Accepted: 1  Time Spent (min): 15

## 2023-10-18 NOTE — PROGRESS NOTES
Medication Management Service  [facility]  [phone]    Visit Date: 10/19/2023   Subjective:       Sarah Huggins is a 40 y.o. male who presents to clinic today for anticoagulation monitoring and adjustment. Patient seen in clinic for warfarin management due to  Indication:   CVA and TIA. INR goal: of  2.0 - 2.5 . Duration of therapy: indefinite. Patient reports the following:   Adherent with regimen  Missed or extra doses:  None   Bleeding or thromboembolic side effects:  None  Significant medication changes:  None  Significant dietary changes: Possible decreased vitamin K intake during the previous weeks  Significant alcohol or tobacco changes: None  Significant recent illness, disease state changes, or hospitalization:  None  Upcoming surgeries or procedures:  None  Falls: None           Assessment and Plan     PT/INR done in office per protocol. INR today is 3.3, supratherapeutic. Patient advised of increased risk of bleeding at current INR. Advised patient to avoid activities that increase risk of falling or cutting him/herself. Advised patient to go to ER for fall, head injury, or bleeding. Patient voiced understanding. Plan:  Take warfarin 2.5mg x 1 then will continue current regimen of warfarin 5mg daily. Recheck INR in 2 week(s). Patient verbalized understanding of dosing directions and information discussed. Dosing schedule given to patient including phone number, appointment date, and time. Progress note sent to referring office.     Electronically signed by QUETA [facility] on 10/19/23     For Pharmacy Admin Tracking Only    Intervention Detail: Dose Adjustment: 1, reason: Therapy De-escalation  Total # of Interventions Recommended: 1  Total # of Interventions Accepted: 1  Time Spent (min): 15

## 2023-10-19 ENCOUNTER — ANTI-COAG VISIT (OUTPATIENT)
Dept: PHARMACY | Age: 38
End: 2023-10-19
Payer: COMMERCIAL

## 2023-10-19 DIAGNOSIS — I63.429 CEREBROVASCULAR ACCIDENT (CVA) DUE TO EMBOLISM OF ANTERIOR CEREBRAL ARTERY, UNSPECIFIED BLOOD VESSEL LATERALITY (HCC): Primary | ICD-10-CM

## 2023-10-19 DIAGNOSIS — G45.9 TIA (TRANSIENT ISCHEMIC ATTACK): ICD-10-CM

## 2023-10-19 LAB
INR BLD: 3.3
POC INR: 3.3 INDEX
PROTHROMBIN TIME, POC: 40.1 SECONDS (ref 10–14.3)
PROTIME: 40.1 SECONDS

## 2023-10-19 PROCEDURE — 36416 COLLJ CAPILLARY BLOOD SPEC: CPT

## 2023-10-19 PROCEDURE — 99212 OFFICE O/P EST SF 10 MIN: CPT

## 2023-10-19 PROCEDURE — 85610 PROTHROMBIN TIME: CPT

## 2023-11-02 ENCOUNTER — ANTI-COAG VISIT (OUTPATIENT)
Dept: PHARMACY | Age: 38
End: 2023-11-02
Payer: COMMERCIAL

## 2023-11-02 DIAGNOSIS — G45.9 TIA (TRANSIENT ISCHEMIC ATTACK): ICD-10-CM

## 2023-11-02 DIAGNOSIS — I63.429 CEREBROVASCULAR ACCIDENT (CVA) DUE TO EMBOLISM OF ANTERIOR CEREBRAL ARTERY, UNSPECIFIED BLOOD VESSEL LATERALITY (HCC): Primary | ICD-10-CM

## 2023-11-02 LAB
INTERNATIONAL NORMALIZATION RATIO, POC: 2
POC INR: 2 INDEX
PROTHROMBIN TIME, POC: 23.8 SECONDS (ref 10–14.3)

## 2023-11-02 PROCEDURE — 36416 COLLJ CAPILLARY BLOOD SPEC: CPT

## 2023-11-02 PROCEDURE — 85610 PROTHROMBIN TIME: CPT

## 2023-11-02 PROCEDURE — 99211 OFF/OP EST MAY X REQ PHY/QHP: CPT

## 2023-11-02 NOTE — PROGRESS NOTES
Medication Management Service  [facility]  [phone]    Visit Date: 11/2/2023   Subjective:       Jose Pryor is a 40 y.o. male who presents to clinic today for anticoagulation monitoring and adjustment. Patient seen in clinic for warfarin management due to  Indication:   CVA and TIA. INR goal: of  2.0-2.5 . Duration of therapy: indefinite. Patient reports the following:   Adherent with regimen  Missed or extra doses:  None   Bleeding or thromboembolic side effects:  None  Significant medication changes:  None  Significant dietary changes: None  Significant alcohol or tobacco changes: None  Significant recent illness, disease state changes, or hospitalization:  None  Upcoming surgeries or procedures:  None  Falls: None           Assessment and Plan     PT/INR done in office per protocol. INR today is 2.0, therapeutic. Plan: Will continue current regimen of warfarin 5mg daily. Recheck INR in 5 week(s). Patient verbalized understanding of dosing directions and information discussed. Dosing schedule given to patient including phone number, appointment date, and time. Progress note sent to referring office. Electronically signed by QUETA [facility] on 11/2/23     For Pharmacy Admin Tracking Only    Intervention Detail:   Total # of Interventions Recommended:    Total # of Interventions Accepted:   Time Spent (min): 15

## 2023-12-01 ENCOUNTER — PROCEDURE VISIT (OUTPATIENT)
Dept: CARDIOLOGY CLINIC | Age: 38
End: 2023-12-01

## 2023-12-01 DIAGNOSIS — Z95.810 ICD (IMPLANTABLE CARDIOVERTER-DEFIBRILLATOR), DUAL, IN SITU: Primary | ICD-10-CM

## 2023-12-01 DIAGNOSIS — Z95.810 S/P ICD (INTERNAL CARDIAC DEFIBRILLATOR) PROCEDURE: ICD-10-CM

## 2023-12-07 ENCOUNTER — ANTI-COAG VISIT (OUTPATIENT)
Dept: PHARMACY | Age: 38
End: 2023-12-07
Payer: COMMERCIAL

## 2023-12-07 ENCOUNTER — TELEPHONE (OUTPATIENT)
Dept: PHARMACY | Age: 38
End: 2023-12-07

## 2023-12-07 DIAGNOSIS — G45.9 TIA (TRANSIENT ISCHEMIC ATTACK): ICD-10-CM

## 2023-12-07 DIAGNOSIS — I63.429 CEREBROVASCULAR ACCIDENT (CVA) DUE TO EMBOLISM OF ANTERIOR CEREBRAL ARTERY, UNSPECIFIED BLOOD VESSEL LATERALITY (HCC): Primary | ICD-10-CM

## 2023-12-07 LAB
INTERNATIONAL NORMALIZATION RATIO, POC: 2.1
POC INR: 2.1 INDEX
PROTHROMBIN TIME, POC: 24.7 SECONDS (ref 10–14.3)

## 2023-12-07 PROCEDURE — 99211 OFF/OP EST MAY X REQ PHY/QHP: CPT

## 2023-12-07 PROCEDURE — 36416 COLLJ CAPILLARY BLOOD SPEC: CPT

## 2023-12-07 PROCEDURE — 85610 PROTHROMBIN TIME: CPT

## 2023-12-07 NOTE — PROGRESS NOTES
Medication Management Service  [facility]  [phone]    Visit Date: 12/7/2023   Subjective:       Minor Joshi is a 41 y.o. male who presents to clinic today for anticoagulation monitoring and adjustment. Patient seen in clinic for warfarin management due to  Indication:   CVA and TIA. INR goal: of  2.0-2.5 . Duration of therapy: indefinite. Patient reports the following:   Adherent with regimen  Missed or extra doses:  None   Bleeding or thromboembolic side effects:  None  Significant medication changes:  None  Significant dietary changes: None  Significant alcohol or tobacco changes: None  Significant recent illness, disease state changes, or hospitalization:  None  Upcoming surgeries or procedures:  None  Falls: None           Assessment and Plan     PT/INR done in office per protocol. INR today is 2.1, therapeutic. Plan: Will continue current regimen of warfarin 5mg daily. Recheck INR in 5 week(s). Patient verbalized understanding of dosing directions and information discussed. Dosing schedule given to patient including phone number, appointment date, and time. Progress note sent to referring office. Electronically signed by Estefani Peters [address] [place] [address] on 12/7/23     For Pharmacy Admin Tracking Only    Intervention Detail:   Total # of Interventions Recommended:    Total # of Interventions Accepted:   Time Spent (min): 15

## 2023-12-07 NOTE — TELEPHONE ENCOUNTER
Patient called to request earlier time- moved from 4:00pm to 2:00pm.   For Pharmacy Admin Tracking Only    Intervention Detail:   Total # of Interventions Recommended:    Total # of Interventions Accepted:   Time Spent (min): 5

## 2023-12-21 ENCOUNTER — TELEPHONE (OUTPATIENT)
Dept: PHARMACY | Age: 38
End: 2023-12-21

## 2023-12-21 NOTE — TELEPHONE ENCOUNTER
Patient left message, unable to hear but sounded like he was talking to other individual.   Patient returned call.     For Pharmacy Admin Tracking Only    Intervention Detail:   Total # of Interventions Recommended:   Total # of Interventions Accepted:   Time Spent (min): 5

## 2024-01-11 ENCOUNTER — TELEPHONE (OUTPATIENT)
Dept: PHARMACY | Age: 39
End: 2024-01-11

## 2024-01-11 NOTE — TELEPHONE ENCOUNTER
Patient left  stating he is unable to attend appointment today. Returned call. Rescheduled for 1/18.    For Pharmacy Admin Tracking Only    Time Spent (min): 5

## 2024-01-18 ENCOUNTER — TELEPHONE (OUTPATIENT)
Dept: PHARMACY | Age: 39
End: 2024-01-18

## 2024-01-18 NOTE — TELEPHONE ENCOUNTER
Rescheduled from today to 1/25/24.     For Pharmacy Admin Tracking Only    Intervention Detail:   Total # of Interventions Recommended:   Total # of Interventions Accepted:   Time Spent (min): 5

## 2024-01-25 ENCOUNTER — ANTI-COAG VISIT (OUTPATIENT)
Dept: PHARMACY | Age: 39
End: 2024-01-25
Payer: COMMERCIAL

## 2024-01-25 DIAGNOSIS — I63.429 CEREBROVASCULAR ACCIDENT (CVA) DUE TO EMBOLISM OF ANTERIOR CEREBRAL ARTERY, UNSPECIFIED BLOOD VESSEL LATERALITY (HCC): Primary | ICD-10-CM

## 2024-01-25 DIAGNOSIS — G45.9 TIA (TRANSIENT ISCHEMIC ATTACK): ICD-10-CM

## 2024-01-25 PROCEDURE — 36416 COLLJ CAPILLARY BLOOD SPEC: CPT

## 2024-01-25 PROCEDURE — 99211 OFF/OP EST MAY X REQ PHY/QHP: CPT

## 2024-01-25 PROCEDURE — 85610 PROTHROMBIN TIME: CPT

## 2024-01-25 NOTE — PROGRESS NOTES
Medication Management Service  [facility]  [phone]    Visit Date: 1/25/2024   Subjective:       Shane Treadwell is a 38 y.o. male who presents to clinic today for anticoagulation monitoring and adjustment.    Patient seen in clinic for warfarin management due to  Indication:   CVA and TIA.   INR goal: of  2.0-2.5 .  Duration of therapy: indefinite.    Patient reports the following:   Adherent with regimen  Missed or extra doses:  None   Bleeding or thromboembolic side effects:  None  Significant medication changes:  None  Significant dietary changes: None  Significant alcohol or tobacco changes: None  Significant recent illness, disease state changes, or hospitalization:  None  Upcoming surgeries or procedures:  None  Falls: None           Assessment and Plan     PT/INR done in office per protocol.   INR today is 1.9, subtherapeutic, just below goal range.  No identifiable changes  Plan:  Will continue current regimen of warfarin 5mg daily.     Recheck INR in 4 week(s).     Patient verbalized understanding of dosing directions and information discussed. Dosing schedule given to patient including phone number, appointment date, and time. Progress note sent to referring office.    Electronically signed by Nica Koehler RPH on 1/25/24     For Pharmacy Admin Tracking Only    Intervention Detail:   Total # of Interventions Recommended:   Total # of Interventions Accepted:   Time Spent (min): 15

## 2024-02-22 ENCOUNTER — TELEPHONE (OUTPATIENT)
Dept: PHARMACY | Age: 39
End: 2024-02-22

## 2024-03-01 ENCOUNTER — PROCEDURE VISIT (OUTPATIENT)
Dept: CARDIOLOGY CLINIC | Age: 39
End: 2024-03-01

## 2024-03-01 DIAGNOSIS — Z95.810 ICD (IMPLANTABLE CARDIOVERTER-DEFIBRILLATOR), DUAL, IN SITU: Primary | ICD-10-CM

## 2024-03-01 DIAGNOSIS — Z95.810 S/P ICD (INTERNAL CARDIAC DEFIBRILLATOR) PROCEDURE: ICD-10-CM

## 2024-03-04 ENCOUNTER — ANTI-COAG VISIT (OUTPATIENT)
Dept: PHARMACY | Age: 39
End: 2024-03-04
Payer: COMMERCIAL

## 2024-03-04 ENCOUNTER — OFFICE VISIT (OUTPATIENT)
Dept: CARDIOLOGY CLINIC | Age: 39
End: 2024-03-04
Payer: COMMERCIAL

## 2024-03-04 ENCOUNTER — TELEPHONE (OUTPATIENT)
Dept: PHARMACY | Age: 39
End: 2024-03-04

## 2024-03-04 VITALS
HEART RATE: 88 BPM | DIASTOLIC BLOOD PRESSURE: 66 MMHG | WEIGHT: 193 LBS | SYSTOLIC BLOOD PRESSURE: 104 MMHG | HEIGHT: 71 IN | BODY MASS INDEX: 27.02 KG/M2

## 2024-03-04 DIAGNOSIS — G45.9 TIA (TRANSIENT ISCHEMIC ATTACK): Primary | ICD-10-CM

## 2024-03-04 DIAGNOSIS — G45.9 TIA (TRANSIENT ISCHEMIC ATTACK): ICD-10-CM

## 2024-03-04 DIAGNOSIS — R47.01 APHASIA: ICD-10-CM

## 2024-03-04 DIAGNOSIS — I25.5 ISCHEMIC CARDIOMYOPATHY: ICD-10-CM

## 2024-03-04 DIAGNOSIS — I63.429 CEREBROVASCULAR ACCIDENT (CVA) DUE TO EMBOLISM OF ANTERIOR CEREBRAL ARTERY, UNSPECIFIED BLOOD VESSEL LATERALITY (HCC): Primary | ICD-10-CM

## 2024-03-04 DIAGNOSIS — I63.429 CEREBROVASCULAR ACCIDENT (CVA) DUE TO EMBOLISM OF ANTERIOR CEREBRAL ARTERY, UNSPECIFIED BLOOD VESSEL LATERALITY (HCC): ICD-10-CM

## 2024-03-04 DIAGNOSIS — Z95.810 S/P ICD (INTERNAL CARDIAC DEFIBRILLATOR) PROCEDURE: ICD-10-CM

## 2024-03-04 DIAGNOSIS — Z79.01 ANTICOAGULATED ON COUMADIN: ICD-10-CM

## 2024-03-04 LAB
INTERNATIONAL NORMALIZATION RATIO, POC: 1.9
POC INR: 1.9 INDEX
PROTHROMBIN TIME, POC: 23.4 SECONDS (ref 10–14.3)

## 2024-03-04 PROCEDURE — 36416 COLLJ CAPILLARY BLOOD SPEC: CPT

## 2024-03-04 PROCEDURE — 85610 PROTHROMBIN TIME: CPT

## 2024-03-04 PROCEDURE — 99211 OFF/OP EST MAY X REQ PHY/QHP: CPT

## 2024-03-04 PROCEDURE — 99214 OFFICE O/P EST MOD 30 MIN: CPT | Performed by: INTERNAL MEDICINE

## 2024-03-04 RX ORDER — CLOPIDOGREL BISULFATE 75 MG/1
75 TABLET ORAL DAILY
Qty: 90 TABLET | Refills: 3 | Status: SHIPPED | OUTPATIENT
Start: 2024-03-04

## 2024-03-04 RX ORDER — METOPROLOL SUCCINATE 100 MG/1
100 TABLET, EXTENDED RELEASE ORAL DAILY
Qty: 90 TABLET | Refills: 3 | Status: SHIPPED | OUTPATIENT
Start: 2024-03-04

## 2024-03-04 RX ORDER — LISINOPRIL 20 MG/1
20 TABLET ORAL DAILY
Qty: 90 TABLET | Refills: 2 | Status: SHIPPED | OUTPATIENT
Start: 2024-03-04

## 2024-03-04 RX ORDER — SPIRONOLACTONE 25 MG/1
25 TABLET ORAL DAILY
Qty: 90 TABLET | Refills: 3 | Status: SHIPPED | OUTPATIENT
Start: 2024-03-04

## 2024-03-04 RX ORDER — ATORVASTATIN CALCIUM 80 MG/1
80 TABLET, FILM COATED ORAL DAILY
Qty: 90 TABLET | Refills: 3 | Status: SHIPPED | OUTPATIENT
Start: 2024-03-04

## 2024-03-04 NOTE — TELEPHONE ENCOUNTER
Scheduled for today 3/4/24 after his appointment with Dr. Ferguson.     For Pharmacy Admin Tracking Only    Intervention Detail:   Total # of Interventions Recommended:   Total # of Interventions Accepted:   Time Spent (min): 5

## 2024-03-04 NOTE — PROGRESS NOTES
Medication Management Service  [facility]  [phone]    Visit Date: 3/4/2024   Subjective:       Shane Treadwell is a 38 y.o. male who presents to clinic today for anticoagulation monitoring and adjustment.    Patient seen in clinic for warfarin management due to  Indication:   CVA and TIA.   INR goal: of  2.0-2.5 .  Duration of therapy: indefinite.    Patient reports the following:   Adherent with regimen  Missed or extra doses:  None   Bleeding or thromboembolic side effects:  None  Significant medication changes:  None  Significant dietary changes: None  Significant alcohol or tobacco changes: None  Significant recent illness, disease state changes, or hospitalization:  None  Upcoming surgeries or procedures:  None  Falls: None           Assessment and Plan     PT/INR done in office per protocol.   INR today is 1.9, therapeutic, just below goal range.   Plan:  Will continue current regimen of warfarin 5mg daily.     Recheck INR in 4 week(s).     Patient verbalized understanding of dosing directions and information discussed. Dosing schedule given to patient including phone number, appointment date, and time. Progress note sent to referring office.    Electronically signed by Nica Koehler RPH on 3/4/24     For Pharmacy Admin Tracking Only    Intervention Detail: Dose Adjustment: 1, reason: Therapy Optimization  Total # of Interventions Recommended: 1  Total # of Interventions Accepted: 1  Time Spent (min): 15

## 2024-03-04 NOTE — PROGRESS NOTES
[facility] 94 08/22/2021     Lab Results   Component Value Date    ALT 30 08/22/2021    AST 30 08/22/2021     No results for input(s): \"WBC\", \"HGB\", \"HCT\", \"MCV\", \"PLT\" in the last 72 hours.  TSH: No results found for: \"TSH\"  Lab Results   Component Value Date    AST 30 08/22/2021    ALT 30 08/22/2021    BILITOT 0.4 08/22/2021    ALKPHOS 95 08/22/2021     No results found for: \"PROBNP\"  No results found for: \"LABA1C\"  Lab Results   Component Value Date    WBC 8.8 05/02/2022    HGB 15.4 05/02/2022    HCT 49.3 05/02/2022     05/02/2022     Cath 8/23/21  LMCA: Normal.     LAD: Abnormal.100% occluded prox LAD with Collaterals from Rt filling the LAD     LCx: Moderate Lumen Irregularity.Right dominance noted.  OM1 prox 50% stenosis     RCA: Normal.Right dominance noted.  Rt to Left Collaterals noted to LAD territory     Echo 8/23/21   Summary   Left ventricular systolic function is abnormal.   Ejection fraction is visually estimated at <20%.   Apical, septal, and anterior wall segments are akinetic.   Apical ballooning noted.   Severly dilated left ventricle measuring 7.0cm in diastole.   No evidence of any pericardial effusion.   Repeat study with Definity to rule out LV thrombus             All labs, medications and tests reviewed by myself including data and history from outside source , patient and available family .  Assessment & Plan:      1. TIA (transient ischemic attack)    2. Ischemic cardiomyopathy    3. Anticoagulated on Coumadin    4. S/P ICD (internal cardiac defibrillator) procedure    5. Aphasia    6. Cerebrovascular accident (CVA) due to embolism of anterior cerebral artery, unspecified blood vessel laterality (HCC)           ASCVD  S/p PCI of LAD in Dec 2021 continue plavix , He is on coumadin due to concern for CVA that he had probably related to cardiomyopathy       Ischemic cardiomyopathy   History of 20% wearing LifeVest continue to titrate up medication advised to restart Toprol- mg

## 2024-03-04 NOTE — PATIENT INSTRUCTIONS
We are committed to providing you the best care possible.    If you receive a survey after visiting one of our offices, please take time to share your experience concerning your physician office visit.  These surveys are confidential and no health information about you is shared.    We are eager to improve for you and we are counting on your feedback to help make that happen.      **It is YOUR responsibilty to bring medication bottles and/or updated medication list to EACH APPOINTMENT. This will allow us to better serve you and all your healthcare needs**  Thank you for allowing us to care for you today!   We want to ensure we can follow your treatment plan and we strive to give you the best outcomes and experience possible.   If you ever have a life threatening emergency and call 911 - for an ambulance (EMS)   Our providers can only care for you at:   [facility] or [facility].   Even if you have someone take you or you drive yourself we can only care for you in a [facility] facility. Our providers are not setup at the other healthcare locations!   Please be informed that if you contact our office outside of normal business hours the physician on call cannot help with any scheduling or rescheduling issues, procedure instruction questions or any type of medication issue.    We advise you for any urgent/emergency that you go to the nearest emergency room!    PLEASE CALL OUR OFFICE DURING NORMAL BUSINESS HOURS    Monday - Friday   8 am to 5 pm    [place]: [phone]    [place]: [phone]    [place]:  [phone]

## 2024-03-04 NOTE — TELEPHONE ENCOUNTER
Scheduled for 3:45pm today.     For Pharmacy Admin Tracking Only    Intervention Detail:   Total # of Interventions Recommended:   Total # of Interventions Accepted:   Time Spent (min): 5

## 2024-03-06 RX ORDER — METOPROLOL SUCCINATE 100 MG/1
TABLET, EXTENDED RELEASE ORAL
Qty: 30 TABLET | OUTPATIENT
Start: 2024-03-06

## 2024-03-31 RX ORDER — WARFARIN SODIUM 5 MG/1
TABLET ORAL
Qty: 90 TABLET | Refills: 1 | Status: SHIPPED | OUTPATIENT
Start: 2024-03-31

## 2024-04-01 ENCOUNTER — ANTI-COAG VISIT (OUTPATIENT)
Dept: PHARMACY | Age: 39
End: 2024-04-01
Payer: COMMERCIAL

## 2024-04-01 DIAGNOSIS — G45.9 TIA (TRANSIENT ISCHEMIC ATTACK): ICD-10-CM

## 2024-04-01 DIAGNOSIS — I63.429 CEREBROVASCULAR ACCIDENT (CVA) DUE TO EMBOLISM OF ANTERIOR CEREBRAL ARTERY, UNSPECIFIED BLOOD VESSEL LATERALITY (HCC): Primary | ICD-10-CM

## 2024-04-01 LAB
INTERNATIONAL NORMALIZATION RATIO, POC: 2.5
POC INR: 2.5 INDEX
PROTHROMBIN TIME, POC: 30.6 SECONDS (ref 10–14.3)

## 2024-04-01 PROCEDURE — 85610 PROTHROMBIN TIME: CPT

## 2024-04-01 PROCEDURE — 99211 OFF/OP EST MAY X REQ PHY/QHP: CPT

## 2024-04-01 PROCEDURE — 36416 COLLJ CAPILLARY BLOOD SPEC: CPT

## 2024-04-01 NOTE — PROGRESS NOTES
Medication Management Service  [facility]  [phone]    Visit Date: 4/1/2024   Subjective:       Shane Treadwell is a 38 y.o. male who presents to clinic today for anticoagulation monitoring and adjustment.    Patient seen in clinic for warfarin management due to  Indication:   CVA and TIA.   INR goal: of  2.0-2.5 .  Duration of therapy: indefinite.    Patient reports the following:   Adherent with regimen  Missed or extra doses:  None   Bleeding or thromboembolic side effects:  None  Significant medication changes:  None  Significant dietary changes: None  Significant alcohol or tobacco changes: None  Significant recent illness, disease state changes, or hospitalization:  None  Upcoming surgeries or procedures:  None  Falls: None           Assessment and Plan     PT/INR done in office per protocol.   INR today is 2.5, therapeutic.          Plan:  Will continue current regimen of warfarin 5mg daily.     Recheck INR in 4 week(s).     Patient verbalized understanding of dosing directions and information discussed. Dosing schedule given to patient including phone number, appointment date, and time. Progress note sent to referring office.    Electronically signed by Nica Koehler RPH on 4/1/24     For Pharmacy Admin Tracking Only    Intervention Detail:   Total # of Interventions Recommended:   Total # of Interventions Accepted:   Time Spent (min): 15

## 2024-04-29 ENCOUNTER — ANTI-COAG VISIT (OUTPATIENT)
Dept: PHARMACY | Age: 39
End: 2024-04-29
Payer: COMMERCIAL

## 2024-04-29 DIAGNOSIS — G45.9 TIA (TRANSIENT ISCHEMIC ATTACK): ICD-10-CM

## 2024-04-29 DIAGNOSIS — I63.429 CEREBROVASCULAR ACCIDENT (CVA) DUE TO EMBOLISM OF ANTERIOR CEREBRAL ARTERY, UNSPECIFIED BLOOD VESSEL LATERALITY (HCC): Primary | ICD-10-CM

## 2024-04-29 LAB
INTERNATIONAL NORMALIZATION RATIO, POC: 2.4
POC INR: 2.4 INDEX
PROTHROMBIN TIME, POC: 29.1 SECONDS (ref 10–14.3)

## 2024-04-29 PROCEDURE — 36416 COLLJ CAPILLARY BLOOD SPEC: CPT

## 2024-04-29 PROCEDURE — 85610 PROTHROMBIN TIME: CPT

## 2024-04-29 PROCEDURE — 99211 OFF/OP EST MAY X REQ PHY/QHP: CPT

## 2024-04-29 NOTE — PROGRESS NOTES
Medication Management Service  [facility]  [phone]    Visit Date: 4/29/2024   Subjective:       Shane Treadwell is a 38 y.o. male who presents to clinic today for anticoagulation monitoring and adjustment.    Patient seen in clinic for warfarin management due to  Indication:   CVA and TIA.   INR goal: of  2.0-2.5 .  Duration of therapy: indefinite.    Patient reports the following:   Adherent with regimen  Missed or extra doses:  None   Bleeding or thromboembolic side effects:  None  Significant medication changes:  None  Significant dietary changes: None  Significant alcohol or tobacco changes: None  Significant recent illness, disease state changes, or hospitalization:  None  Upcoming surgeries or procedures:  None  Falls: None           Assessment and Plan     PT/INR done in office per protocol.   INR today is 2.4, therapeutic.          Plan:  Will continue current regimen of warfarin 5mg daily.     Recheck INR in 5 week(s).     Patient verbalized understanding of dosing directions and information discussed. Dosing schedule given to patient including phone number, appointment date, and time. Progress note sent to referring office.    Electronically signed by Nica Koehler RPH on 4/29/24     For Pharmacy Admin Tracking Only    Intervention Detail:   Total # of Interventions Recommended:   Total # of Interventions Accepted:   Time Spent (min): 15

## 2024-06-03 ENCOUNTER — TELEPHONE (OUTPATIENT)
Dept: PHARMACY | Age: 39
End: 2024-06-03

## 2024-06-03 ENCOUNTER — APPOINTMENT (OUTPATIENT)
Dept: PHARMACY | Age: 39
End: 2024-06-03

## 2024-06-03 NOTE — TELEPHONE ENCOUNTER
Rescheduled from today to 6/10/24.     For Pharmacy Admin Tracking Only    Intervention Detail:   Total # of Interventions Recommended:   Total # of Interventions Accepted:   Time Spent (min): 5

## 2024-06-04 ENCOUNTER — PROCEDURE VISIT (OUTPATIENT)
Dept: CARDIOLOGY CLINIC | Age: 39
End: 2024-06-04

## 2024-06-04 ENCOUNTER — TELEPHONE (OUTPATIENT)
Dept: CARDIOLOGY CLINIC | Age: 39
End: 2024-06-04

## 2024-06-04 DIAGNOSIS — Z95.810 ICD (IMPLANTABLE CARDIOVERTER-DEFIBRILLATOR), DUAL, IN SITU: Primary | ICD-10-CM

## 2024-06-04 DIAGNOSIS — Z95.810 S/P ICD (INTERNAL CARDIAC DEFIBRILLATOR) PROCEDURE: ICD-10-CM

## 2024-06-10 ENCOUNTER — ANTI-COAG VISIT (OUTPATIENT)
Dept: PHARMACY | Age: 39
End: 2024-06-10

## 2024-06-10 DIAGNOSIS — I63.429 CEREBROVASCULAR ACCIDENT (CVA) DUE TO EMBOLISM OF ANTERIOR CEREBRAL ARTERY, UNSPECIFIED BLOOD VESSEL LATERALITY (HCC): Primary | ICD-10-CM

## 2024-06-10 DIAGNOSIS — G45.9 TIA (TRANSIENT ISCHEMIC ATTACK): ICD-10-CM

## 2024-06-10 LAB
INTERNATIONAL NORMALIZATION RATIO, POC: 2
POC INR: 2 INDEX
PROTHROMBIN TIME, POC: 24.5 SECONDS (ref 10–14.3)

## 2024-06-10 PROCEDURE — 36416 COLLJ CAPILLARY BLOOD SPEC: CPT

## 2024-06-10 PROCEDURE — 85610 PROTHROMBIN TIME: CPT

## 2024-06-10 PROCEDURE — 99212 OFFICE O/P EST SF 10 MIN: CPT

## 2024-06-10 RX ORDER — WARFARIN SODIUM 5 MG/1
5 TABLET ORAL DAILY
Qty: 90 TABLET | Refills: 1 | Status: SHIPPED | OUTPATIENT
Start: 2024-06-10 | End: 2024-06-10

## 2024-06-10 RX ORDER — WARFARIN SODIUM 5 MG/1
5 TABLET ORAL DAILY
Qty: 90 TABLET | Refills: 1 | Status: SHIPPED | OUTPATIENT
Start: 2024-06-10

## 2024-06-10 NOTE — PROGRESS NOTES
Medication Management Service  [facility]  [phone]    Visit Date: 6/10/2024   Subjective:       Shane Treadwell is a 38 y.o. male who presents to clinic today for anticoagulation monitoring and adjustment.    Patient seen in clinic for warfarin management due to  Indication:   CVA and TIA.   INR goal: of  2.0-2.5 .  Duration of therapy: indefinite.    Patient reports the following:   Adherent with regimen  Missed or extra doses:  None   Bleeding or thromboembolic side effects:  None  Significant medication changes:  None  Significant dietary changes: None  Significant alcohol or tobacco changes: None  Significant recent illness, disease state changes, or hospitalization:  None  Upcoming surgeries or procedures:  None  Falls: None           Assessment and Plan     PT/INR done in office per protocol.   INR today is 2.0, therapeutic.        Refill requested  Plan:  Will continue current regimen of warfarin 5mg daily.     Erx sent     Recheck INR in 4 week(s).     Patient verbalized understanding of dosing directions and information discussed. Dosing schedule given to patient including phone number, appointment date, and time. Progress note sent to referring office.    Electronically signed by Nica Koehler RPH on 6/10/24     For Pharmacy Admin Tracking Only    Intervention Detail:   Total # of Interventions Recommended:   Total # of Interventions Accepted:   Time Spent (min): 15

## 2024-06-11 DIAGNOSIS — G45.9 TIA (TRANSIENT ISCHEMIC ATTACK): Primary | ICD-10-CM

## 2024-06-11 DIAGNOSIS — I63.429 CEREBROVASCULAR ACCIDENT (CVA) DUE TO EMBOLISM OF ANTERIOR CEREBRAL ARTERY, UNSPECIFIED BLOOD VESSEL LATERALITY (HCC): ICD-10-CM

## 2024-07-08 ENCOUNTER — ANTI-COAG VISIT (OUTPATIENT)
Dept: PHARMACY | Age: 39
End: 2024-07-08
Payer: COMMERCIAL

## 2024-07-08 DIAGNOSIS — G45.9 TIA (TRANSIENT ISCHEMIC ATTACK): ICD-10-CM

## 2024-07-08 DIAGNOSIS — I63.429 CEREBROVASCULAR ACCIDENT (CVA) DUE TO EMBOLISM OF ANTERIOR CEREBRAL ARTERY, UNSPECIFIED BLOOD VESSEL LATERALITY (HCC): Primary | ICD-10-CM

## 2024-07-08 LAB
INTERNATIONAL NORMALIZATION RATIO, POC: 2.1
POC INR: 2.1 INDEX
PROTHROMBIN TIME, POC: 25.3
PROTHROMBIN TIME, POC: 25.3 SECONDS (ref 10–14.3)

## 2024-07-08 PROCEDURE — 36416 COLLJ CAPILLARY BLOOD SPEC: CPT

## 2024-07-08 PROCEDURE — 99211 OFF/OP EST MAY X REQ PHY/QHP: CPT

## 2024-07-08 PROCEDURE — 85610 PROTHROMBIN TIME: CPT

## 2024-07-08 NOTE — PROGRESS NOTES
Medication Management Service  [facility]  [phone]    Visit Date: 7/8/2024   Subjective:       Shane Treadwell is a 38 y.o. male who presents to clinic today for anticoagulation monitoring and adjustment.    Patient seen in clinic for warfarin management due to  Indication:   CVA and TIA.   INR goal: of  2.0-2.5 .  Duration of therapy: indefinite.    Patient reports the following:   Adherent with regimen  Missed or extra doses:  None   Bleeding or thromboembolic side effects:  None  Significant medication changes:  None  Significant dietary changes: None  Significant alcohol or tobacco changes: None  Significant recent illness, disease state changes, or hospitalization:  None  Upcoming surgeries or procedures:  None  Falls: None           Assessment and Plan     PT/INR done in office per protocol.   INR today is 2.1, therapeutic.          Plan:  Will continue current regimen of warfarin 5mg daily.     Recheck INR in 4.5 week(s).     Patient verbalized understanding of dosing directions and information discussed. Dosing schedule given to patient including phone number, appointment date, and time. Progress note sent to referring office.    Electronically signed by Nica Koehler RPH on 7/8/24     For Pharmacy Admin Tracking Only    Intervention Detail:   Total # of Interventions Recommended:   Total # of Interventions Accepted:   Time Spent (min): 15

## 2024-08-08 ENCOUNTER — ANTI-COAG VISIT (OUTPATIENT)
Dept: PHARMACY | Age: 39
End: 2024-08-08
Payer: COMMERCIAL

## 2024-08-08 DIAGNOSIS — G45.9 TIA (TRANSIENT ISCHEMIC ATTACK): ICD-10-CM

## 2024-08-08 DIAGNOSIS — I63.429 CEREBROVASCULAR ACCIDENT (CVA) DUE TO EMBOLISM OF ANTERIOR CEREBRAL ARTERY, UNSPECIFIED BLOOD VESSEL LATERALITY (HCC): Primary | ICD-10-CM

## 2024-08-08 LAB
INTERNATIONAL NORMALIZATION RATIO, POC: 1.9
POC INR: 1.9 INDEX
PROTHROMBIN TIME, POC: 22.7 SECONDS (ref 10–14.3)
PROTHROMBIN TIME, POC: NORMAL

## 2024-08-08 PROCEDURE — 85610 PROTHROMBIN TIME: CPT

## 2024-08-08 PROCEDURE — 99211 OFF/OP EST MAY X REQ PHY/QHP: CPT

## 2024-08-08 NOTE — PROGRESS NOTES
Medication Management Service  [facility]  [phone]    Visit Date: 8/8/2024   Subjective:       Shane Treadwell is a 38 y.o. male who presents to clinic today for anticoagulation monitoring and adjustment.    Patient seen in clinic for warfarin management due to  Indication:   CVA and TIA.   INR goal: of  2.0-2.5 .  Duration of therapy: indefinite.    Patient reports the following:   Adherent with regimen  Missed or extra doses:  None   Bleeding or thromboembolic side effects:  None  Significant medication changes:  None  Significant dietary changes: None  Significant alcohol or tobacco changes: None  Significant recent illness, disease state changes, or hospitalization:  None  Upcoming surgeries or procedures:  None  Falls: None           Assessment and Plan     PT/INR done in office per protocol.   INR today is 1.9, subtherapeutic, just below goal range.   Reports he may have missed a dose, day unknonw.     Plan:  Will continue current regimen of warfarin 5mg daily     Recheck INR in 4 week(s).     Patient verbalized understanding of dosing directions and information discussed. Dosing schedule given to patient including phone number, appointment date, and time. Progress note sent to referring office.    Electronically signed by Nica Koehler RPH on 8/8/24     For Pharmacy Admin Tracking Only    Intervention Detail:   Total # of Interventions Recommended:   Total # of Interventions Accepted:   Time Spent (min): 15

## 2024-09-05 ENCOUNTER — TELEPHONE (OUTPATIENT)
Dept: PHARMACY | Age: 39
End: 2024-09-05

## 2024-09-05 DIAGNOSIS — G45.9 TIA (TRANSIENT ISCHEMIC ATTACK): ICD-10-CM

## 2024-09-05 DIAGNOSIS — I63.429 CEREBROVASCULAR ACCIDENT (CVA) DUE TO EMBOLISM OF ANTERIOR CEREBRAL ARTERY, UNSPECIFIED BLOOD VESSEL LATERALITY (HCC): Primary | ICD-10-CM

## 2024-09-05 NOTE — TELEPHONE ENCOUNTER
Patient called to cancel today's appointment, is out of town and won't be back until much later.   Scheduled for 9/9/24.   He missed warfarin for 2 days. Instructed him to take warfarin 7.5mg daily for 2 days then return to warfarin 5mg daily. Patient repeated and voiced understanding.     For Pharmacy Admin Tracking Only    Intervention Detail:   Total # of Interventions Recommended:   Total # of Interventions Accepted:   Time Spent (min): 5

## 2024-09-09 ENCOUNTER — OFFICE VISIT (OUTPATIENT)
Dept: CARDIOLOGY CLINIC | Age: 39
End: 2024-09-09
Payer: COMMERCIAL

## 2024-09-09 ENCOUNTER — ANTI-COAG VISIT (OUTPATIENT)
Dept: PHARMACY | Age: 39
End: 2024-09-09
Payer: COMMERCIAL

## 2024-09-09 VITALS
HEIGHT: 71 IN | HEART RATE: 65 BPM | WEIGHT: 194.2 LBS | BODY MASS INDEX: 27.19 KG/M2 | SYSTOLIC BLOOD PRESSURE: 100 MMHG | DIASTOLIC BLOOD PRESSURE: 68 MMHG

## 2024-09-09 DIAGNOSIS — I25.5 ISCHEMIC CARDIOMYOPATHY: ICD-10-CM

## 2024-09-09 DIAGNOSIS — G45.9 TIA (TRANSIENT ISCHEMIC ATTACK): ICD-10-CM

## 2024-09-09 DIAGNOSIS — I25.10 ASCVD (ARTERIOSCLEROTIC CARDIOVASCULAR DISEASE): Primary | ICD-10-CM

## 2024-09-09 DIAGNOSIS — Z95.810 ICD (IMPLANTABLE CARDIOVERTER-DEFIBRILLATOR), DUAL, IN SITU: ICD-10-CM

## 2024-09-09 DIAGNOSIS — Z79.01 ANTICOAGULATED ON COUMADIN: ICD-10-CM

## 2024-09-09 DIAGNOSIS — I63.429 CEREBROVASCULAR ACCIDENT (CVA) DUE TO EMBOLISM OF ANTERIOR CEREBRAL ARTERY, UNSPECIFIED BLOOD VESSEL LATERALITY (HCC): Primary | ICD-10-CM

## 2024-09-09 DIAGNOSIS — I63.429 CEREBROVASCULAR ACCIDENT (CVA) DUE TO EMBOLISM OF ANTERIOR CEREBRAL ARTERY, UNSPECIFIED BLOOD VESSEL LATERALITY (HCC): ICD-10-CM

## 2024-09-09 DIAGNOSIS — I50.22 CHRONIC SYSTOLIC CONGESTIVE HEART FAILURE (HCC): ICD-10-CM

## 2024-09-09 LAB
INTERNATIONAL NORMALIZATION RATIO, POC: 1.4
POC INR: 1.4 (ref 0.9–1.2)
PROTHROMBIN TIME, POC: 16.6
PROTHROMBIN TIME, POC: 16.6 SEC (ref 10–14.3)

## 2024-09-09 PROCEDURE — 99214 OFFICE O/P EST MOD 30 MIN: CPT | Performed by: NURSE PRACTITIONER

## 2024-09-09 PROCEDURE — 99212 OFFICE O/P EST SF 10 MIN: CPT

## 2024-09-09 PROCEDURE — 93000 ELECTROCARDIOGRAM COMPLETE: CPT | Performed by: NURSE PRACTITIONER

## 2024-09-09 PROCEDURE — 85610 PROTHROMBIN TIME: CPT

## 2024-09-09 ASSESSMENT — ENCOUNTER SYMPTOMS
SHORTNESS OF BREATH: 0
COUGH: 0

## 2024-09-12 ENCOUNTER — PROCEDURE VISIT (OUTPATIENT)
Dept: CARDIOLOGY CLINIC | Age: 39
End: 2024-09-12

## 2024-09-12 DIAGNOSIS — Z95.810 ICD (IMPLANTABLE CARDIOVERTER-DEFIBRILLATOR), DUAL, IN SITU: Primary | ICD-10-CM

## 2024-09-12 DIAGNOSIS — Z95.810 S/P ICD (INTERNAL CARDIAC DEFIBRILLATOR) PROCEDURE: ICD-10-CM

## 2024-09-16 ENCOUNTER — ANTI-COAG VISIT (OUTPATIENT)
Dept: PHARMACY | Age: 39
End: 2024-09-16
Payer: COMMERCIAL

## 2024-09-16 DIAGNOSIS — G45.9 TIA (TRANSIENT ISCHEMIC ATTACK): ICD-10-CM

## 2024-09-16 DIAGNOSIS — I63.429 CEREBROVASCULAR ACCIDENT (CVA) DUE TO EMBOLISM OF ANTERIOR CEREBRAL ARTERY, UNSPECIFIED BLOOD VESSEL LATERALITY (HCC): Primary | ICD-10-CM

## 2024-09-16 LAB
INTERNATIONAL NORMALIZATION RATIO, POC: 1.7
POC INR: 1.7 (ref 0.9–1.2)
PROTHROMBIN TIME, POC: 20.5
PROTHROMBIN TIME, POC: 20.5 SEC (ref 10–14.3)

## 2024-09-16 PROCEDURE — 99213 OFFICE O/P EST LOW 20 MIN: CPT

## 2024-09-16 PROCEDURE — 85610 PROTHROMBIN TIME: CPT

## 2024-09-16 RX ORDER — WARFARIN SODIUM 5 MG/1
5 TABLET ORAL DAILY
Qty: 98 TABLET | Refills: 0 | Status: SHIPPED | OUTPATIENT
Start: 2024-09-16

## 2024-09-23 ENCOUNTER — ANTI-COAG VISIT (OUTPATIENT)
Dept: PHARMACY | Age: 39
End: 2024-09-23
Payer: COMMERCIAL

## 2024-09-23 DIAGNOSIS — I63.429 CEREBROVASCULAR ACCIDENT (CVA) DUE TO EMBOLISM OF ANTERIOR CEREBRAL ARTERY, UNSPECIFIED BLOOD VESSEL LATERALITY (HCC): Primary | ICD-10-CM

## 2024-09-23 DIAGNOSIS — G45.9 TIA (TRANSIENT ISCHEMIC ATTACK): ICD-10-CM

## 2024-09-23 LAB
INTERNATIONAL NORMALIZATION RATIO, POC: 1.8
POC INR: 1.8 (ref 0.9–1.2)
PROTHROMBIN TIME, POC: 21.5
PROTHROMBIN TIME, POC: 21.5 SEC (ref 10–14.3)

## 2024-09-23 PROCEDURE — 99211 OFF/OP EST MAY X REQ PHY/QHP: CPT

## 2024-09-23 PROCEDURE — 85610 PROTHROMBIN TIME: CPT

## 2024-10-01 ENCOUNTER — LAB (OUTPATIENT)
Dept: CARDIOLOGY CLINIC | Age: 39
End: 2024-10-01
Payer: COMMERCIAL

## 2024-10-01 DIAGNOSIS — I50.22 CHRONIC SYSTOLIC CONGESTIVE HEART FAILURE (HCC): ICD-10-CM

## 2024-10-01 PROCEDURE — 36415 COLL VENOUS BLD VENIPUNCTURE: CPT | Performed by: INTERNAL MEDICINE

## 2024-10-02 ENCOUNTER — TELEPHONE (OUTPATIENT)
Dept: CARDIOLOGY CLINIC | Age: 39
End: 2024-10-02

## 2024-10-02 LAB
ALBUMIN SERPL-MCNC: 4 G/DL (ref 3.4–5)
ALBUMIN/GLOB SERPL: 1.9 {RATIO} (ref 1.1–2.2)
ALP SERPL-CCNC: 101 U/L (ref 40–129)
ALT SERPL-CCNC: 30 U/L (ref 10–40)
ANION GAP SERPL CALCULATED.3IONS-SCNC: 11 MMOL/L (ref 3–16)
AST SERPL-CCNC: 24 U/L (ref 15–37)
BILIRUB SERPL-MCNC: <0.2 MG/DL (ref 0–1)
BUN SERPL-MCNC: 14 MG/DL (ref 7–20)
CALCIUM SERPL-MCNC: 9 MG/DL (ref 8.3–10.6)
CHLORIDE SERPL-SCNC: 106 MMOL/L (ref 99–110)
CHOLEST SERPL-MCNC: 163 MG/DL (ref 0–199)
CO2 SERPL-SCNC: 23 MMOL/L (ref 21–32)
CREAT SERPL-MCNC: 1.1 MG/DL (ref 0.9–1.3)
DEPRECATED RDW RBC AUTO: 15.2 % (ref 12.4–15.4)
GFR SERPLBLD CREATININE-BSD FMLA CKD-EPI: 88 ML/MIN/{1.73_M2}
GLUCOSE SERPL-MCNC: 97 MG/DL (ref 70–99)
HCT VFR BLD AUTO: 42.8 % (ref 40.5–52.5)
HDLC SERPL-MCNC: 28 MG/DL (ref 40–60)
HGB BLD-MCNC: 14.4 G/DL (ref 13.5–17.5)
LDLC SERPL CALC-MCNC: 104 MG/DL
MCH RBC QN AUTO: 27.3 PG (ref 26–34)
MCHC RBC AUTO-ENTMCNC: 33.6 G/DL (ref 31–36)
MCV RBC AUTO: 81.2 FL (ref 80–100)
PLATELET # BLD AUTO: 179 K/UL (ref 135–450)
PMV BLD AUTO: 9.1 FL (ref 5–10.5)
POTASSIUM SERPL-SCNC: 4.3 MMOL/L (ref 3.5–5.1)
PROT SERPL-MCNC: 6.1 G/DL (ref 6.4–8.2)
RBC # BLD AUTO: 5.27 M/UL (ref 4.2–5.9)
SODIUM SERPL-SCNC: 140 MMOL/L (ref 136–145)
TRIGL SERPL-MCNC: 156 MG/DL (ref 0–150)
VLDLC SERPL CALC-MCNC: 31 MG/DL
WBC # BLD AUTO: 6.7 K/UL (ref 4–11)

## 2024-10-02 NOTE — TELEPHONE ENCOUNTER
----- Message from RICARDO Moser CNP sent at 10/1/2024  5:02 PM EDT -----  Continue current plan. Not significantly different  ----- Message -----  From: Hernandez Eagle MD  Sent: 10/1/2024   4:55 PM EDT  To: RICARDO Beauchamp CNP

## 2024-10-02 NOTE — TELEPHONE ENCOUNTER
Called pt in regards to testing results. Pt voiced understanding of current plan and next ov at this time.    Echo (TTE) complete (PRN contrast/bubble/strain/3D)  Order: [id number]  Status: Final result       Visible to patient: Yes (not seen)       Dx: Chronic systolic congestive heart lise...    0 Result Notes  Details    Reading Physician Reading Date Result Priority   Hernandez Eagle MD  [phone] 10/1/2024 Routine     Result Text       Left Ventricle: Severely reduced left ventricular systolic function with a visually estimated EF of 20 - 25%. Left ventricle is severely dilated. Normal wall thickness. Grade III diastolic dysfunction with increased LAP.    Right Ventricle: ICD wire noted in the right side of the heart.    Mitral Valve: Mild regurgitation.    Tricuspid Valve: Mild regurgitation. The estimated RVSP is 27 mmHg.    Pericardium: No pericardial effusion.    Image quality is good.    When compared to previous study in 8/2023, no significant changes noted.

## 2024-10-04 ENCOUNTER — TELEPHONE (OUTPATIENT)
Dept: CARDIOLOGY CLINIC | Age: 39
End: 2024-10-04

## 2024-10-04 NOTE — TELEPHONE ENCOUNTER
----- Message from GENTRY VALE MA sent at 10/3/2024  4:17 PM EDT -----    ----- Message -----  From: Aydee Miles MA  Sent: 10/3/2024   4:16 PM EDT  To: Gloria Jones MA      ----- Message -----  From: Nyla Andrews APRN - CNP  Sent: 10/3/2024   1:06 PM EDT  To: Aydee Miles MA    Cholesterol is still elevated. Recommend starting zetia 10 mg Daily. If he is willing.  ----- Message -----  From: [facility] Incoming Lab Results From Soft (Epic Adt)  Sent: 10/2/2024   7:45 AM EDT  To: RICARDO Beauchamp CNP

## 2024-10-04 NOTE — TELEPHONE ENCOUNTER
Called pt in regards to testing results. Pt voice understanding of current plan and next ov at this time.        Component  Ref Range & Units3 d ago3 yr agoCholesterol, Total  0 - 199 mg/dL  163  149 R, CM  Triglycerides  0 - 150 mg/dL156 High   148 R  HDL  40 - 60 mg/dL28 Low 28 Low  RComment: An HDL cholesterol less than 40 mg/dL is low and  constitutes a coronary heart disease risk factor.  An HDL cholesterol greater than 60 mg/dL is a  negative risk factor for coronary heart disease.  LDL Cholesterol  <100 mg/dL104 High VLDL Cholesterol Calculated  Not Established mg/dL  31  LDL Direct  94 R  Resulting [facility]

## 2024-10-07 ENCOUNTER — TELEPHONE (OUTPATIENT)
Dept: PHARMACY | Age: 39
End: 2024-10-07

## 2024-10-10 NOTE — TELEPHONE ENCOUNTER
Scheduled for 10/17.   For Pharmacy Admin Tracking Only    Intervention Detail:   Total # of Interventions Recommended:   Total # of Interventions Accepted:   Time Spent (min): 5

## 2024-10-16 NOTE — PROGRESS NOTES
Medication Management Service  [facility]  [phone]    Visit Date: 10/17/2024   Subjective:       Shane Treadwell is a 38 y.o. male who presents to clinic today for anticoagulation monitoring and adjustment.    Patient seen in clinic for warfarin management due to  Indication:   CVA and TIA.   INR goal: of  2.0-2.5 .  Duration of therapy: indefinite.    Patient reports the following:   Adherent with regimen  Missed or extra doses:  None   Bleeding or thromboembolic side effects:  None  Significant medication changes:  None  Significant dietary changes: None  Significant alcohol or tobacco changes: None  Significant recent illness, disease state changes, or hospitalization:  None  Upcoming surgeries or procedures:  None  Falls: None           Assessment and Plan     PT/INR done in office per protocol.   INR today is 1.8, subtherapeutic.        Reports he was out of town and took 5mg instead of 7.5mg on at least 1 day  Plan:  Take warfarin 7.5mg today then will continue current regimen of warfarin 5mg daily except 7.5mg on Mondays.     Recheck INR in 3 week(s).     Patient verbalized understanding of dosing directions and information discussed. Dosing schedule given to patient including phone number, appointment date, and time. Progress note sent to referring office.    Electronically signed by Nica Koehler RPH on 10/16/24     For Pharmacy Admin Tracking Only    Intervention Detail: Dose Adjustment: 1, reason: Therapy Optimization  Total # of Interventions Recommended: 1  Total # of Interventions Accepted: 1  Time Spent (min): 15

## 2024-10-17 ENCOUNTER — ANTI-COAG VISIT (OUTPATIENT)
Dept: PHARMACY | Age: 39
End: 2024-10-17
Payer: COMMERCIAL

## 2024-10-17 DIAGNOSIS — G45.9 TIA (TRANSIENT ISCHEMIC ATTACK): ICD-10-CM

## 2024-10-17 DIAGNOSIS — I63.429 CEREBROVASCULAR ACCIDENT (CVA) DUE TO EMBOLISM OF ANTERIOR CEREBRAL ARTERY, UNSPECIFIED BLOOD VESSEL LATERALITY (HCC): Primary | ICD-10-CM

## 2024-10-17 LAB
INTERNATIONAL NORMALIZATION RATIO, POC: 1.8
POC INR: 1.8 (ref 0.9–1.2)
PROTHROMBIN TIME, POC: 22
PROTHROMBIN TIME, POC: 22 SEC (ref 10–14.3)

## 2024-10-17 PROCEDURE — 99212 OFFICE O/P EST SF 10 MIN: CPT

## 2024-10-17 PROCEDURE — 85610 PROTHROMBIN TIME: CPT

## 2024-11-07 ENCOUNTER — ANTI-COAG VISIT (OUTPATIENT)
Dept: PHARMACY | Age: 39
End: 2024-11-07
Payer: COMMERCIAL

## 2024-11-07 DIAGNOSIS — I63.429 CEREBROVASCULAR ACCIDENT (CVA) DUE TO EMBOLISM OF ANTERIOR CEREBRAL ARTERY, UNSPECIFIED BLOOD VESSEL LATERALITY (HCC): Primary | ICD-10-CM

## 2024-11-07 DIAGNOSIS — G45.9 TIA (TRANSIENT ISCHEMIC ATTACK): ICD-10-CM

## 2024-11-07 LAB
INTERNATIONAL NORMALIZATION RATIO, POC: 2
POC INR: 2 (ref 0.9–1.2)
PROTHROMBIN TIME, POC: 23.7
PROTHROMBIN TIME, POC: 23.7 SEC (ref 10–14.3)

## 2024-11-07 PROCEDURE — 99212 OFFICE O/P EST SF 10 MIN: CPT

## 2024-11-07 PROCEDURE — 85610 PROTHROMBIN TIME: CPT

## 2024-11-07 RX ORDER — WARFARIN SODIUM 5 MG/1
5 TABLET ORAL DAILY
Qty: 98 TABLET | Refills: 0 | Status: SHIPPED | OUTPATIENT
Start: 2024-11-07

## 2024-11-07 NOTE — PROGRESS NOTES
Medication Management Service  [facility]  [phone]    Visit Date: 11/7/2024   Subjective:       Shane Treadwell is a 38 y.o. male who presents to clinic today for anticoagulation monitoring and adjustment.    Patient seen in clinic for warfarin management due to  Indication:   CVA and TIA.   INR goal: of  2.0-2.5 .  Duration of therapy: indefinite.    Patient reports the following:   Adherent with regimen  Missed or extra doses:  None   Bleeding or thromboembolic side effects:  None  Significant medication changes:  OTC cold med  Significant dietary changes: None  Significant alcohol or tobacco changes: None  Significant recent illness, disease state changes, or hospitalization:  cough/cold today, started 1 day ago  Upcoming surgeries or procedures:  None  Falls: None           Assessment and Plan     PT/INR done in office per protocol.   INR today is 2.0, therapeutic.        Refill requested  Plan:  Will continue current regimen of warfarin 5mg daily except 7.5mg on Mondays.  Erx sent to [facility]     Recheck INR in 4 week(s).     Patient verbalized understanding of dosing directions and information discussed. Dosing schedule given to patient including phone number, appointment date, and time. Progress note sent to referring office.    Electronically signed by Nica Koehler RPH on 11/7/24   For Pharmacy Admin Tracking Only    Intervention Detail: Refill(s) Provided  Total # of Interventions Recommended: 1  Total # of Interventions Accepted: 1  Time Spent (min): 15

## 2024-12-05 ENCOUNTER — ANTI-COAG VISIT (OUTPATIENT)
Dept: PHARMACY | Age: 39
End: 2024-12-05
Payer: COMMERCIAL

## 2024-12-05 DIAGNOSIS — G45.9 TIA (TRANSIENT ISCHEMIC ATTACK): ICD-10-CM

## 2024-12-05 DIAGNOSIS — I63.429 CEREBROVASCULAR ACCIDENT (CVA) DUE TO EMBOLISM OF ANTERIOR CEREBRAL ARTERY, UNSPECIFIED BLOOD VESSEL LATERALITY (HCC): Primary | ICD-10-CM

## 2024-12-05 LAB
INTERNATIONAL NORMALIZATION RATIO, POC: 2.9
POC INR: 2.9 (ref 0.9–1.2)
PROTHROMBIN TIME, POC: 35.4
PROTHROMBIN TIME, POC: 35.4 SEC (ref 10–14.3)

## 2024-12-05 PROCEDURE — 99212 OFFICE O/P EST SF 10 MIN: CPT

## 2024-12-05 PROCEDURE — 85610 PROTHROMBIN TIME: CPT

## 2024-12-05 NOTE — PROGRESS NOTES
Medication Management Service  [facility]  [phone]    Visit Date: 12/5/2024   Subjective:       Shane Treadwell is a 39 y.o. male who presents to clinic today for anticoagulation monitoring and adjustment.    Patient seen in clinic for warfarin management due to  Indication:   CVA and TIA.   INR goal: of  2.0-2.5 .  Duration of therapy: indefinite.    Patient reports the following:   Adherent with regimen  Missed or extra doses:  None   Bleeding or thromboembolic side effects:  None  Significant medication changes:  None  Significant dietary changes: None  Significant alcohol or tobacco changes: None  Significant recent illness, disease state changes, or hospitalization:  None  Upcoming surgeries or procedures:  None  Falls: None           Assessment and Plan     PT/INR done in office per protocol.   INR today is 2.9, supratherapeutic.          Plan: Take warfarin 2.5mg today then  will continue current regimen of warfarin 5mg daily except 7.5mg on Mondays.     Recheck INR in 2.5 week(s).     Patient verbalized understanding of dosing directions and information discussed. Dosing schedule given to patient including phone number, appointment date, and time. Progress note sent to referring office.    Electronically signed by Nica Koehler RPH on 12/5/24     For Pharmacy Admin Tracking Only    Intervention Detail: Dose Adjustment: 1, reason: Therapy De-escalation  Total # of Interventions Recommended: 1  Total # of Interventions Accepted: 1  Time Spent (min): 15

## 2024-12-19 RX ORDER — WARFARIN SODIUM 5 MG/1
TABLET ORAL
Qty: 98 TABLET | Refills: 0 | Status: SHIPPED | OUTPATIENT
Start: 2024-12-19

## 2024-12-23 ENCOUNTER — ANTI-COAG VISIT (OUTPATIENT)
Dept: PHARMACY | Age: 39
End: 2024-12-23
Payer: COMMERCIAL

## 2024-12-23 DIAGNOSIS — G45.9 TIA (TRANSIENT ISCHEMIC ATTACK): ICD-10-CM

## 2024-12-23 DIAGNOSIS — I63.429 CEREBROVASCULAR ACCIDENT (CVA) DUE TO EMBOLISM OF ANTERIOR CEREBRAL ARTERY, UNSPECIFIED BLOOD VESSEL LATERALITY (HCC): Primary | ICD-10-CM

## 2024-12-23 LAB
INTERNATIONAL NORMALIZATION RATIO, POC: 2.6
POC INR: 2.6 (ref 0.9–1.2)
PROTHROMBIN TIME, POC: 31.5
PROTHROMBIN TIME, POC: 31.5 SEC (ref 10–14.3)

## 2024-12-23 PROCEDURE — 85610 PROTHROMBIN TIME: CPT

## 2024-12-23 PROCEDURE — 99212 OFFICE O/P EST SF 10 MIN: CPT

## 2024-12-23 NOTE — PROGRESS NOTES
Medication Management Service  [facility]  [phone]    Visit Date: 12/23/2024   Subjective:       Shane Treadwell is a 39 y.o. male who presents to clinic today for anticoagulation monitoring and adjustment.    Patient seen in clinic for warfarin management due to  Indication:   CVA and TIA.   INR goal: of 2.0-2.5  Duration of therapy: indefinite.    Patient reports the following:   Adherent with regimen  Missed or extra doses:  None   Bleeding or thromboembolic side effects:  None  Significant medication changes:  None  Significant dietary changes: None  Significant alcohol or tobacco changes: None  Significant recent illness, disease state changes, or hospitalization:  None  Upcoming surgeries or procedures:  None  Falls: None           Assessment and Plan     PT/INR done in office per protocol.   INR today is 2.6, therapeutic.        Has been taking 5mg daily since last visit. Will continue with 5mg daily  Plan:  Decrease weekly dose ~7% to warfarin 5mg daily  Recheck INR in 3 week(s).     Patient verbalized understanding of dosing directions and information discussed. Dosing schedule given to patient including phone number, appointment date, and time. Progress note sent to referring office.    Electronically signed by Nica Koehler RPH on 12/23/24     For Pharmacy Admin Tracking Only    Intervention Detail: Dose Adjustment: 1, reason: Therapy De-escalation  Total # of Interventions Recommended: 1  Total # of Interventions Accepted: 1  Time Spent (min): 15

## 2025-01-13 ENCOUNTER — ANTI-COAG VISIT (OUTPATIENT)
Dept: PHARMACY | Age: 40
End: 2025-01-13
Payer: COMMERCIAL

## 2025-01-13 DIAGNOSIS — G45.9 TIA (TRANSIENT ISCHEMIC ATTACK): ICD-10-CM

## 2025-01-13 DIAGNOSIS — I63.429 CEREBROVASCULAR ACCIDENT (CVA) DUE TO EMBOLISM OF ANTERIOR CEREBRAL ARTERY, UNSPECIFIED BLOOD VESSEL LATERALITY (HCC): Primary | ICD-10-CM

## 2025-01-13 LAB
INTERNATIONAL NORMALIZATION RATIO, POC: 1.3
POC INR: 1.3 (ref 0.9–1.2)
PROTHROMBIN TIME, POC: 15.9
PROTHROMBIN TIME, POC: 15.9 SEC (ref 10–14.3)

## 2025-01-13 PROCEDURE — 99212 OFFICE O/P EST SF 10 MIN: CPT

## 2025-01-13 PROCEDURE — 85610 PROTHROMBIN TIME: CPT

## 2025-01-13 NOTE — PROGRESS NOTES
Medication Management Service  [facility]  [phone]    Visit Date: 1/13/2025   Subjective:       Shane Treadwell is a 39 y.o. male who presents to clinic today for anticoagulation monitoring and adjustment.    Patient seen in clinic for warfarin management due to  Indication:   CVA and TIA.   INR goal: of  2.0-2.5 .  Duration of therapy: indefinite.    Patient reports the following:   Adherent with regimen  Missed or extra doses:  missed dose(s) over a week ago  Bleeding or thromboembolic side effects:  None  Significant medication changes:  None  Significant dietary changes: None  Significant alcohol or tobacco changes: some on New Years  Significant recent illness, disease state changes, or hospitalization:  None  Upcoming surgeries or procedures:  None  Falls: None           Assessment and Plan     PT/INR done in office per protocol.   INR today is 1.3, subtherapeutic.        Reports has been taking for last week, but missed some doses before that. Was up late for work  Plan:  Take warfarin 7.5mg daily for 2 days then will continue current regimen of warfarin 5mg daily.     Recheck INR in 1 week(s).     Patient verbalized understanding of dosing directions and information discussed. Dosing schedule given to patient including phone number, appointment date, and time. Progress note sent to referring office.    Electronically signed by Nica Koehler RPH on 1/13/25     For Pharmacy Admin Tracking Only    Intervention Detail: Dose Adjustment: 1, reason: Therapy Optimization  Total # of Interventions Recommended: 1  Total # of Interventions Accepted: 1  Time Spent (min): 15

## 2025-01-20 ENCOUNTER — TELEPHONE (OUTPATIENT)
Dept: PHARMACY | Age: 40
End: 2025-01-20

## 2025-01-20 ENCOUNTER — ANTI-COAG VISIT (OUTPATIENT)
Dept: PHARMACY | Age: 40
End: 2025-01-20
Payer: COMMERCIAL

## 2025-01-20 DIAGNOSIS — I63.429 CEREBROVASCULAR ACCIDENT (CVA) DUE TO EMBOLISM OF ANTERIOR CEREBRAL ARTERY, UNSPECIFIED BLOOD VESSEL LATERALITY (HCC): Primary | ICD-10-CM

## 2025-01-20 DIAGNOSIS — G45.9 TIA (TRANSIENT ISCHEMIC ATTACK): ICD-10-CM

## 2025-01-20 LAB
INTERNATIONAL NORMALIZATION RATIO, POC: 1.7
POC INR: 1.7 (ref 0.9–1.2)
PROTHROMBIN TIME, POC: 20.6
PROTHROMBIN TIME, POC: 20.6 SEC (ref 10–14.3)

## 2025-01-20 PROCEDURE — 99212 OFFICE O/P EST SF 10 MIN: CPT

## 2025-01-20 PROCEDURE — 85610 PROTHROMBIN TIME: CPT

## 2025-01-20 NOTE — PROGRESS NOTES
Medication Management Service  [facility]  [phone]    Visit Date: 1/20/2025   Subjective:       Shane Treadwell is a 39 y.o. male who presents to clinic today for anticoagulation monitoring and adjustment.    Patient seen in clinic for warfarin management due to  Indication:   CVA and TIA.   INR goal: of  2.0-2.5 .  Duration of therapy: indefinite.    Patient reports the following:   Adherent with regimen  Missed or extra doses:  None   Bleeding or thromboembolic side effects:  None  Significant medication changes:  None  Significant dietary changes: None  Significant alcohol or tobacco changes: None  Significant recent illness, disease state changes, or hospitalization:  None  Upcoming surgeries or procedures:  None  Falls: None           Assessment and Plan     PT/INR done in office per protocol.   INR today is 1.7, subtherapeutic.          Plan: Increase weekly dose ~7% to warfarin 5mg daily except 7.5mg on Mondays.     Recheck INR in 2.5 week(s).  (He will be travelling to [place])    Patient verbalized understanding of dosing directions and information discussed. Dosing schedule given to patient including phone number, appointment date, and time. Progress note sent to referring office.    Electronically signed by Nica Koehler RPH on 1/20/25     For Pharmacy Admin Tracking Only    Intervention Detail: Dose Adjustment: 1, reason: Therapy Optimization  Total # of Interventions Recommended: 1  Total # of Interventions Accepted: 1  Time Spent (min): 15

## 2025-01-20 NOTE — TELEPHONE ENCOUNTER
Patient called to see if he could come in earlier today.  There was no available appointments earlier today- offered to reschedule a different day.      Patient stated he will still try to come in today and if he doesn't make appointment time today he will call and reschedule.    For Pharmacy Admin Tracking Only    Time Spent (min): 5

## 2025-02-04 PROCEDURE — 93296 REM INTERROG EVL PM/IDS: CPT | Performed by: INTERNAL MEDICINE

## 2025-02-04 PROCEDURE — 93295 DEV INTERROG REMOTE 1/2/MLT: CPT | Performed by: INTERNAL MEDICINE

## 2025-02-07 RX ORDER — LISINOPRIL 20 MG/1
20 TABLET ORAL DAILY
Qty: 90 TABLET | Refills: 1 | Status: SHIPPED | OUTPATIENT
Start: 2025-02-07

## 2025-02-10 ENCOUNTER — TELEPHONE (OUTPATIENT)
Dept: PHARMACY | Age: 40
End: 2025-02-10

## 2025-02-10 ENCOUNTER — ANTI-COAG VISIT (OUTPATIENT)
Dept: PHARMACY | Age: 40
End: 2025-02-10
Payer: COMMERCIAL

## 2025-02-10 DIAGNOSIS — G45.9 TIA (TRANSIENT ISCHEMIC ATTACK): ICD-10-CM

## 2025-02-10 DIAGNOSIS — I63.429 CEREBROVASCULAR ACCIDENT (CVA) DUE TO EMBOLISM OF ANTERIOR CEREBRAL ARTERY, UNSPECIFIED BLOOD VESSEL LATERALITY (HCC): Primary | ICD-10-CM

## 2025-02-10 LAB
INTERNATIONAL NORMALIZATION RATIO, POC: 1.6
POC INR: 1.6 (ref 0.9–1.2)
PROTHROMBIN TIME, POC: 19.5
PROTHROMBIN TIME, POC: 19.5 SEC (ref 10–14.3)

## 2025-02-10 PROCEDURE — 85610 PROTHROMBIN TIME: CPT

## 2025-02-10 PROCEDURE — 99212 OFFICE O/P EST SF 10 MIN: CPT

## 2025-02-10 NOTE — PROGRESS NOTES
Medication Management Service  [facility]  [phone]    Visit Date: 2/10/2025   Subjective:       Shane Treadwell is a 39 y.o. male who presents to clinic today for anticoagulation monitoring and adjustment.    Patient seen in clinic for warfarin management due to  Indication:   CVA and TIA.   INR goal: of  2.0 - 2.5 .  Duration of therapy: indefinite.    Patient reports the following:   Adherent with regimen  Missed or extra doses: Patient states he took warfarin 5mg daily except 7.5mg Mondays and Tuesdays not just on Mondays as instructed  Bleeding or thromboembolic side effects:  None  Significant medication changes:  None  Significant dietary changes: None  Significant alcohol or tobacco changes: None  Significant recent illness, disease state changes, or hospitalization:  None  Upcoming surgeries or procedures:  None  Falls: None           Assessment and Plan     PT/INR done in office per protocol.   INR today is 1.6, subtherapeutic despite previous dose increase and patient taking additional doses.          Plan: Take warfarin 10mg x 1 then 7.5mg x 1 then increase dose by ~6% to warfarin 5mg daily except 7.5mg Mondays, Wednesdays, and Fridays.     Recheck INR in 3 days.     Patient verbalized understanding of dosing directions and information discussed. Dosing schedule given to patient including phone number, appointment date, and time. Progress note sent to referring office.    Electronically signed by Jenni Mccloud RPH on 2/10/25     For Pharmacy Admin Tracking Only    Intervention Detail: Dose Adjustment: 1, reason: Therapy Optimization  Total # of Interventions Recommended: 1  Total # of Interventions Accepted: 1  Time Spent (min): 15

## 2025-02-10 NOTE — TELEPHONE ENCOUNTER
Patient states he missed last appointment. Requested to schedule. Scheduled for today.    For Pharmacy Admin Tracking Only    Time Spent (min): 5

## 2025-02-13 ENCOUNTER — ANTI-COAG VISIT (OUTPATIENT)
Dept: PHARMACY | Age: 40
End: 2025-02-13
Payer: COMMERCIAL

## 2025-02-13 DIAGNOSIS — I63.429 CEREBROVASCULAR ACCIDENT (CVA) DUE TO EMBOLISM OF ANTERIOR CEREBRAL ARTERY, UNSPECIFIED BLOOD VESSEL LATERALITY (HCC): Primary | ICD-10-CM

## 2025-02-13 DIAGNOSIS — G45.9 TIA (TRANSIENT ISCHEMIC ATTACK): ICD-10-CM

## 2025-02-13 LAB
INTERNATIONAL NORMALIZATION RATIO, POC: 2.8
POC INR: 2.8 (ref 0.9–1.2)
PROTHROMBIN TIME, POC: 33.5
PROTHROMBIN TIME, POC: 33.5 SEC (ref 10–14.3)

## 2025-02-13 PROCEDURE — 85610 PROTHROMBIN TIME: CPT

## 2025-02-13 PROCEDURE — 99211 OFF/OP EST MAY X REQ PHY/QHP: CPT

## 2025-02-13 NOTE — PROGRESS NOTES
Medication Management Service  [facility]  [phone]    Visit Date: 2/13/2025   Subjective:       Shane Treadwell is a 39 y.o. male who presents to clinic today for anticoagulation monitoring and adjustment.    Patient seen in clinic for warfarin management due to  Indication:   CVA and TIA.   INR goal: of  2.0-2.5 .  Duration of therapy: indefinite.    Patient reports the following:   Adherent with regimen  Missed or extra doses:  None   Bleeding or thromboembolic side effects:  None  Significant medication changes:  None  Significant dietary changes: None  Significant alcohol or tobacco changes: None  Significant recent illness, disease state changes, or hospitalization:  None  Upcoming surgeries or procedures:  None  Falls: None           Assessment and Plan     PT/INR done in office per protocol.   INR today is 2.8, therapeutic, but above goal range.   INR rising quickly after 10mg booster dose.   Plan:  Will continue current regimen of warfarin 5mg daily except 7.5mg on MWF.     Recheck INR in 2 week(s).     Patient verbalized understanding of dosing directions and information discussed. Dosing schedule given to patient including phone number, appointment date, and time. Progress note sent to referring office.    Electronically signed by Nica Koehler RPH on 2/13/25     For Pharmacy Admin Tracking Only    Intervention Detail:   Total # of Interventions Recommended:   Total # of Interventions Accepted:   Time Spent (min): 15

## 2025-02-27 ENCOUNTER — TELEPHONE (OUTPATIENT)
Dept: PHARMACY | Age: 40
End: 2025-02-27

## 2025-02-27 NOTE — TELEPHONE ENCOUNTER
Patient called to reschedule appointment because he got held up at work.  Rescheduled him for 3/6.      For Pharmacy Admin Tracking Only    Time Spent (min): 5

## 2025-03-06 ENCOUNTER — ANTI-COAG VISIT (OUTPATIENT)
Dept: PHARMACY | Age: 40
End: 2025-03-06
Payer: COMMERCIAL

## 2025-03-06 DIAGNOSIS — G45.9 TIA (TRANSIENT ISCHEMIC ATTACK): ICD-10-CM

## 2025-03-06 DIAGNOSIS — I63.429 CEREBROVASCULAR ACCIDENT (CVA) DUE TO EMBOLISM OF ANTERIOR CEREBRAL ARTERY, UNSPECIFIED BLOOD VESSEL LATERALITY (HCC): Primary | ICD-10-CM

## 2025-03-06 LAB
INTERNATIONAL NORMALIZATION RATIO, POC: 2.8
POC INR: 2.8 (ref 0.9–1.2)
PROTHROMBIN TIME, POC: 34.1
PROTHROMBIN TIME, POC: 34.1 SEC (ref 10–14.3)

## 2025-03-06 PROCEDURE — 99212 OFFICE O/P EST SF 10 MIN: CPT

## 2025-03-06 PROCEDURE — 85610 PROTHROMBIN TIME: CPT

## 2025-03-06 NOTE — PROGRESS NOTES
Medication Management Service  [facility]  [phone]    Visit Date: 3/6/2025   Subjective:       Shane Treadwell is a 39 y.o. male who presents to clinic today for anticoagulation monitoring and adjustment.    Patient seen in clinic for warfarin management due to  Indication:   CVA and TIA.   INR goal: of  2.0-2.5 .  Duration of therapy: indefinite.    Patient reports the following:   Adherent with regimen  Missed or extra doses:  None   Bleeding or thromboembolic side effects:  None  Significant medication changes:  None  Significant dietary changes: None  Significant alcohol or tobacco changes: None  Significant recent illness, disease state changes, or hospitalization:  None  Upcoming surgeries or procedures:  None  Falls: None           Assessment and Plan     PT/INR done in office per protocol.   INR today is 2.8, therapeutic but above goal range.          Plan:  Take 5mg x1 then decrease dose by ~6% to warfarin 5mg daily except 7.5mg on Tuesdays and Thursdays.     Recheck INR in 2 week(s).     Patient verbalized understanding of dosing directions and information discussed. Dosing schedule given to patient including phone number, appointment date, and time. Progress note sent to referring office.    Electronically signed by Houston Hurst RPH on 3/6/25     For Pharmacy Admin Tracking Only    Intervention Detail: Dose Adjustment: 1, reason: Therapy De-escalation  Total # of Interventions Recommended: 1  Total # of Interventions Accepted: 1  Time Spent (min): 15

## 2025-03-10 ENCOUNTER — OFFICE VISIT (OUTPATIENT)
Dept: CARDIOLOGY CLINIC | Age: 40
End: 2025-03-10
Payer: COMMERCIAL

## 2025-03-10 VITALS
HEART RATE: 80 BPM | BODY MASS INDEX: 27.89 KG/M2 | WEIGHT: 194.8 LBS | DIASTOLIC BLOOD PRESSURE: 80 MMHG | HEIGHT: 70 IN | SYSTOLIC BLOOD PRESSURE: 122 MMHG

## 2025-03-10 DIAGNOSIS — I25.5 ISCHEMIC CARDIOMYOPATHY: ICD-10-CM

## 2025-03-10 DIAGNOSIS — Z95.810 S/P ICD (INTERNAL CARDIAC DEFIBRILLATOR) PROCEDURE: ICD-10-CM

## 2025-03-10 DIAGNOSIS — G45.9 TIA (TRANSIENT ISCHEMIC ATTACK): ICD-10-CM

## 2025-03-10 DIAGNOSIS — R47.01 APHASIA: ICD-10-CM

## 2025-03-10 DIAGNOSIS — I63.429 CEREBROVASCULAR ACCIDENT (CVA) DUE TO EMBOLISM OF ANTERIOR CEREBRAL ARTERY, UNSPECIFIED BLOOD VESSEL LATERALITY (HCC): ICD-10-CM

## 2025-03-10 DIAGNOSIS — Z79.01 ANTICOAGULATED ON COUMADIN: Primary | ICD-10-CM

## 2025-03-10 PROCEDURE — 99214 OFFICE O/P EST MOD 30 MIN: CPT | Performed by: INTERNAL MEDICINE

## 2025-03-10 NOTE — PROGRESS NOTES
CARDIOLOGY  NOTE    Chief Complaint: CHF/ cardiomyopathy /ASCVD    HPI:   Shane is a 39 y.o. year old who has Past medical history as noted below.Shane is taking metoprolol and lisinopril .  HE is compliant with Coumadin and Plavix  Repeat echo in march 2022 showed EF of 20-25 % He had ICD placed in May 2022  HE is planning to get  soon    He was hospitalized in Aug 2021  after CVA causing aphasia but is recovered from his CVA.  During work-up he was also found to have severe cardiomyopathy with ejection fraction of 20% with akinetic apex and septal wall.  Cardiac cath revealed occluded LAD.  Viability study shows infarcted apex but viable anterior wall. Therefore we proceed with LAD intervention in Dec 2021 unfortunately postintervention EF did not improve hence he got an ICD  HE is going to se Dr Misha Abel for PCP   He is tolerating the meds well HE is not dizzy or lightheaded .   He says his breathing is okay he was was calm very compliant on guideline recommended medical therapy up till now .  he has no ankle swelling or shortness of breath he was started on Coumadin due to concerns for possible embolic event from dilated cardiomyopathy.  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        Current Outpatient Medications   Medication Sig Dispense Refill    lisinopril (PRINIVIL;ZESTRIL) 20 MG tablet Take 1 tablet by mouth daily HOLD FOR SBP LESS THAN 90 AND HR LESS THAN 60 90 tablet 1    warfarin (COUMADIN) 5 MG tablet TAKE 1 TABLET BY MOUTH DAILY EXCEPT TAKE 1 AND 1/2 TABLETS ON MONDAYS OR AS DIRECTED 98 tablet 0    atorvastatin (LIPITOR) 80 MG tablet Take 1 tablet by mouth daily 90 tablet 3    metoprolol succinate (TOPROL XL) 100 MG extended release tablet Take 1 tablet by mouth daily Do not crush or chew. HOLD FOR SBP LESS THAN 90 AND HR LESS THAN 60  Take 150 mg 90 tablet 3    clopidogrel (PLAVIX) 75 MG tablet Take 1 tablet by mouth daily 90 tablet 3

## 2025-03-10 NOTE — PROGRESS NOTES
CLINICAL STAFF DOCUMENTATION    Dr. Hernandez Treadwell  1985  [id number]    Have you had any Chest Pain recently? - No  Have you had any Shortness of Breath - No  Have you had any palpitations recently? - No  Marysol you have any edema - swelling in No      Do you have a surgery or procedure scheduled in the near future - No    Do use tobacco products? - no  Do you drink alcohol? - no  Do you use any illicit drugs? - No  Caffeine? - Yes  How much caffeine? .2  bottles       Check medication list thoroughly!!! AND RECONCILE OUTSIDE MEDICATIONS  If dose has changed change the entire order not just the MG  BE SURE TO ASK PATIENT IF THEY NEED MEDICATION REFILLS  Verify Pharmacy and update if incorrect    Add to every patient's \"wrap up\" the following dot phrase AFTERVISITCARDIOHEARTHOUSE and ensure we explain this to our patients

## 2025-03-14 RX ORDER — CLOPIDOGREL BISULFATE 75 MG/1
75 TABLET ORAL DAILY
Qty: 90 TABLET | Refills: 3 | Status: SHIPPED | OUTPATIENT
Start: 2025-03-14

## 2025-03-14 RX ORDER — ATORVASTATIN CALCIUM 80 MG/1
80 TABLET, FILM COATED ORAL DAILY
Qty: 90 TABLET | Refills: 3 | Status: SHIPPED | OUTPATIENT
Start: 2025-03-14

## 2025-03-20 ENCOUNTER — APPOINTMENT (OUTPATIENT)
Dept: PHARMACY | Age: 40
End: 2025-03-20
Payer: COMMERCIAL

## 2025-03-27 ENCOUNTER — ANTI-COAG VISIT (OUTPATIENT)
Dept: PHARMACY | Age: 40
End: 2025-03-27
Payer: COMMERCIAL

## 2025-03-27 DIAGNOSIS — I63.429 CEREBROVASCULAR ACCIDENT (CVA) DUE TO EMBOLISM OF ANTERIOR CEREBRAL ARTERY, UNSPECIFIED BLOOD VESSEL LATERALITY (HCC): Primary | ICD-10-CM

## 2025-03-27 DIAGNOSIS — G45.9 TIA (TRANSIENT ISCHEMIC ATTACK): ICD-10-CM

## 2025-03-27 LAB
INTERNATIONAL NORMALIZATION RATIO, POC: 2.2
POC INR: 2.2 (ref 0.9–1.2)
PROTHROMBIN TIME, POC: 26
PROTHROMBIN TIME, POC: 26 SEC (ref 10–14.3)

## 2025-03-27 PROCEDURE — 99211 OFF/OP EST MAY X REQ PHY/QHP: CPT

## 2025-03-27 PROCEDURE — 85610 PROTHROMBIN TIME: CPT

## 2025-03-27 NOTE — PROGRESS NOTES
Medication Management Service  [facility]  [phone]    Visit Date: 3/27/2025   Subjective:       Shane Treadwell is a 39 y.o. male who presents to clinic today for anticoagulation monitoring and adjustment.    Patient seen in clinic for warfarin management due to  Indication:   CVA and TIA.   INR goal: of  2.0-2.5 .  Duration of therapy: indefinite.    Patient reports the following:   Adherent with regimen  Missed or extra doses:  None   Bleeding or thromboembolic side effects:  None  Significant medication changes:  None  Significant dietary changes: None  Significant alcohol or tobacco changes: None  Significant recent illness, disease state changes, or hospitalization:  None  Upcoming surgeries or procedures:  None  Falls: None           Assessment and Plan     PT/INR done in office per protocol.   INR today is 2.2, therapeutic.          Plan:  Will continue current regimen of warfarin 5mg daily excpet 2.5mg on Tuesdays and Thursdays.     Recheck INR in 2 week(s).     Patient verbalized understanding of dosing directions and information discussed. Dosing schedule given to patient including phone number, appointment date, and time. Progress note sent to referring office.    Electronically signed by Houston Hurst RPH on 3/27/25     For Pharmacy Admin Tracking Only    Total # of Interventions Recommended: 0  Total # of Interventions Accepted: 0  Time Spent (min): 15

## 2025-04-10 ENCOUNTER — TELEPHONE (OUTPATIENT)
Dept: PHARMACY | Age: 40
End: 2025-04-10

## 2025-04-10 NOTE — TELEPHONE ENCOUNTER
Patient called and will not be able to make it today. Rescheduled for 4/17.  Patient did say he missed his 2.5mg dose on Tuesday.  Instucted him to not take any extra at this time and we will follow up at next clinic date.    For Pharmacy Admin Tracking Only    Time Spent (min): 5

## 2025-04-17 ENCOUNTER — ANTI-COAG VISIT (OUTPATIENT)
Dept: PHARMACY | Age: 40
End: 2025-04-17
Payer: COMMERCIAL

## 2025-04-17 DIAGNOSIS — G45.9 TIA (TRANSIENT ISCHEMIC ATTACK): ICD-10-CM

## 2025-04-17 DIAGNOSIS — I63.429 CEREBROVASCULAR ACCIDENT (CVA) DUE TO EMBOLISM OF ANTERIOR CEREBRAL ARTERY, UNSPECIFIED BLOOD VESSEL LATERALITY (HCC): Primary | ICD-10-CM

## 2025-04-17 LAB
INTERNATIONAL NORMALIZATION RATIO, POC: 1.8
POC INR: 1.8 (ref 0.9–1.2)
PROTHROMBIN TIME, POC: 21.2
PROTHROMBIN TIME, POC: 21.2 SEC (ref 10–14.3)

## 2025-04-17 PROCEDURE — 99211 OFF/OP EST MAY X REQ PHY/QHP: CPT

## 2025-04-17 PROCEDURE — 85610 PROTHROMBIN TIME: CPT

## 2025-04-17 NOTE — PROGRESS NOTES
Medication Management Service  [facility]  [phone]    Visit Date: 4/17/2025   Subjective:       Shane Treadwell is a 39 y.o. male who presents to clinic today for anticoagulation monitoring and adjustment.    Patient seen in clinic for warfarin management due to  Indication:   CVA and TIA.   INR goal: of  2.0-2.5 .  Duration of therapy: indefinite.    Patient reports the following:   Adherent with regimen  Missed or extra doses:  missed x1 on 4/11  Bleeding or thromboembolic side effects:  None  Significant medication changes:  None  Significant dietary changes: None  Significant alcohol or tobacco changes: None  Significant recent illness, disease state changes, or hospitalization:  None  Upcoming surgeries or procedures:  None  Falls: None           Assessment and Plan     PT/INR done in office per protocol.   INR today is 1.8, below goal, likely due to missed dose.          Plan:  Will continue current regimen of warfarin 5mg daily except 7.5mg on Tuesdays and Thursdays.     Recheck INR in 2 week(s).     Patient verbalized understanding of dosing directions and information discussed. Dosing schedule given to patient including phone number, appointment date, and time. Progress note sent to referring office.    Electronically signed by Houston Hurst RPH on 4/17/25     For Pharmacy Admin Tracking Only    Total # of Interventions Recommended: 0  Total # of Interventions Accepted: 0  Time Spent (min): 15

## 2025-05-01 ENCOUNTER — ANTI-COAG VISIT (OUTPATIENT)
Dept: PHARMACY | Age: 40
End: 2025-05-01
Payer: COMMERCIAL

## 2025-05-01 DIAGNOSIS — I63.429 CEREBROVASCULAR ACCIDENT (CVA) DUE TO EMBOLISM OF ANTERIOR CEREBRAL ARTERY, UNSPECIFIED BLOOD VESSEL LATERALITY (HCC): Primary | ICD-10-CM

## 2025-05-01 DIAGNOSIS — G45.9 TIA (TRANSIENT ISCHEMIC ATTACK): ICD-10-CM

## 2025-05-01 LAB
INTERNATIONAL NORMALIZATION RATIO, POC: 2.1
POC INR: 2.1 (ref 0.9–1.2)
PROTHROMBIN TIME, POC: 24.8
PROTHROMBIN TIME, POC: 24.8 SEC (ref 10–14.3)

## 2025-05-01 PROCEDURE — 85610 PROTHROMBIN TIME: CPT

## 2025-05-01 PROCEDURE — 99211 OFF/OP EST MAY X REQ PHY/QHP: CPT

## 2025-05-01 NOTE — PROGRESS NOTES
Medication Management Service  [facility]  [phone]    Visit Date: 5/1/2025   Subjective:       Shane Treadwell is a 39 y.o. male who presents to clinic today for anticoagulation monitoring and adjustment.    Patient seen in clinic for warfarin management due to  Indication:   CVA and TIA.   INR goal: of  2.0-2.5 .  Duration of therapy: indefinite.    Patient reports the following:   Adherent with regimen  Missed or extra doses: missed a dose- unsure exactly which day   Bleeding or thromboembolic side effects:  None  Significant medication changes:  None  Significant dietary changes: None  Significant alcohol or tobacco changes: None  Significant recent illness, disease state changes, or hospitalization:  None  Upcoming surgeries or procedures:  None  Falls: None           Assessment and Plan     PT/INR done in office per protocol.   INR today is 2.1, therapeutic.          Plan:  Will continue current regimen of warfarin 5mg daily except 7.5mg on Tuesdays and Thursdays.     Recheck INR in 2 week(s).     Patient verbalized understanding of dosing directions and information discussed. Dosing schedule given to patient including phone number, appointment date, and time. Progress note sent to referring office.    Electronically signed by Nica Koehler RPH on 5/1/25     For Pharmacy Admin Tracking Only    Intervention Detail:   Total # of Interventions Recommended:   Total # of Interventions Accepted:   Time Spent (min): 15

## 2025-05-09 RX ORDER — WARFARIN SODIUM 5 MG/1
TABLET ORAL
Qty: 98 TABLET | Refills: 0 | Status: SHIPPED | OUTPATIENT
Start: 2025-05-09

## 2025-05-15 ENCOUNTER — TELEPHONE (OUTPATIENT)
Dept: PHARMACY | Age: 40
End: 2025-05-15

## 2025-05-15 NOTE — TELEPHONE ENCOUNTER
No show. Called patient. Scheduled for 5/22.     For Pharmacy Admin Tracking Only    Intervention Detail:   Total # of Interventions Recommended:   Total # of Interventions Accepted:   Time Spent (min): 5

## 2025-05-22 ENCOUNTER — TELEPHONE (OUTPATIENT)
Dept: PHARMACY | Age: 40
End: 2025-05-22

## 2025-05-22 DIAGNOSIS — G45.9 TIA (TRANSIENT ISCHEMIC ATTACK): ICD-10-CM

## 2025-05-22 DIAGNOSIS — I63.429 CEREBROVASCULAR ACCIDENT (CVA) DUE TO EMBOLISM OF ANTERIOR CEREBRAL ARTERY, UNSPECIFIED BLOOD VESSEL LATERALITY (HCC): Primary | ICD-10-CM

## 2025-05-22 NOTE — TELEPHONE ENCOUNTER
Patient no show to appointment today. Called patient. Scheduled for 6/5.    For Pharmacy Admin Tracking Only    Time Spent (min): 5

## 2025-05-29 ENCOUNTER — ANTI-COAG VISIT (OUTPATIENT)
Dept: PHARMACY | Age: 40
End: 2025-05-29
Payer: COMMERCIAL

## 2025-05-29 DIAGNOSIS — I63.429 CEREBROVASCULAR ACCIDENT (CVA) DUE TO EMBOLISM OF ANTERIOR CEREBRAL ARTERY, UNSPECIFIED BLOOD VESSEL LATERALITY (HCC): Primary | ICD-10-CM

## 2025-05-29 DIAGNOSIS — G45.9 TIA (TRANSIENT ISCHEMIC ATTACK): ICD-10-CM

## 2025-05-29 LAB
INTERNATIONAL NORMALIZATION RATIO, POC: 3.3
POC INR: 3.3 (ref 0.9–1.2)
PROTHROMBIN TIME, POC: 0

## 2025-05-29 PROCEDURE — 85610 PROTHROMBIN TIME: CPT

## 2025-05-29 PROCEDURE — 99212 OFFICE O/P EST SF 10 MIN: CPT

## 2025-05-29 NOTE — PROGRESS NOTES
Medication Management Service  [facility]  [phone]    Visit Date: 5/29/2025   Subjective:       Shane Treadwell is a 39 y.o. male who presents to clinic today for anticoagulation monitoring and adjustment.    Patient seen in clinic for warfarin management due to  Indication:   CVA and TIA.   INR goal: of  2.0-2.5 .  Duration of therapy: indefinite.    Patient reports the following:   Adherent with regimen  Missed or extra doses:  None   Bleeding or thromboembolic side effects:  None  Significant medication changes:  None  Significant dietary changes: None  Significant alcohol or tobacco changes: None  Significant recent illness, disease state changes, or hospitalization: Recent mild stomach pain that goes away when laying down/relaxed and he can move it around when applying pressure in that spot. No constipation, diarrhea or nausea.  Suggested trying gas relief tablets and seeing if that helps.  If persists longer than a few days, suggested he make an appt with PCP.   Upcoming surgeries or procedures:  None  Falls: None           Assessment and Plan     PT/INR done in office per protocol.   INR today is 3.3, supratherapeutic.   Patient advised of increased risk of bleeding at current INR. Advised patient to avoid activities that increase risk of falling or cutting him/herself. Advised patient to go to ER for fall, head injury, or bleeding. Patient voiced understanding.           Plan:  Take 2.5mgx1 then continue current regimen of warfarin 5mg daily except 7.5mg on Tuesdays and Thursdays.     Recheck INR in 1 week(s).     Patient verbalized understanding of dosing directions and information discussed. Dosing schedule given to patient including phone number, appointment date, and time. Progress note sent to referring office.    Electronically signed by Houston Hurst RPH on 5/29/25     For Pharmacy Admin Tracking Only    Intervention Detail: Dose Adjustment: 1, reason: Therapy

## 2025-06-05 ENCOUNTER — TELEPHONE (OUTPATIENT)
Dept: PHARMACY | Age: 40
End: 2025-06-05

## 2025-06-05 NOTE — TELEPHONE ENCOUNTER
Patient called to say he got held up at work and would have to reschedule. Was able to reschedule for 6/12.    For Pharmacy Admin Tracking Only    Time Spent (min): 5

## 2025-06-16 ENCOUNTER — ANTI-COAG VISIT (OUTPATIENT)
Dept: PHARMACY | Age: 40
End: 2025-06-16
Payer: COMMERCIAL

## 2025-06-16 DIAGNOSIS — I63.429 CEREBROVASCULAR ACCIDENT (CVA) DUE TO EMBOLISM OF ANTERIOR CEREBRAL ARTERY, UNSPECIFIED BLOOD VESSEL LATERALITY (HCC): Primary | ICD-10-CM

## 2025-06-16 DIAGNOSIS — G45.9 TIA (TRANSIENT ISCHEMIC ATTACK): ICD-10-CM

## 2025-06-16 LAB
INTERNATIONAL NORMALIZATION RATIO, POC: 2
POC INR: 2 (ref 0.9–1.2)
PROTHROMBIN TIME, POC: 0

## 2025-06-16 PROCEDURE — 85610 PROTHROMBIN TIME: CPT

## 2025-06-16 PROCEDURE — 99211 OFF/OP EST MAY X REQ PHY/QHP: CPT

## 2025-06-16 NOTE — PROGRESS NOTES
Medication Management Service  [facility]  [phone]    Visit Date: 6/16/2025   Subjective:       Shane Treadwell is a 39 y.o. male who presents to clinic today for anticoagulation monitoring and adjustment.    Patient seen in clinic for warfarin management due to  Indication:   CVA and TIA.   INR goal: of 2.0-2.5.  Duration of therapy: indefinite.    Patient reports the following:   Adherent with regimen  Missed or extra doses:  None   Bleeding or thromboembolic side effects:  None  Significant medication changes:  None  Significant dietary changes: None  Significant alcohol or tobacco changes: None  Significant recent illness, disease state changes, or hospitalization:  None  Upcoming surgeries or procedures:  None  Falls: None           Assessment and Plan     PT/INR done in office per protocol.   INR today is 2.0, therapeutic.          Plan:  Will continue current regimen of warfarin 5mg daily except 7.5mg on Tuesdays and Thursdays.     Recheck INR in 4 week(s).     Patient verbalized understanding of dosing directions and information discussed. Dosing schedule given to patient including phone number, appointment date, and time. Progress note sent to referring office.    Electronically signed by Nica Koehler RPH on 6/16/25     For Pharmacy Admin Tracking Only    Intervention Detail:   Total # of Interventions Recommended:   Total # of Interventions Accepted:   Time Spent (min): 15

## 2025-07-14 ENCOUNTER — ANTI-COAG VISIT (OUTPATIENT)
Dept: PHARMACY | Age: 40
End: 2025-07-14
Payer: COMMERCIAL

## 2025-07-14 DIAGNOSIS — I63.429 CEREBROVASCULAR ACCIDENT (CVA) DUE TO EMBOLISM OF ANTERIOR CEREBRAL ARTERY, UNSPECIFIED BLOOD VESSEL LATERALITY (HCC): Primary | ICD-10-CM

## 2025-07-14 DIAGNOSIS — G45.9 TIA (TRANSIENT ISCHEMIC ATTACK): ICD-10-CM

## 2025-07-14 LAB
INTERNATIONAL NORMALIZATION RATIO, POC: 3
POC INR: 3 (ref 0.9–1.2)
PROTHROMBIN TIME, POC: 0

## 2025-07-14 PROCEDURE — 99212 OFFICE O/P EST SF 10 MIN: CPT

## 2025-07-14 PROCEDURE — 85610 PROTHROMBIN TIME: CPT

## 2025-07-14 NOTE — PROGRESS NOTES
Medication Management Service  [facility]  [phone]    Visit Date: 7/14/2025   Subjective:       Shane Treadwell is a 39 y.o. male who presents to clinic today for anticoagulation monitoring and adjustment.    Patient seen in clinic for warfarin management due to  Indication:   CVA and TIA.   INR goal: of 2.0-2.5.  Duration of therapy: indefinite.    Patient reports the following:   Adherent with regimen  Missed or extra doses:  None   Bleeding or thromboembolic side effects:  None  Significant medication changes:  None  Significant dietary changes: None  Significant alcohol or tobacco changes: None  Significant recent illness, disease state changes, or hospitalization:  None  Upcoming surgeries or procedures:  None  Falls: None           Assessment and Plan     PT/INR done in office per protocol.   INR today is 3.0, supratherapeutic, above his goal.   Plan:  Take warfarin 5mg today then decrease weekly dose ~6% to wararin  5mg daily except 7.5mg on Mondays      Recheck INR in 2-3 week(s)- he will call, possible travel.     Patient verbalized understanding of dosing directions and information discussed. Dosing schedule given to patient including phone number, appointment date, and time. Progress note sent to referring office.    Electronically signed by Nica Koehler RPH on 7/14/25     For Pharmacy Admin Tracking Only    Intervention Detail: Dose Adjustment: 1, reason: Therapy De-escalation  Total # of Interventions Recommended: 1  Total # of Interventions Accepted: 1  Time Spent (min): 15

## 2025-07-28 ENCOUNTER — ANTI-COAG VISIT (OUTPATIENT)
Dept: PHARMACY | Age: 40
End: 2025-07-28
Payer: COMMERCIAL

## 2025-07-28 DIAGNOSIS — I63.429 CEREBROVASCULAR ACCIDENT (CVA) DUE TO EMBOLISM OF ANTERIOR CEREBRAL ARTERY, UNSPECIFIED BLOOD VESSEL LATERALITY (HCC): Primary | ICD-10-CM

## 2025-07-28 DIAGNOSIS — G45.9 TIA (TRANSIENT ISCHEMIC ATTACK): ICD-10-CM

## 2025-07-28 LAB
INTERNATIONAL NORMALIZATION RATIO, POC: 1.9
POC INR: 1.9 (ref 0.9–1.2)
PROTHROMBIN TIME, POC: 0

## 2025-07-28 PROCEDURE — 99211 OFF/OP EST MAY X REQ PHY/QHP: CPT

## 2025-07-28 PROCEDURE — 85610 PROTHROMBIN TIME: CPT

## 2025-07-28 NOTE — PROGRESS NOTES
Medication Management Service  [facility]  [phone]    Visit Date: 7/28/2025   Subjective:       Shane Treadwell is a 39 y.o. male who presents to clinic today for anticoagulation monitoring and adjustment.    Patient seen in clinic for warfarin management due to  Indication:   CVA and TIA.   INR goal: of 2.0-2.5.  Duration of therapy: indefinite    Patient reports the following:   Adherent with regimen  Missed or extra doses:  missed dose Wednesday  Bleeding or thromboembolic side effects:  None  Significant medication changes:  None  Significant dietary changes: None  Significant alcohol or tobacco changes: None  Significant recent illness, disease state changes, or hospitalization:  None  Upcoming surgeries or procedures:  None  Falls: None           Assessment and Plan     PT/INR done in office per protocol.   INR today is 1.9, subtherapeutic, likely due to missed dose.     Plan:  Will continue current regimen of warfarin 5mg daily except 7.5mg on Mondays.     Recheck INR in 2 week(s).     Patient verbalized understanding of dosing directions and information discussed. Dosing schedule given to patient including phone number, appointment date, and time. Progress note sent to referring office.    Electronically signed by Nica Koehler RPH on 7/28/25     For Pharmacy Admin Tracking Only    Intervention Detail:   Total # of Interventions Recommended:   Total # of Interventions Accepted:   Time Spent (min): 15

## 2025-08-14 ENCOUNTER — TELEPHONE (OUTPATIENT)
Dept: PHARMACY | Age: 40
End: 2025-08-14

## 2025-08-21 ENCOUNTER — TELEPHONE (OUTPATIENT)
Dept: PHARMACY | Age: 40
End: 2025-08-21

## 2025-08-25 ENCOUNTER — ANTI-COAG VISIT (OUTPATIENT)
Dept: PHARMACY | Age: 40
End: 2025-08-25
Payer: COMMERCIAL

## 2025-08-25 DIAGNOSIS — I63.429 CEREBROVASCULAR ACCIDENT (CVA) DUE TO EMBOLISM OF ANTERIOR CEREBRAL ARTERY, UNSPECIFIED BLOOD VESSEL LATERALITY (HCC): Primary | ICD-10-CM

## 2025-08-25 DIAGNOSIS — G45.9 TIA (TRANSIENT ISCHEMIC ATTACK): ICD-10-CM

## 2025-08-25 LAB
INTERNATIONAL NORMALIZATION RATIO, POC: 2.3
POC INR: 2.3 (ref 0.9–1.2)
PROTHROMBIN TIME, POC: NORMAL

## 2025-08-25 PROCEDURE — 99212 OFFICE O/P EST SF 10 MIN: CPT

## 2025-08-25 PROCEDURE — 85610 PROTHROMBIN TIME: CPT

## 2025-08-25 RX ORDER — WARFARIN SODIUM 5 MG/1
TABLET ORAL
Qty: 98 TABLET | Refills: 0 | Status: SHIPPED | OUTPATIENT
Start: 2025-08-25

## 2025-08-28 RX ORDER — LISINOPRIL 20 MG/1
20 TABLET ORAL DAILY
Qty: 90 TABLET | Refills: 1 | Status: SHIPPED | OUTPATIENT
Start: 2025-08-28